# Patient Record
Sex: FEMALE | Race: WHITE | Employment: OTHER | URBAN - METROPOLITAN AREA
[De-identification: names, ages, dates, MRNs, and addresses within clinical notes are randomized per-mention and may not be internally consistent; named-entity substitution may affect disease eponyms.]

---

## 2017-02-08 PROBLEM — M79.671 PAIN IN BOTH FEET: Status: ACTIVE | Noted: 2017-02-08

## 2017-02-08 PROBLEM — Z23 ENCOUNTER FOR IMMUNIZATION: Status: ACTIVE | Noted: 2017-02-08

## 2017-02-08 PROBLEM — M79.672 PAIN IN BOTH FEET: Status: ACTIVE | Noted: 2017-02-08

## 2017-11-08 ENCOUNTER — HOSPITAL ENCOUNTER (OUTPATIENT)
Dept: MAMMOGRAPHY | Age: 77
Discharge: HOME | End: 2017-11-08
Attending: INTERNAL MEDICINE
Payer: MEDICARE

## 2017-11-08 DIAGNOSIS — Z12.31 VISIT FOR SCREENING MAMMOGRAM: ICD-10-CM

## 2017-11-08 PROCEDURE — 77067 SCR MAMMO BI INCL CAD: CPT

## 2018-01-10 PROBLEM — R73.9 HYPERGLYCEMIA: Status: ACTIVE | Noted: 2018-01-10

## 2018-07-11 PROBLEM — R73.09 ABNORMAL GLUCOSE: Status: ACTIVE | Noted: 2018-01-10

## 2018-07-11 PROBLEM — Z23 ENCOUNTER FOR IMMUNIZATION: Status: RESOLVED | Noted: 2017-02-08 | Resolved: 2018-07-11

## 2018-08-01 PROBLEM — E11.9 TYPE 2 DIABETES MELLITUS WITHOUT COMPLICATION, WITHOUT LONG-TERM CURRENT USE OF INSULIN (HCC): Status: ACTIVE | Noted: 2018-08-01

## 2018-08-01 PROBLEM — H61.21 IMPACTED CERUMEN OF RIGHT EAR: Status: ACTIVE | Noted: 2018-08-01

## 2018-09-12 ENCOUNTER — HOSPITAL ENCOUNTER (OUTPATIENT)
Dept: MRI IMAGING | Age: 78
Discharge: HOME | End: 2018-09-12
Attending: OTOLARYNGOLOGY
Payer: MEDICARE

## 2018-09-12 LAB — CREAT BLD-MCNC: 1 MG/DL (ref 0.8–1.5)

## 2018-09-12 PROCEDURE — A9575 INJ GADOTERATE MEGLUMI 0.1ML: HCPCS | Performed by: OTOLARYNGOLOGY

## 2018-09-12 PROCEDURE — 82565 ASSAY OF CREATININE: CPT

## 2018-09-12 PROCEDURE — 74011250636 HC RX REV CODE- 250/636: Performed by: OTOLARYNGOLOGY

## 2018-09-12 PROCEDURE — 70553 MRI BRAIN STEM W/O & W/DYE: CPT

## 2018-09-12 RX ORDER — SODIUM CHLORIDE 0.9 % (FLUSH) 0.9 %
10 SYRINGE (ML) INJECTION
Status: COMPLETED | OUTPATIENT
Start: 2018-09-12 | End: 2018-09-12

## 2018-09-12 RX ORDER — GADOTERATE MEGLUMINE 376.9 MG/ML
15 INJECTION INTRAVENOUS
Status: COMPLETED | OUTPATIENT
Start: 2018-09-12 | End: 2018-09-12

## 2018-09-12 RX ADMIN — GADOTERATE MEGLUMINE 15 ML: 376.9 INJECTION INTRAVENOUS at 14:30

## 2018-09-12 RX ADMIN — Medication 10 ML: at 14:31

## 2018-11-12 ENCOUNTER — HOSPITAL ENCOUNTER (OUTPATIENT)
Dept: MAMMOGRAPHY | Age: 78
Discharge: HOME | End: 2018-11-12
Payer: MEDICARE

## 2018-11-12 DIAGNOSIS — M89.9 DISORDER OF BONE AND CARTILAGE: ICD-10-CM

## 2018-11-12 DIAGNOSIS — Z12.31 ENCOUNTER FOR SCREENING MAMMOGRAM FOR MALIGNANT NEOPLASM OF BREAST: ICD-10-CM

## 2018-11-12 DIAGNOSIS — M94.9 DISORDER OF BONE AND CARTILAGE: ICD-10-CM

## 2018-11-12 PROCEDURE — 77067 SCR MAMMO BI INCL CAD: CPT

## 2018-11-12 PROCEDURE — 77080 DXA BONE DENSITY AXIAL: CPT

## 2018-11-16 NOTE — PROGRESS NOTES
I can let her know that bone density was improved slightly in the spine but not elsewhere.  Continue her current therapy and can discuss with Dr Bc Jaquez at Follow up

## 2019-01-29 PROBLEM — E11.21 TYPE 2 DIABETES WITH NEPHROPATHY (HCC): Status: ACTIVE | Noted: 2019-01-29

## 2020-08-19 PROBLEM — R73.9 HYPERGLYCEMIA: Status: ACTIVE | Noted: 2020-08-19

## 2021-02-17 PROBLEM — E11.21 TYPE 2 DIABETES WITH NEPHROPATHY (HCC): Status: RESOLVED | Noted: 2019-01-29 | Resolved: 2021-02-17

## 2021-02-17 PROBLEM — M25.552 LEFT HIP PAIN: Status: ACTIVE | Noted: 2021-02-17

## 2021-03-20 ENCOUNTER — HOSPITAL ENCOUNTER (OUTPATIENT)
Dept: MAMMOGRAPHY | Age: 81
Discharge: HOME | End: 2021-03-20
Attending: INTERNAL MEDICINE
Payer: MEDICARE

## 2021-03-20 DIAGNOSIS — M81.0 OSTEOPOROSIS, UNSPECIFIED OSTEOPOROSIS TYPE, UNSPECIFIED PATHOLOGICAL FRACTURE PRESENCE: ICD-10-CM

## 2021-03-20 PROCEDURE — 77080 DXA BONE DENSITY AXIAL: CPT

## 2021-03-21 NOTE — PROGRESS NOTES
Call patient and let her know that test is , try Prolia for persistent osteoporosis?   Works differently than drugs she took before, q 6 mon injection

## 2021-05-19 ENCOUNTER — HOSPITAL ENCOUNTER (OUTPATIENT)
Dept: INFUSION THERAPY | Age: 81
Discharge: HOME | End: 2021-05-19
Payer: MEDICARE

## 2021-05-19 ENCOUNTER — HOSPITAL ENCOUNTER (OUTPATIENT)
Dept: LAB | Age: 81
Discharge: HOME | End: 2021-05-19

## 2021-05-19 VITALS
OXYGEN SATURATION: 94 % | RESPIRATION RATE: 16 BRPM | TEMPERATURE: 97.7 F | HEART RATE: 98 BPM | DIASTOLIC BLOOD PRESSURE: 99 MMHG | SYSTOLIC BLOOD PRESSURE: 126 MMHG

## 2021-05-19 LAB — CALCIUM SERPL-MCNC: 9.9 MG/DL (ref 8.3–10.4)

## 2021-05-19 PROCEDURE — 82310 ASSAY OF CALCIUM: CPT

## 2021-05-19 PROCEDURE — 74011250636 HC RX REV CODE- 250/636: Performed by: INTERNAL MEDICINE

## 2021-05-19 PROCEDURE — 96372 THER/PROPH/DIAG INJ SC/IM: CPT

## 2021-05-19 PROCEDURE — 36415 COLL VENOUS BLD VENIPUNCTURE: CPT

## 2021-05-19 RX ADMIN — DENOSUMAB 60 MG: 60 INJECTION SUBCUTANEOUS at 10:57

## 2021-05-19 NOTE — PROGRESS NOTES
Arrived to the [facility]. Prolia completed. Provided education on side effects to report; informations sheet provided. Patient instructed to report any side affects to ordering provider. Patient tolerated well. Any issues or concerns during appointment: none. No future appt at this time. Discharged ambulatory.

## 2021-08-18 PROBLEM — E11.51 TYPE 2 DIABETES MELLITUS WITH PERIPHERAL VASCULAR DISEASE (HCC): Status: ACTIVE | Noted: 2018-08-01

## 2021-08-25 PROBLEM — F11.99 OPIOID USE, UNSPECIFIED WITH UNSPECIFIED OPIOID-INDUCED DISORDER (HCC): Status: ACTIVE | Noted: 2021-08-25

## 2021-09-04 ENCOUNTER — HOSPITAL ENCOUNTER (OUTPATIENT)
Dept: MAMMOGRAPHY | Age: 81
Discharge: HOME | End: 2021-09-04
Attending: NURSE PRACTITIONER
Payer: MEDICARE

## 2021-09-04 DIAGNOSIS — Z12.31 ENCOUNTER FOR SCREENING MAMMOGRAM FOR MALIGNANT NEOPLASM OF BREAST: ICD-10-CM

## 2021-09-04 PROCEDURE — 77067 SCR MAMMO BI INCL CAD: CPT

## 2022-03-14 PROBLEM — Z78.9 STATIN INTOLERANCE: Status: ACTIVE | Noted: 2022-03-14

## 2022-03-14 PROBLEM — R42 DIZZINESS: Status: ACTIVE | Noted: 2022-03-14

## 2022-03-18 PROBLEM — Z78.9 STATIN INTOLERANCE: Status: ACTIVE | Noted: 2022-03-14

## 2022-03-18 PROBLEM — M79.672 PAIN IN BOTH FEET: Status: ACTIVE | Noted: 2017-02-08

## 2022-03-18 PROBLEM — M79.671 PAIN IN BOTH FEET: Status: ACTIVE | Noted: 2017-02-08

## 2022-03-19 PROBLEM — R42 DIZZINESS: Status: ACTIVE | Noted: 2022-03-14

## 2022-03-19 PROBLEM — F11.99 OPIOID USE, UNSPECIFIED WITH UNSPECIFIED OPIOID-INDUCED DISORDER (HCC): Status: ACTIVE | Noted: 2021-08-25

## 2022-03-19 PROBLEM — E11.51 TYPE 2 DIABETES MELLITUS WITH PERIPHERAL VASCULAR DISEASE (HCC): Status: ACTIVE | Noted: 2018-08-01

## 2022-03-20 PROBLEM — M25.552 LEFT HIP PAIN: Status: ACTIVE | Noted: 2021-02-17

## 2022-04-22 DIAGNOSIS — E03.9 ACQUIRED HYPOTHYROIDISM: ICD-10-CM

## 2022-04-22 DIAGNOSIS — E11.51 TYPE 2 DIABETES MELLITUS WITH PERIPHERAL VASCULAR DISEASE (HCC): ICD-10-CM

## 2022-04-22 DIAGNOSIS — I10 ESSENTIAL HYPERTENSION: Primary | ICD-10-CM

## 2022-04-22 DIAGNOSIS — E78.5 HYPERLIPIDEMIA, UNSPECIFIED HYPERLIPIDEMIA TYPE: ICD-10-CM

## 2022-06-01 DIAGNOSIS — E11.51 TYPE 2 DIABETES MELLITUS WITH PERIPHERAL VASCULAR DISEASE (HCC): ICD-10-CM

## 2022-06-01 DIAGNOSIS — E03.9 ACQUIRED HYPOTHYROIDISM: ICD-10-CM

## 2022-06-01 DIAGNOSIS — I10 ESSENTIAL HYPERTENSION: ICD-10-CM

## 2022-06-01 DIAGNOSIS — E78.5 HYPERLIPIDEMIA, UNSPECIFIED HYPERLIPIDEMIA TYPE: ICD-10-CM

## 2022-06-01 LAB
ALT SERPL-CCNC: 30 U/L (ref 12–65)
ANION GAP SERPL CALC-SCNC: 6 MMOL/L (ref 7–16)
BUN SERPL-MCNC: 21 MG/DL (ref 8–23)
CALCIUM SERPL-MCNC: 9.6 MG/DL (ref 8.3–10.4)
CHLORIDE SERPL-SCNC: 104 MMOL/L (ref 98–107)
CHOLEST SERPL-MCNC: 143 MG/DL
CO2 SERPL-SCNC: 31 MMOL/L (ref 21–32)
CREAT SERPL-MCNC: 1 MG/DL (ref 0.6–1)
EST. AVERAGE GLUCOSE BLD GHB EST-MCNC: 126 MG/DL
GLUCOSE SERPL-MCNC: 96 MG/DL (ref 65–100)
HBA1C MFR BLD: 6 % (ref 4.2–6.3)
HDLC SERPL-MCNC: 50 MG/DL (ref 40–60)
HDLC SERPL: 2.9 {RATIO}
LDLC SERPL CALC-MCNC: 69.6 MG/DL
POTASSIUM SERPL-SCNC: 4.7 MMOL/L (ref 3.5–5.1)
SODIUM SERPL-SCNC: 141 MMOL/L (ref 136–145)
TRIGL SERPL-MCNC: 117 MG/DL (ref 35–150)
TSH, 3RD GENERATION: 0.55 UIU/ML (ref 0.36–3.74)
VLDLC SERPL CALC-MCNC: 23.4 MG/DL (ref 6–23)

## 2022-06-22 ENCOUNTER — OFFICE VISIT (OUTPATIENT)
Dept: VASCULAR SURGERY | Age: 82
End: 2022-06-22
Payer: MEDICARE

## 2022-06-22 VITALS
WEIGHT: 154.3 LBS | HEART RATE: 88 BPM | OXYGEN SATURATION: 92 % | BODY MASS INDEX: 29.13 KG/M2 | DIASTOLIC BLOOD PRESSURE: 82 MMHG | SYSTOLIC BLOOD PRESSURE: 152 MMHG | HEIGHT: 61 IN | TEMPERATURE: 97.2 F

## 2022-06-22 DIAGNOSIS — Z98.890 H/O CAROTID ENDARTERECTOMY: ICD-10-CM

## 2022-06-22 DIAGNOSIS — I65.22 LEFT CAROTID STENOSIS: Primary | ICD-10-CM

## 2022-06-22 PROCEDURE — G8417 CALC BMI ABV UP PARAM F/U: HCPCS | Performed by: NURSE PRACTITIONER

## 2022-06-22 PROCEDURE — 99213 OFFICE O/P EST LOW 20 MIN: CPT | Performed by: NURSE PRACTITIONER

## 2022-06-22 PROCEDURE — 1090F PRES/ABSN URINE INCON ASSESS: CPT | Performed by: NURSE PRACTITIONER

## 2022-06-22 PROCEDURE — G8399 PT W/DXA RESULTS DOCUMENT: HCPCS | Performed by: NURSE PRACTITIONER

## 2022-06-22 PROCEDURE — 1123F ACP DISCUSS/DSCN MKR DOCD: CPT | Performed by: NURSE PRACTITIONER

## 2022-06-22 PROCEDURE — 1036F TOBACCO NON-USER: CPT | Performed by: NURSE PRACTITIONER

## 2022-06-22 PROCEDURE — G8427 DOCREV CUR MEDS BY ELIG CLIN: HCPCS | Performed by: NURSE PRACTITIONER

## 2022-06-22 NOTE — PATIENT INSTRUCTIONS
Patient Education        Carotid Stenosis: Care Instructions  Overview     Carotid stenosis is narrowing of one or both of the carotid arteries. These arteries take blood from the heart to the brain. There is one on each side ofthe neck. Carotid artery stenosis is caused by a process called hardening of the arteries, or atherosclerosis. A substance called plaque builds up inside the carotid arteries. Things that can lead to plaque include diabetes, high blood pressure, high cholesterol, and smoking. This plaque may limit blood flow to your brain. If plaque breaks open, it may form a blood clot. Or pieces of the plaque may break off. A piece of plaque or a blood clot could move to thebrain, causing a stroke or transient ischemic attack (TIA). The goal of treatment is to lower your risk of having a stroke or TIA. You can lower your risk by having a heart-healthy lifestyle and taking medicine. Sometimes a surgery or procedure is also done. Follow-up care is a key part of your treatment and safety. Be sure to make and go to all appointments, and call your doctor if you are having problems. It's also a good idea to know your test results and keep alist of the medicines you take. How can you care for yourself at home?  Take your medicines exactly as prescribed. Call your doctor if you think you are having a problem with your medicine. You may take medicine to lower your blood pressure, to lower your cholesterol, or to prevent blood clots.  If you take a blood thinner, such as aspirin, be sure to get instructions about how to take your medicine safely. Blood thinners can cause serious bleeding problems.  Do not smoke. People who smoke have a higher chance of stroke than those who quit. If you need help quitting, talk to your doctor about stop-smoking programs and medicines. These can increase your chances of quitting for good.  Eat heart-healthy foods.  These foods include vegetables, fruits, nuts, beans, lean meat, fish, and whole grains. You limit things that are not so good for your heart, like sodium, alcohol, and sugar.  Stay at a healthy weight. Lose weight if you need to.  Be active. Ask your doctor what type and level of exercise is safe for you.  Limit alcohol to 2 drinks a day for men and 1 drink a day for women. Too much alcohol can cause health problems.  Manage other health problems such as diabetes, high blood pressure, and high cholesterol. If you think you may have a problem with alcohol or drug use, talk to your doctor.  Avoid colds and flu. Get the flu vaccine every year. When should you call for help? Call 911 anytime you think you may need emergency care. For example, call if:     You passed out (lost consciousness).      You have symptoms of a stroke. These may include:  ? Sudden numbness, tingling, weakness, or loss of movement in your face, arm, or leg, especially on only one side of your body. ? Sudden vision changes. ? Sudden trouble speaking. ? Sudden confusion or trouble understanding simple statements. ? Sudden problems with walking or balance. ? A sudden, severe headache that is different from past headaches. Call your doctor now or seek immediate medical care if:     You are dizzy or lightheaded, or you feel like you may faint. Watch closely for changes in your health, and be sure to contact your doctor ifyou have any problems. Where can you learn more? Go to [email or web address] and sign in to your [facility] account. Enter S546 in the [facility] box to learn more about \"Carotid Stenosis: Care Instructions. \"     If you do not have an account, please click on the \"Sign Up Now\" link. Current as of: January 10, 2022               Content Version: 13.3  © 7795-3035 [employer]wise, [employer]. Care instructions adapted under license by [facility] ([facility]).  If you have questions about a medical condition or this instruction, always ask your healthcare professional. [address] any warranty or liability for your use of this information.

## 2022-06-22 NOTE — PROGRESS NOTES
DATE OF VISIT: 6/22/2022      [facility]  Orin Nolasco is a 80 y.o. female who follows up for her 6-month carotid duplex study. She denies any new lateralizing neurological symptoms. She remains on aspirin and atorvastatin. MEDICAL HISTORY:   Past Medical History:   Diagnosis Date    Carotid artery stenosis 10/1/2014    Depression 1/4/2016    Hyperlipidemia     Insomnia 1/4/2016    Irritable bowel syndrome 1/4/2016    Osteoarthritis 1/4/2016    Osteoporosis 1/4/2016    Peripheral artery insufficiency ([address] 1/4/2016    Sciatica 1/4/2016    TIA (transient ischemic attack) 9/11/2016         SURGICAL HISTORY:   Past Surgical History:   Procedure Laterality Date    APPENDECTOMY      BREAST SURGERY Left     cyst removed    CHOLECYSTECTOMY      COLONOSCOPY      CYST INCISION AND DRAINAGE Left 1980's    HIP FRACTURE SURGERY Right 3/2014    HYSTERECTOMY (CERVIX STATUS UNKNOWN)      ORTHOPEDIC SURGERY Right 2014    partial hip replacement    TONSILLECTOMY         Review of Systems:  Constitutional:   Negative for fevers and unexplained weight loss. Respiratory:   Negative for hemoptysis. Cardiovascular:   Negative except as noted in HPI. Musculoskeletal:  Negative for active, unexplained/severe joint pain.       ALLERGIES:   Allergies   Allergen Reactions    Lactose Other (See Comments)    Statins Myalgia     Only allergic to pravastatin       CURRENT MEDICATIONS:  Current Outpatient Medications   Medication Sig Dispense Refill    Multiple Vitamin (MULTI-VITAMINS PO) Take 1 tablet by mouth      aspirin 81 MG EC tablet Take 81 mg by mouth 2 times daily      atorvastatin (LIPITOR) 10 MG tablet Take 10 mg by mouth daily      Bempedoic Acid (NEXLETOL) 180 MG TABS Take 1 tablet by mouth daily      Calcium Carbonate-Vitamin D (CALCIUM-VITAMIN D) 600-125 MG-UNIT TABS Take 1 tablet by mouth Daily with lunch      coenzyme Q10 100 MG CAPS capsule Take 100 mg by mouth Daily with lunch      cyclobenzaprine (FLEXERIL) 10 MG tablet TK 1 T PO QHS FOR MUSCLE SPASM      denosumab (PROLIA) 60 MG/ML SOSY SC injection Inject 60 mg into the skin      levothyroxine (SYNTHROID) 88 MCG tablet Take 1/2 tab on Sunday and one all other days Indications: a condition with low thyroid hormone levels      lisinopril (PRINIVIL;ZESTRIL) 20 MG tablet Take 20 mg by mouth 2 times daily      meclizine (ANTIVERT) 25 MG tablet Take 25 mg by mouth 3 times daily as needed      polyethylene glycol (GLYCOLAX) 17 GM/SCOOP powder Take 17 g by mouth      sertraline (ZOLOFT) 50 MG tablet Take 50 mg by mouth daily       No current facility-administered medications for this visit. IMAGING: Interpretation Summary         Right Carotid: Peak systolic velocities and atherosclerotic plaque indicate a <50% stenosis in the ICA.   Left Carotid: Peak systolic velocities and atherosclerotic plaque indicate a 70-99% stenosis in the ICA.   The vertebral arteries are patent with antegrade flow bilaterally.   The proximal right CCA was used for the ICA/CCA ratio due to the mid CCA stenosis.       Procedure Details    A gray scale, color, Doppler analysis and B-mode ultrasound was performed. During the study longitudinal and transverse views were obtained. Pulsed wave doppler was performed. Overall the study quality was good. Cerebrovascular Findings      Right Carotid    There is moderate stenosis in the right CCA. The right CCA has moderate and calcific plaque. The right distal CCA has turbulent flow. There is mild stenosis in the right ICA (<50%). The right vertebral is antegrade. Left Carotid    The left CCA has mild and calcific plaque. There is severe stenosis in the left ICA (>70%) and at the proximal segment . The left ICA has heterogeneous plaque. The left vertebral is antegrade.        Carotid Measurements     Right PSV Right EDV Left PSV Left EDV   CCA Prox 96.5 cm/s       15.6 cm/s       102 cm/s       30 cm/s CCA Mid 153 cm/s       36 cm/s                 CCA Dist 162 cm/s       37.7 cm/s       96.9 cm/s       22.3 cm/s         ICA Prox 126 cm/s       23.2 cm/s       438 cm/s       152 cm/s         ICA Mid 132 cm/s       43.7 cm/s       153 cm/s       30.7 cm/s         ICA Dist 140 cm/s       41.2 cm/s       141 cm/s       41.3 cm/s         ICA/CCA Ratio 1.45         4.29            cm/s       11.6 cm/s       127 cm/s       11.1 cm/s         Vertebral 122 cm/s       30.9 cm/s       105 cm/s       29.9 cm/s                                     Procedure Staff    Technologist/Clinician: Alexey Nj  Supporting Staff: None  Performing Physician/Midlevel: None     Exam Completion Date/Time: 6/22/22  8:56 AM            PHYSICAL EXAM  VITALS: BP (!) 152/82 (Site: Right Upper Arm, Position: Sitting, Cuff Size: Small Adult)   Pulse 88   Temp 97.2 °F (36.2 °C) (Temporal)   Ht 5' 1\" (1.549 m)   Wt 154 lb 4.8 oz (70 kg)   SpO2 92%   BMI 29.15 kg/m²       GENERAL: Well developed, well nourished, in NAD  HEAD/NECK: normocephalic, atraumatic, neck supple  MUSCULOSKELETAL: normal gait  NEURO: sensation and strength grossly intact and symmetrical  PSYCH: alert and oriented to person, place and time      Assessment/Plan: Patient is an 79-year-old female who follows up for 6-month carotid duplex study.  No new lateralizing neurological symptoms.  She is to continue her aspirin and cholesterol-lowering medication. Stan Morning is to present to the emergency  department with any S/S of stroke i.e. slurred speech, facial drooping, amaurosis fugax or unilateral weakness.  Her left carotid lesion is high at C1.  Should she become symptomatic she would potentially require a carotid stent.  Follow-up in 4 months  sooner should any issues arise. Ultrasound duplex reviewed with Dr. Kate Frank who is in agreement with the treatment plan.      Alivia Thomas, APRN - CNP    20 minutes of time was spent on this encounter including chart review, assessment and evaluation

## 2022-07-05 ENCOUNTER — PROCEDURE VISIT (OUTPATIENT)
Dept: ENT CLINIC | Age: 82
End: 2022-07-05

## 2022-07-05 DIAGNOSIS — Z97.4 USES HEARING AID: Primary | ICD-10-CM

## 2022-07-05 PROCEDURE — V5266 BATTERY FOR HEARING DEVICE: HCPCS | Performed by: AUDIOLOGIST

## 2022-07-11 ENCOUNTER — OFFICE VISIT (OUTPATIENT)
Dept: INTERNAL MEDICINE CLINIC | Facility: CLINIC | Age: 82
End: 2022-07-11
Payer: MEDICARE

## 2022-07-11 VITALS
HEIGHT: 61 IN | SYSTOLIC BLOOD PRESSURE: 136 MMHG | WEIGHT: 154 LBS | BODY MASS INDEX: 29.07 KG/M2 | DIASTOLIC BLOOD PRESSURE: 84 MMHG

## 2022-07-11 DIAGNOSIS — I65.22 LEFT CAROTID STENOSIS: ICD-10-CM

## 2022-07-11 DIAGNOSIS — I10 ESSENTIAL HYPERTENSION: ICD-10-CM

## 2022-07-11 DIAGNOSIS — E78.5 HYPERLIPIDEMIA, UNSPECIFIED HYPERLIPIDEMIA TYPE: ICD-10-CM

## 2022-07-11 DIAGNOSIS — E11.51 TYPE 2 DIABETES MELLITUS WITH PERIPHERAL VASCULAR DISEASE (HCC): ICD-10-CM

## 2022-07-11 DIAGNOSIS — F11.99 OPIOID USE, UNSPECIFIED WITH UNSPECIFIED OPIOID-INDUCED DISORDER (HCC): ICD-10-CM

## 2022-07-11 DIAGNOSIS — F32.A DEPRESSION, UNSPECIFIED DEPRESSION TYPE: ICD-10-CM

## 2022-07-11 DIAGNOSIS — E03.9 ACQUIRED HYPOTHYROIDISM: Primary | ICD-10-CM

## 2022-07-11 DIAGNOSIS — M19.91 PRIMARY OSTEOARTHRITIS, UNSPECIFIED SITE: ICD-10-CM

## 2022-07-11 PROCEDURE — 1036F TOBACCO NON-USER: CPT | Performed by: INTERNAL MEDICINE

## 2022-07-11 PROCEDURE — G8427 DOCREV CUR MEDS BY ELIG CLIN: HCPCS | Performed by: INTERNAL MEDICINE

## 2022-07-11 PROCEDURE — G8417 CALC BMI ABV UP PARAM F/U: HCPCS | Performed by: INTERNAL MEDICINE

## 2022-07-11 PROCEDURE — G8399 PT W/DXA RESULTS DOCUMENT: HCPCS | Performed by: INTERNAL MEDICINE

## 2022-07-11 PROCEDURE — 1090F PRES/ABSN URINE INCON ASSESS: CPT | Performed by: INTERNAL MEDICINE

## 2022-07-11 PROCEDURE — 99214 OFFICE O/P EST MOD 30 MIN: CPT | Performed by: INTERNAL MEDICINE

## 2022-07-11 PROCEDURE — 1123F ACP DISCUSS/DSCN MKR DOCD: CPT | Performed by: INTERNAL MEDICINE

## 2022-07-11 PROCEDURE — 3044F HG A1C LEVEL LT 7.0%: CPT | Performed by: INTERNAL MEDICINE

## 2022-07-11 RX ORDER — LEVOTHYROXINE SODIUM 88 UG/1
TABLET ORAL
Qty: 90 TABLET | Refills: 3 | Status: SHIPPED | OUTPATIENT
Start: 2022-07-11

## 2022-07-11 RX ORDER — TRAMADOL HYDROCHLORIDE 50 MG/1
50 TABLET ORAL 2 TIMES DAILY PRN
Qty: 60 TABLET | Refills: 5 | Status: SHIPPED | OUTPATIENT
Start: 2022-07-11 | End: 2022-08-10

## 2022-07-11 RX ORDER — TRAMADOL HYDROCHLORIDE 50 MG/1
50 TABLET ORAL 2 TIMES DAILY PRN
COMMUNITY
End: 2022-07-11 | Stop reason: SDUPTHER

## 2022-07-11 RX ORDER — ATORVASTATIN CALCIUM 10 MG/1
10 TABLET, FILM COATED ORAL DAILY
Qty: 90 TABLET | Refills: 3 | Status: SHIPPED | OUTPATIENT
Start: 2022-07-11

## 2022-07-11 RX ORDER — LISINOPRIL 20 MG/1
20 TABLET ORAL 2 TIMES DAILY
Qty: 180 TABLET | Refills: 3 | Status: SHIPPED | OUTPATIENT
Start: 2022-07-11

## 2022-07-11 ASSESSMENT — PATIENT HEALTH QUESTIONNAIRE - PHQ9
8. MOVING OR SPEAKING SO SLOWLY THAT OTHER PEOPLE COULD HAVE NOTICED. OR THE OPPOSITE, BEING SO FIGETY OR RESTLESS THAT YOU HAVE BEEN MOVING AROUND A LOT MORE THAN USUAL: 0
2. FEELING DOWN, DEPRESSED OR HOPELESS: 0
1. LITTLE INTEREST OR PLEASURE IN DOING THINGS: 0
3. TROUBLE FALLING OR STAYING ASLEEP: 0
SUM OF ALL RESPONSES TO PHQ QUESTIONS 1-9: 0
10. IF YOU CHECKED OFF ANY PROBLEMS, HOW DIFFICULT HAVE THESE PROBLEMS MADE IT FOR YOU TO DO YOUR WORK, TAKE CARE OF THINGS AT HOME, OR GET ALONG WITH OTHER PEOPLE: 0
7. TROUBLE CONCENTRATING ON THINGS, SUCH AS READING THE NEWSPAPER OR WATCHING TELEVISION: 0
9. THOUGHTS THAT YOU WOULD BE BETTER OFF DEAD, OR OF HURTING YOURSELF: 0
SUM OF ALL RESPONSES TO PHQ9 QUESTIONS 1 & 2: 0
SUM OF ALL RESPONSES TO PHQ QUESTIONS 1-9: 0
4. FEELING TIRED OR HAVING LITTLE ENERGY: 0
6. FEELING BAD ABOUT YOURSELF - OR THAT YOU ARE A FAILURE OR HAVE LET YOURSELF OR YOUR FAMILY DOWN: 0
5. POOR APPETITE OR OVEREATING: 0
SUM OF ALL RESPONSES TO PHQ QUESTIONS 1-9: 0
SUM OF ALL RESPONSES TO PHQ QUESTIONS 1-9: 0

## 2022-07-11 ASSESSMENT — ENCOUNTER SYMPTOMS: SHORTNESS OF BREATH: 0

## 2022-07-11 NOTE — PROGRESS NOTES
She is seeing vascular surgery every 4 to 6 months, Dr Sudha Crowe. Doing OK on the atorvastatin. Carotid blockage is fairly high in the neck,  She has vertigo every few months and just cannot walk. She lays down and waits for it to resolve. Past Medical History, Past Surgical History, Family history, Social History, and Medications were all reviewed with the patient today and updated as necessary. Current Outpatient Medications   Medication Sig Dispense Refill    traMADol (ULTRAM) 50 MG tablet Take 50 mg by mouth 2 times daily as needed for Pain.  Multiple Vitamin (MULTI-VITAMINS PO) Take 1 tablet by mouth      aspirin 81 MG EC tablet Take 81 mg by mouth 2 times daily      atorvastatin (LIPITOR) 10 MG tablet Take 10 mg by mouth daily      Calcium Carbonate-Vitamin D (CALCIUM-VITAMIN D) 600-125 MG-UNIT TABS Take 1 tablet by mouth Daily with lunch      coenzyme Q10 100 MG CAPS capsule Take 100 mg by mouth Daily with lunch      denosumab (PROLIA) 60 MG/ML SOSY SC injection Inject 60 mg into the skin      levothyroxine (SYNTHROID) 88 MCG tablet Take 1/2 tab on Sunday and one all other days Indications: a condition with low thyroid hormone levels      lisinopril (PRINIVIL;ZESTRIL) 20 MG tablet Take 20 mg by mouth 2 times daily      meclizine (ANTIVERT) 25 MG tablet Take 25 mg by mouth 3 times daily as needed      polyethylene glycol (GLYCOLAX) 17 GM/SCOOP powder Take 17 g by mouth      sertraline (ZOLOFT) 50 MG tablet Take 50 mg by mouth daily      cyclobenzaprine (FLEXERIL) 10 MG tablet TK 1 T PO QHS FOR MUSCLE SPASM (Patient not taking: Reported on 7/11/2022)       No current facility-administered medications for this visit.      Allergies   Allergen Reactions    Lactose Other (See Comments)     Abdominal pain, irritable bowel    Nexletol [Bempedoic Acid] Diarrhea    Statins Myalgia     Only allergic to pravastatin     Patient Active Problem List   Diagnosis    Osteoarthritis    Irritable bowel syndrome    Statin intolerance    Pain in both feet    Opioid use, unspecified with unspecified opioid-induced disorder (HCC)    Dizziness    TIA (transient ischemic attack)    Essential hypertension    Depression    Type 2 diabetes mellitus with peripheral vascular disease ([address]    Left carotid stenosis    Osteoporosis    Insomnia    Hypothyroidism    Sciatica    Left hip pain    Hyperlipidemia    H/O carotid endarterectomy         Review of Systems   Respiratory: Negative for shortness of breath. Musculoskeletal: Negative for myalgias. Neurological: Positive for dizziness. OBJECTIVE:  /84   Ht 5' 1\" (1.549 m)   Wt 154 lb (69.9 kg)   BMI 29.10 kg/m²      Physical Exam  Constitutional:       Appearance: Normal appearance. HENT:      Head: Normocephalic. Neurological:      Mental Status: She is alert. Psychiatric:         Mood and Affect: Mood normal.         Behavior: Behavior normal.          Medical problems and test results were reviewed with the patient today.      Recent Results (from the past 672 hour(s))   Vascular duplex carotid bilateral    Collection Time: 06/22/22  8:50 AM   Result Value Ref Range    Left CCA dist PSV 96.9 cm/s    Left CCA dist EDV 22.3 cm/s    Left CCA prox .0 cm/s    Left CCA prox EDV 30.0 cm/s    Left ICA dist .0 cm/s    Left ICA dist EDV 41.3 cm/s    Left ICA mid .0 cm/s    Left ICA mid EDV 30.7 cm/s    Left ICA prox .0 cm/s    Left ICA prox .0 cm/s    Left ECA .0 cm/s    Left ECA EDV 11.10 cm/s    Left vertebral .0 cm/s    Left vertebral EDV 29.90 cm/s    Right cca dist .0 cm/s    Right CCA dist EDV 37.7 cm/s    Right CCA mid .00 cm/s    Right CCA mid EDV 36.00 cm/s    Right CCA prox PSV 96.5 cm/s    Right CCA prox EDV 15.6 cm/s    Right ICA dist .0 cm/s    Right ICA dist EDV 41.2 cm/s    Right ICA mid .0 cm/s    Right ICA mid EDV 43.7 cm/s    Right ICA prox .0 cm/s    Right ICA prox EDV 23.2 cm/s    Right ECA .0 cm/s    Right ECA EDV 11.60 cm/s    Right vertebral .0 cm/s    Right vertebral EDV 30.90 cm/s    Right ICA/CCA PSV 1.45     Left ICA/CCA PSV 4.29          ASSESSMENT and PLAN    1. Acquired hypothyroidism  The following orders have not been finalized:  -     levothyroxine (SYNTHROID) 88 MCG tablet  2. Opioid use, unspecified with unspecified opioid-induced disorder ([facility]  3. Type 2 diabetes mellitus with peripheral vascular disease ([facility]  4. Hyperlipidemia, unspecified hyperlipidemia type  The following orders have not been finalized:  -     atorvastatin (LIPITOR) 10 MG tablet  5. Essential hypertension  The following orders have not been finalized:  -     lisinopril (PRINIVIL;ZESTRIL) 20 MG tablet  6. Left carotid stenosis  7. Primary osteoarthritis, unspecified site  The following orders have not been finalized:  -     traMADol (ULTRAM) 50 MG tablet  8.  Depression, unspecified depression type  The following orders have not been finalized:  -     sertraline (ZOLOFT) 50 MG tablet       doing well on statin Rx   Vascular following high carotid lesion, asymptomatic  Takes tramodol prn for severe hip pain after partial hip replacement

## 2022-10-25 ENCOUNTER — OFFICE VISIT (OUTPATIENT)
Dept: VASCULAR SURGERY | Age: 82
End: 2022-10-25
Payer: MEDICARE

## 2022-10-25 VITALS
SYSTOLIC BLOOD PRESSURE: 153 MMHG | HEIGHT: 61 IN | HEART RATE: 101 BPM | DIASTOLIC BLOOD PRESSURE: 83 MMHG | BODY MASS INDEX: 28.7 KG/M2 | WEIGHT: 152 LBS | OXYGEN SATURATION: 93 % | TEMPERATURE: 97 F

## 2022-10-25 DIAGNOSIS — I65.23 BILATERAL CAROTID ARTERY STENOSIS: Primary | ICD-10-CM

## 2022-10-25 PROCEDURE — G8417 CALC BMI ABV UP PARAM F/U: HCPCS | Performed by: SURGERY

## 2022-10-25 PROCEDURE — 1090F PRES/ABSN URINE INCON ASSESS: CPT | Performed by: SURGERY

## 2022-10-25 PROCEDURE — 99213 OFFICE O/P EST LOW 20 MIN: CPT | Performed by: SURGERY

## 2022-10-25 PROCEDURE — 1036F TOBACCO NON-USER: CPT | Performed by: SURGERY

## 2022-10-25 PROCEDURE — G8427 DOCREV CUR MEDS BY ELIG CLIN: HCPCS | Performed by: SURGERY

## 2022-10-25 PROCEDURE — G8484 FLU IMMUNIZE NO ADMIN: HCPCS | Performed by: SURGERY

## 2022-10-25 PROCEDURE — G8399 PT W/DXA RESULTS DOCUMENT: HCPCS | Performed by: SURGERY

## 2022-10-25 PROCEDURE — 1123F ACP DISCUSS/DSCN MKR DOCD: CPT | Performed by: SURGERY

## 2022-10-25 NOTE — PROGRESS NOTES
[address]   [address] [facility] FAX: [phone]iqui  : 1940    Chief Complaint:     History of Present Illness:   Patient follows up today for follows up for carotid surveillance. Patient has known left carotid stenosis greater than 70% however the lesion is very high at the base of C1. Patient denies any symptoms    CURRENT MEDICATIONS:  Current Outpatient Medications   Medication Sig Dispense Refill    sertraline (ZOLOFT) 50 MG tablet Take 1 tablet by mouth daily 90 tablet 3    lisinopril (PRINIVIL;ZESTRIL) 20 MG tablet Take 1 tablet by mouth 2 times daily 180 tablet 3    levothyroxine (SYNTHROID) 88 MCG tablet Take 1/2 tab on  and one all other days Indications: a condition with low thyroid hormone levels 90 tablet 3    atorvastatin (LIPITOR) 10 MG tablet Take 1 tablet by mouth daily 90 tablet 3    Multiple Vitamin (MULTI-VITAMINS PO) Take 1 tablet by mouth      aspirin 81 MG EC tablet Take 81 mg by mouth 2 times daily      Calcium Carbonate-Vitamin D (CALCIUM-VITAMIN D) 600-125 MG-UNIT TABS Take 1 tablet by mouth Daily with lunch      coenzyme Q10 100 MG CAPS capsule Take 100 mg by mouth Daily with lunch      denosumab (PROLIA) 60 MG/ML SOSY SC injection Inject 60 mg into the skin      meclizine (ANTIVERT) 25 MG tablet Take 25 mg by mouth 3 times daily as needed      polyethylene glycol (GLYCOLAX) 17 GM/SCOOP powder Take 17 g by mouth       No current facility-administered medications for this visit.        Past Medical History:   Diagnosis Date    Carotid artery stenosis 10/1/2014    Depression 2016    Hyperlipidemia     Insomnia 2016    Irritable bowel syndrome 2016    Osteoarthritis 2016    Osteoporosis 2016    Peripheral artery insufficiency ([address] 2016    Sciatica 2016    TIA (transient ischemic attack) 2016       Physical Examination:   Height: 5' 1\" (1.549 m), Weight: 152 lb (68.9 kg), BP: (!) 153/83    Constitutional: she appears well-developed. No distress. HENT:   Head: Atraumatic. Eyes: Pupils are equal, round, and reactive to light. Neck: Normal range of motion. Cardiovascular: Regular rhythm. Pulmonary/Chest: Effort normal and breath sounds normal. No respiratory distress. Abdominal: Soft. Bowel sounds are normal. she exhibits no distension. There is no tenderness. There is no guarding. No hernia. Musculoskeletal: Normal range of motion. Neurological: She is alert. CN II- XII grossly intact   Vascular: No carotid bruits    Imaging:  Right carotid velocities less than 50%  Left carotid velocities 342 cm/s end-diastolic 6/56/2806 indicative greater than 70%        Recommendations/Plans:   Ms. Berna Darling is a 80y.o. year old female with asymptomatic left carotid stenosis. Long discussion with patient in detail. Her lesion from last CT scan with high she continues to be asymptomatic and does not want any surgery. Discussed with patient if she does develop symptoms she may be of benefit for a transfer carotid stent. She will continue her dual antiplatelets and follow-up in 6 months. Theresa Alcazar MD    Elements of this note have been dictated using speech recognition software. As a result, errors of speech recognition may have occurred.     30 minutes of time was spent on this encounter including chart review, assessment, evaluation and coordination of patient care

## 2023-02-12 SDOH — ECONOMIC STABILITY: FOOD INSECURITY: WITHIN THE PAST 12 MONTHS, YOU WORRIED THAT YOUR FOOD WOULD RUN OUT BEFORE YOU GOT MONEY TO BUY MORE.: NEVER TRUE

## 2023-02-12 SDOH — ECONOMIC STABILITY: INCOME INSECURITY: HOW HARD IS IT FOR YOU TO PAY FOR THE VERY BASICS LIKE FOOD, HOUSING, MEDICAL CARE, AND HEATING?: NOT HARD AT ALL

## 2023-02-12 SDOH — ECONOMIC STABILITY: HOUSING INSECURITY
IN THE LAST 12 MONTHS, WAS THERE A TIME WHEN YOU DID NOT HAVE A STEADY PLACE TO SLEEP OR SLEPT IN A SHELTER (INCLUDING NOW)?: NO

## 2023-02-12 SDOH — ECONOMIC STABILITY: TRANSPORTATION INSECURITY
IN THE PAST 12 MONTHS, HAS LACK OF TRANSPORTATION KEPT YOU FROM MEETINGS, WORK, OR FROM GETTING THINGS NEEDED FOR DAILY LIVING?: NO

## 2023-02-12 SDOH — ECONOMIC STABILITY: FOOD INSECURITY: WITHIN THE PAST 12 MONTHS, THE FOOD YOU BOUGHT JUST DIDN'T LAST AND YOU DIDN'T HAVE MONEY TO GET MORE.: NEVER TRUE

## 2023-02-12 SDOH — HEALTH STABILITY: PHYSICAL HEALTH: ON AVERAGE, HOW MANY DAYS PER WEEK DO YOU ENGAGE IN MODERATE TO STRENUOUS EXERCISE (LIKE A BRISK WALK)?: 0 DAYS

## 2023-02-12 ASSESSMENT — PATIENT HEALTH QUESTIONNAIRE - PHQ9
2. FEELING DOWN, DEPRESSED OR HOPELESS: 0
SUM OF ALL RESPONSES TO PHQ9 QUESTIONS 1 & 2: 0
SUM OF ALL RESPONSES TO PHQ QUESTIONS 1-9: 0
1. LITTLE INTEREST OR PLEASURE IN DOING THINGS: 0

## 2023-02-12 ASSESSMENT — LIFESTYLE VARIABLES
HOW OFTEN DO YOU HAVE A DRINK CONTAINING ALCOHOL: MONTHLY OR LESS
HOW MANY STANDARD DRINKS CONTAINING ALCOHOL DO YOU HAVE ON A TYPICAL DAY: 1 OR 2
HOW OFTEN DO YOU HAVE SIX OR MORE DRINKS ON ONE OCCASION: 1
HOW OFTEN DO YOU HAVE A DRINK CONTAINING ALCOHOL: 2
HOW MANY STANDARD DRINKS CONTAINING ALCOHOL DO YOU HAVE ON A TYPICAL DAY: 1

## 2023-02-15 ENCOUNTER — OFFICE VISIT (OUTPATIENT)
Dept: INTERNAL MEDICINE CLINIC | Facility: CLINIC | Age: 83
End: 2023-02-15
Payer: MEDICARE

## 2023-02-15 VITALS
HEIGHT: 61 IN | WEIGHT: 152 LBS | SYSTOLIC BLOOD PRESSURE: 136 MMHG | BODY MASS INDEX: 28.7 KG/M2 | DIASTOLIC BLOOD PRESSURE: 84 MMHG

## 2023-02-15 DIAGNOSIS — I10 ESSENTIAL HYPERTENSION: ICD-10-CM

## 2023-02-15 DIAGNOSIS — M25.551 RIGHT HIP PAIN: ICD-10-CM

## 2023-02-15 DIAGNOSIS — Z00.00 MEDICARE ANNUAL WELLNESS VISIT, SUBSEQUENT: Primary | ICD-10-CM

## 2023-02-15 DIAGNOSIS — E11.51 TYPE 2 DIABETES MELLITUS WITH PERIPHERAL VASCULAR DISEASE (HCC): ICD-10-CM

## 2023-02-15 DIAGNOSIS — M81.0 AGE-RELATED OSTEOPOROSIS WITHOUT CURRENT PATHOLOGICAL FRACTURE: ICD-10-CM

## 2023-02-15 DIAGNOSIS — E03.9 HYPOTHYROIDISM, UNSPECIFIED TYPE: ICD-10-CM

## 2023-02-15 DIAGNOSIS — E78.5 HYPERLIPIDEMIA, UNSPECIFIED HYPERLIPIDEMIA TYPE: ICD-10-CM

## 2023-02-15 DIAGNOSIS — F11.99 OPIOID USE, UNSPECIFIED WITH UNSPECIFIED OPIOID-INDUCED DISORDER (HCC): ICD-10-CM

## 2023-02-15 LAB
ALBUMIN SERPL-MCNC: 3.9 G/DL (ref 3.2–4.6)
ALBUMIN/GLOB SERPL: 1 (ref 0.4–1.6)
ALP SERPL-CCNC: 102 U/L (ref 50–136)
ALT SERPL-CCNC: 23 U/L (ref 12–65)
ANION GAP SERPL CALC-SCNC: 10 MMOL/L (ref 2–11)
AST SERPL-CCNC: 23 U/L (ref 15–37)
BACTERIA URNS QL MICRO: NEGATIVE /HPF
BASOPHILS # BLD: 0.1 K/UL (ref 0–0.2)
BASOPHILS NFR BLD: 1 % (ref 0–2)
BILIRUB SERPL-MCNC: 0.6 MG/DL (ref 0.2–1.1)
BUN SERPL-MCNC: 18 MG/DL (ref 8–23)
CALCIUM SERPL-MCNC: 9.7 MG/DL (ref 8.3–10.4)
CASTS URNS QL MICRO: NORMAL /LPF (ref 0–2)
CHLORIDE SERPL-SCNC: 104 MMOL/L (ref 101–110)
CHOLEST SERPL-MCNC: 184 MG/DL
CO2 SERPL-SCNC: 26 MMOL/L (ref 21–32)
CREAT SERPL-MCNC: 1 MG/DL (ref 0.6–1)
CREAT UR-MCNC: 221 MG/DL
DIFFERENTIAL METHOD BLD: ABNORMAL
EOSINOPHIL # BLD: 0.3 K/UL (ref 0–0.8)
EOSINOPHIL NFR BLD: 3 % (ref 0.5–7.8)
EPI CELLS #/AREA URNS HPF: NORMAL /HPF (ref 0–5)
ERYTHROCYTE [DISTWIDTH] IN BLOOD BY AUTOMATED COUNT: 13.8 % (ref 11.9–14.6)
EST. AVERAGE GLUCOSE BLD GHB EST-MCNC: 126 MG/DL
GLOBULIN SER CALC-MCNC: 3.8 G/DL (ref 2.8–4.5)
GLUCOSE SERPL-MCNC: 127 MG/DL (ref 65–100)
HBA1C MFR BLD: 6 % (ref 4.8–5.6)
HCT VFR BLD AUTO: 47 % (ref 35.8–46.3)
HDLC SERPL-MCNC: 68 MG/DL (ref 40–60)
HDLC SERPL: 2.7
HGB BLD-MCNC: 15.4 G/DL (ref 11.7–15.4)
IMM GRANULOCYTES # BLD AUTO: 0 K/UL (ref 0–0.5)
IMM GRANULOCYTES NFR BLD AUTO: 0 % (ref 0–5)
LDLC SERPL CALC-MCNC: 97.8 MG/DL
LYMPHOCYTES # BLD: 2.3 K/UL (ref 0.5–4.6)
LYMPHOCYTES NFR BLD: 24 % (ref 13–44)
MCH RBC QN AUTO: 30 PG (ref 26.1–32.9)
MCHC RBC AUTO-ENTMCNC: 32.8 G/DL (ref 31.4–35)
MCV RBC AUTO: 91.4 FL (ref 82–102)
MICROALBUMIN UR-MCNC: 1.65 MG/DL (ref 0–3)
MICROALBUMIN/CREAT UR-RTO: 7 MG/G (ref 0–30)
MONOCYTES # BLD: 0.9 K/UL (ref 0.1–1.3)
MONOCYTES NFR BLD: 10 % (ref 4–12)
NEUTS SEG # BLD: 6.1 K/UL (ref 1.7–8.2)
NEUTS SEG NFR BLD: 62 % (ref 43–78)
NRBC # BLD: 0 K/UL (ref 0–0.2)
PLATELET # BLD AUTO: 314 K/UL (ref 150–450)
PMV BLD AUTO: 10.4 FL (ref 9.4–12.3)
POTASSIUM SERPL-SCNC: 4.1 MMOL/L (ref 3.5–5.1)
PROT SERPL-MCNC: 7.7 G/DL (ref 6.3–8.2)
RBC # BLD AUTO: 5.14 M/UL (ref 4.05–5.2)
RBC #/AREA URNS HPF: NORMAL /HPF (ref 0–5)
SODIUM SERPL-SCNC: 140 MMOL/L (ref 133–143)
TRIGL SERPL-MCNC: 91 MG/DL (ref 35–150)
TSH W FREE THYROID IF ABNORMAL: 0.8 UIU/ML (ref 0.36–3.74)
VLDLC SERPL CALC-MCNC: 18.2 MG/DL (ref 6–23)
WBC # BLD AUTO: 9.7 K/UL (ref 4.3–11.1)
WBC URNS QL MICRO: NORMAL /HPF (ref 0–4)

## 2023-02-15 PROCEDURE — G0439 PPPS, SUBSEQ VISIT: HCPCS | Performed by: NURSE PRACTITIONER

## 2023-02-15 PROCEDURE — 1123F ACP DISCUSS/DSCN MKR DOCD: CPT | Performed by: NURSE PRACTITIONER

## 2023-02-15 PROCEDURE — 3079F DIAST BP 80-89 MM HG: CPT | Performed by: NURSE PRACTITIONER

## 2023-02-15 PROCEDURE — G8484 FLU IMMUNIZE NO ADMIN: HCPCS | Performed by: NURSE PRACTITIONER

## 2023-02-15 PROCEDURE — 3075F SYST BP GE 130 - 139MM HG: CPT | Performed by: NURSE PRACTITIONER

## 2023-02-15 RX ORDER — TRAMADOL HYDROCHLORIDE 50 MG/1
50 TABLET ORAL 2 TIMES DAILY PRN
COMMUNITY
Start: 2022-12-29

## 2023-02-15 NOTE — PATIENT INSTRUCTIONS
Preventing Falls: Care Instructions  Overview     Getting around your home safely can be a challenge if you have injuries or health problems that make it easy for you to fall. Loose rugs and furniture in walkways are among the dangers for many older people who have problems walking or who have poor eyesight. People who have conditions such as arthritis, osteoporosis, or dementia also have to be careful not to fall. You can make your home safer with a few simple measures. Follow-up care is a key part of your treatment and safety. Be sure to make and go to all appointments, and call your doctor if you are having problems. It's also a good idea to know your test results and keep a list of the medicines you take. How can you care for yourself at home? Taking care of yourself  Exercise regularly to improve your strength, muscle tone, and balance. Walk if you can. Swimming may be a good choice if you cannot walk easily. Have your vision and hearing checked each year or any time you notice a change. If you have trouble seeing and hearing, you might not be able to avoid objects and could lose your balance. Know the side effects of the medicines you take. Ask your doctor or pharmacist whether the medicines you take can affect your balance. Sleeping pills or sedatives can affect your balance. Limit the amount of alcohol you drink. Alcohol can impair your balance and other senses. Ask your doctor whether calluses or corns on your feet need to be removed. If you wear loose-fitting shoes because of calluses or corns, you can lose your balance and fall. Talk to your doctor if you have numbness in your feet. You may get dizzy if you do not drink enough water. To prevent dehydration, drink plenty of fluids. Choose water and other clear liquids. If you have kidney, heart, or liver disease and have to limit fluids, talk with your doctor before you increase the amount of fluids you drink.   Preventing falls at home  Remove raised doorway thresholds, throw rugs, and clutter. Repair loose carpet or raised areas in the floor. Move furniture and electrical cords to keep them out of walking paths. Use nonskid floor wax, and wipe up spills right away, especially on ceramic tile floors. If you use a walker or cane, put rubber tips on it. If you use crutches, clean the bottoms of them regularly with an abrasive pad, such as steel wool. Keep your house well lit, especially stairways, porches, and outside walkways. Use night-lights in areas such as hallways and bathrooms. Add extra light switches or use remote switches (such as switches that go on or off when you clap your hands) to make it easier to turn lights on if you have to get up during the night. Install sturdy handrails on stairways. Move items in your cabinets so that the things you use a lot are on the lower shelves (about waist level). Keep a cordless phone and a flashlight with new batteries by your bed. If possible, put a phone in each of the main rooms of your house, or carry a cell phone in case you fall and cannot reach a phone. Or, you can wear a device around your neck or wrist. You push a button that sends a signal for help. Wear low-heeled shoes that fit well and give your feet good support. Use footwear with nonskid soles. Check the heels and soles of your shoes for wear. Repair or replace worn heels or soles. Do not wear socks without shoes on smooth floors, such as wood. Walk on the grass when the sidewalks are slippery. If you live in an area that gets snow and ice in the winter, sprinkle salt on slippery steps and sidewalks. Or ask a family member or friend to do this for you. Preventing falls in the bath  Install grab bars and nonskid mats inside and outside your shower or tub and near the toilet and sinks. Use shower chairs and bath benches. Use a hand-held shower head that will allow you to sit while showering.   Get into a tub or shower by putting the weaker leg in first. Get out of a tub or shower with your strong side first.  Repair loose toilet seats and consider installing a raised toilet seat to make getting on and off the toilet easier. Keep your bathroom door unlocked while you are in the shower. Where can you learn more? Go to http://www.hopson.com/ and enter G117 to learn more about \"Preventing Falls: Care Instructions. \"  Current as of: May 4, 2022               Content Version: 13.5  © 1623-6861 Healthwise, Incorporated. Care instructions adapted under license by [facility] ([facility]). If you have questions about a medical condition or this instruction, always ask your healthcare professional. [address] any warranty or liability for your use of this information. Hearing Loss: Care Instructions  Overview     Hearing loss is a sudden or slow decrease in how well you hear. It can range from mild to severe. Permanent hearing loss can occur with aging. It also can happen when you are exposed long-term to loud noise. Examples include listening to loud music, riding motorcycles, or being around other loud machines. Hearing loss can affect your work and home life. It can make you feel lonely or depressed. You may feel that you have lost your independence. But hearing aids and other devices can help you hear better and feel connected to others. Follow-up care is a key part of your treatment and safety. Be sure to make and go to all appointments, and call your doctor if you are having problems. It's also a good idea to know your test results and keep a list of the medicines you take. How can you care for yourself at home? Avoid loud noises whenever possible. This helps keep your hearing from getting worse. Always wear hearing protection around loud noises. Wear a hearing aid as directed. See a professional who can help you pick a hearing aid that fits you. Have hearing tests as your doctor suggests. They can show whether your hearing has changed. Your hearing aid may need to be adjusted. Use other devices as needed. These may include:  Telephone amplifiers and hearing aids that can connect to a television, stereo, radio, or microphone. Devices that use lights or vibrations. These alert you to the doorbell, a ringing telephone, or a baby monitor. Television closed-captioning. This shows the words at the bottom of the screen. Most new TVs can do this. TTY (text telephone). This lets you type messages back and forth on the telephone instead of talking or listening. These devices are also called TDD. When messages are typed on the keyboard, they are sent over the phone line to a receiving TTY. The message is shown on a monitor. Use text messaging, social media, and email if it is hard for you to communicate by telephone. Try to learn a listening technique called speechreading. It is not lipreading. You pay attention to people's gestures, expressions, posture, and tone of voice. These clues can help you understand what a person is saying. Face the person you are talking to, and have them face you. Make sure the lighting is good. You need to see the other person's face clearly. Think about counseling if you need help to adjust to your hearing loss. When should you call for help? Watch closely for changes in your health, and be sure to contact your doctor if:    You think your hearing is getting worse.     You have new symptoms, such as dizziness or nausea. Where can you learn more? Go to http://www.YouScan.com/ and enter R798 to learn more about \"Hearing Loss: Care Instructions. \"  Current as of: May 4, 2022               Content Version: 13.5  © 9261-9854 Healthwise, Incorporated. Care instructions adapted under license by [facility] [place] ([facility]).  If you have questions about a medical condition or this instruction, always ask your healthcare professional. Vicky Jaramillo any warranty or liability for your use of this information. Advance Directives: Care Instructions  Overview  An advance directive is a legal way to state your wishes at the end of your life. It tells your family and your doctor what to do if you can't say what you want. There are two main types of advance directives. You can change them any time your wishes change. Living will. This form tells your family and your doctor your wishes about life support and other treatment. The form is also called a declaration. Medical power of . This form lets you name a person to make treatment decisions for you when you can't speak for yourself. This person is called a health care agent (health care proxy, health care surrogate). The form is also called a durable power of  for health care. If you do not have an advance directive, decisions about your medical care may be made by a family member, or by a doctor or a  who doesn't know you. It may help to think of an advance directive as a gift to the people who care for you. If you have one, they won't have to make tough decisions by themselves. For more information, including forms for your state, see the [address] website (www.caringinfo.org/planning/advance-directives/). Follow-up care is a key part of your treatment and safety. Be sure to make and go to all appointments, and call your doctor if you are having problems. It's also a good idea to know your test results and keep a list of the medicines you take. What should you include in an advance directive? Many states have a unique advance directive form. (It may ask you to address specific issues.) Or you might use a universal form that's approved by many states. If your form doesn't tell you what to address, it may be hard to know what to include in your advance directive. Use the questions below to help you get started.   Who do you want to make decisions about your medical care if you are not able to? What life-support measures do you want if you have a serious illness that gets worse over time or can't be cured? What are you most afraid of that might happen? (Maybe you're afraid of having pain, losing your independence, or being kept alive by machines.)  Where would you prefer to die? (Your home? A hospital? A nursing home?)  Do you want to donate your organs when you die? Do you want certain [employer] practices performed before you die? When should you call for help? Be sure to contact your doctor if you have any questions. Where can you learn more? Go to http://www.hopson.com/ and enter R264 to learn more about \"Advance Directives: Care Instructions. \"  Current as of: June 16, 2022               Content Version: 13.5  © 8761-8522 Healthwise, [employer]. Care instructions adapted under license by [facility] ([facility]). If you have questions about a medical condition or this instruction, always ask your healthcare professional. [place] [address] any warranty or liability for your use of this information. A Healthy Heart: Care Instructions  Your Care Instructions     Coronary artery disease, also called heart disease, occurs when a substance called plaque builds up in the vessels that supply oxygen-rich blood to your heart muscle. This can narrow the blood vessels and reduce blood flow. A heart attack happens when blood flow is completely blocked. A high-fat diet, smoking, and other factors increase the risk of heart disease. Your doctor has found that you have a chance of having heart disease. You can do lots of things to keep your heart healthy. It may not be easy, but you can change your diet, exercise more, and quit smoking. These steps really work to lower your chance of heart disease. Follow-up care is a key part of your treatment and safety. Be sure to make and go to all appointments, and call your doctor if you are having problems.  It's also a good idea to know your test results and keep a list of the medicines you take. How can you care for yourself at home? Diet    Use less salt when you cook and eat. This helps lower your blood pressure. Taste food before salting. Add only a little salt when you think you need it. With time, your taste buds will adjust to less salt.     Eat fewer snack items, fast foods, canned soups, and other high-salt, high-fat, processed foods.     Read food labels and try to avoid saturated and trans fats. They increase your risk of heart disease by raising cholesterol levels.     Limit the amount of solid fat-butter, margarine, and shortening-you eat. Use olive, peanut, or canola oil when you cook. Bake, broil, and steam foods instead of frying them.     Eat a variety of fruit and vegetables every day. Dark green, deep orange, red, or yellow fruits and vegetables are especially good for you. Examples include spinach, carrots, peaches, and berries.     Foods high in fiber can reduce your cholesterol and provide important vitamins and minerals. High-fiber foods include whole-grain cereals and breads, oatmeal, beans, brown rice, citrus fruits, and apples.     Eat lean proteins. Heart-healthy proteins include seafood, lean meats and poultry, eggs, beans, peas, nuts, seeds, and soy products.     Limit drinks and foods with added sugar. These include candy, desserts, and soda pop. Lifestyle changes    If your doctor recommends it, get more exercise. Walking is a good choice. Bit by bit, increase the amount you walk every day. Try for at least 30 minutes on most days of the week. You also may want to swim, bike, or do other activities.     Do not smoke. If you need help quitting, talk to your doctor about stop-smoking programs and medicines. These can increase your chances of quitting for good. Quitting smoking may be the most important step you can take to protect your heart.  It is never too late to quit.     Limit alcohol to 2 drinks a day for men and 1 drink a day for women. Too much alcohol can cause health problems.     Manage other health problems such as diabetes, high blood pressure, and high cholesterol. If you think you may have a problem with alcohol or drug use, talk to your doctor. Medicines    Take your medicines exactly as prescribed. Call your doctor if you think you are having a problem with your medicine.     If your doctor recommends aspirin, take the amount directed each day. Make sure you take aspirin and not another kind of pain reliever, such as acetaminophen (Tylenol). When should you call for help? Call 911 if you have symptoms of a heart attack. These may include:    Chest pain or pressure, or a strange feeling in the chest.     Sweating.     Shortness of breath.     Pain, pressure, or a strange feeling in the back, neck, jaw, or upper belly or in one or both shoulders or arms.     Lightheadedness or sudden weakness.     A fast or irregular heartbeat. After you call 911, the  may tell you to chew 1 adult-strength or 2 to 4 low-dose aspirin. Wait for an ambulance. Do not try to drive yourself. Watch closely for changes in your health, and be sure to contact your doctor if you have any problems. Where can you learn more? Go to http://www.hopson.com/ and enter F075 to learn more about \"A Healthy Heart: Care Instructions. \"  Current as of: September 7, 2022               Content Version: 13.5  © 5542-8180 Healthwise, Incorporated. Care instructions adapted under license by [facility] ([facility]). If you have questions about a medical condition or this instruction, always ask your healthcare professional. [place] [address] any warranty or liability for your use of this information. Personalized Preventive Plan for Will Villalobos - 2/15/2023  Medicare offers a range of preventive health benefits.  Some of the tests and screenings are paid in full while other may be subject to a deductible, co-insurance, and/or copay. Some of these benefits include a comprehensive review of your medical history including lifestyle, illnesses that may run in your family, and various assessments and screenings as appropriate. After reviewing your medical record and screening and assessments performed today your provider may have ordered immunizations, labs, imaging, and/or referrals for you. A list of these orders (if applicable) as well as your Preventive Care list are included within your After Visit Summary for your review. Other Preventive Recommendations:    A preventive eye exam performed by an eye specialist is recommended every 1-2 years to screen for glaucoma; cataracts, macular degeneration, and other eye disorders. A preventive dental visit is recommended every 6 months. Try to get at least 150 minutes of exercise per week or 10,000 steps per day on a pedometer . Order or download the FREE \"Exercise & Physical Activity: Your Everyday Guide\" from [employer]. Call 4-693.328.8351 or search [employer] online. You need 8706-1089 mg of calcium and 5364-5620 IU of vitamin D per day. It is possible to meet your calcium requirement with diet alone, but a vitamin D supplement is usually necessary to meet this goal.  When exposed to the sun, use a sunscreen that protects against both UVA and UVB radiation with an SPF of 30 or greater. Reapply every 2 to 3 hours or after sweating, drying off with a towel, or swimming. Always wear a seat belt when traveling in a car. Always wear a helmet when riding a bicycle or motorcycle.

## 2023-02-15 NOTE — PROGRESS NOTES
Medicare Annual Wellness Visit    [address] is here for Medicare AWV (Pt here for AWE part one and she is fasting today )    Assessment & Plan   Medicare annual wellness visit, subsequent  Discussed BMI and healthy weight and diet, weight bearing exercise, smoking avoidance, sun protection and medication compliance. Reviewed appropriate health maintenance screening. The patient was counseled regarding regular monthly SBE, screening procedures and recommended schedules for immunizations, mammography, Pap smears, GI hemoccult testing, colonoscopy and BMD.  Order for DEXA. Searching for influenza vaccine date. ACP on file and current. .     Essential hypertension  -     Urinalysis, Micro; Future  Hyperlipidemia, unspecified hyperlipidemia type  -     CBC with Auto Differential; Future  -     Comprehensive Metabolic Panel; Future  -     Lipid Panel; Future  Type 2 diabetes mellitus with peripheral vascular disease (HCC)  -     Hemoglobin A1C; Future  -     Microalbumin / Creatinine Urine Ratio; Future  Opioid use, unspecified with unspecified opioid-induced disorder ([facility] [address]  Hypothyroidism, unspecified type  -     TSH with Reflex; Future  Age-related osteoporosis without current pathological fracture  -     denosumab (PROLIA) 60 MG/ML SOSY SC injection; Inject 1 mL into the skin once for 1 dose, Disp-180 mL, R-1NO PRINT  -     DEXA BONE DENSITY AXIAL SKELETON; Future  Right hip pain      Recommendations for Preventive Services Due: see orders and patient instructions/AVS.  Recommended screening schedule for the next 5-10 years is provided to the patient in written form: see Patient Instructions/AVS.     Return for Medicare Annual Wellness Visit in 1 year. Subjective   The following acute and/or chronic problems were also addressed today:      Patient's complete Health Risk Assessment and screening values have been reviewed and are found in Flowsheets.  The following problems were reviewed today and where indicated follow up appointments were made and/or referrals ordered. Positive Risk Factor Screenings with Interventions:    Fall Risk:  Do you feel unsteady or are you worried about falling? : no  2 or more falls in past year?: (!) yes  Fall with injury in past year?: no     Interventions:    Patient declines any further evaluation or treatment  Ongoing instability. Not interested in PT. Agrees to use a cane for stability. Opioid Risk: (Low risk score <55) Opioid risk score: 15    Patient is low risk for opioid use disorder or overdose. Last PDMP Toni as Reviewed:  Review User Review Instant Review Result                    Weight and Activity:  Physical Activity: Inactive    Days of Exercise per Week: 0 days    Minutes of Exercise per Session: 0 min     On average, how many days per week do you engage in moderate to strenuous exercise (like a brisk walk)?: 0 days  Have you lost any weight without trying in the past 3 months?: No  Body mass index: (!) 28.72      Inactivity Interventions:  Unable to exercise due to hip pain. Objective   Vitals:    02/15/23 0817   BP: 136/84   Weight: 152 lb (68.9 kg)   Height: 5' 1\" (1.549 m)      Body mass index is 28.72 kg/m². Allergies   Allergen Reactions    Lactose Other (See Comments)     Abdominal pain, irritable bowel    Nexletol [Bempedoic Acid] Diarrhea    Statins Myalgia     Only allergic to pravastatin     Prior to Visit Medications    Medication Sig Taking? Authorizing Provider   traMADol (ULTRAM) 50 MG tablet Take 50 mg by mouth 2 times daily as needed.  Yes Historical Provider, MD   denosumab (PROLIA) 60 MG/ML SOSY SC injection Inject 1 mL into the skin once for 1 dose Yes Kym Brantley APRN - CNP   sertraline (ZOLOFT) 50 MG tablet Take 1 tablet by mouth daily Yes Chasity Armendariz MD   lisinopril (PRINIVIL;ZESTRIL) 20 MG tablet Take 1 tablet by mouth 2 times daily Yes Chasity Armendariz MD levothyroxine (SYNTHROID) 88 MCG tablet Take 1/2 tab on Sunday and one all other days Indications: a condition with low thyroid hormone levels  Patient taking differently: Take 88 mcg by mouth Daily Take 1/2 tab on Sunday and one all other days Indications: a condition with low thyroid hormone levels Yes Gilma Noyola MD   atorvastatin (LIPITOR) 10 MG tablet Take 1 tablet by mouth daily  Patient taking differently: Take 10 mg by mouth every evening Yes Gilma Noyola MD   Multiple Vitamin (MULTI-VITAMINS PO) Take 1 tablet by mouth daily Yes Historical Provider, MD   aspirin 81 MG EC tablet Take 81 mg by mouth 2 times daily Yes Ar Automatic Reconciliation   Calcium Carbonate-Vitamin D (CALCIUM-VITAMIN D) 600-125 MG-UNIT TABS Take 1 tablet by mouth Daily with lunch Yes Ar Automatic Reconciliation   coenzyme Q10 100 MG CAPS capsule Take 100 mg by mouth Daily with lunch Yes Ar Automatic Reconciliation   denosumab (PROLIA) 60 MG/ML SOSY SC injection Inject 60 mg into the skin every 6 months Yes Ar Automatic Reconciliation   meclizine (ANTIVERT) 25 MG tablet Take 25 mg by mouth 3 times daily as needed Yes Ar Automatic Reconciliation   polyethylene glycol (GLYCOLAX) 17 GM/SCOOP powder Take 17 g by mouth daily as needed Yes Ar Automatic Reconciliation       CareTeam (Including outside providers/suppliers regularly involved in providing care):   Patient Care Team:  Gilma Noyola MD as PCP - Fritz Cushing, MD as PCP - Empaneled Provider  Rachelle Tamayo (Audiology)  OTTO Holman CNP as Nurse Practitioner (Vascular Surgery)     Reviewed and updated this visit:  Tobacco  Allergies  Meds  Problems  Med Hx  Surg Hx  Soc Hx  Fam Hx               OTTO Lewis - CRISTY

## 2023-02-20 PROBLEM — I73.9 PERIPHERAL VASCULAR DISEASE, UNSPECIFIED (HCC): Status: ACTIVE | Noted: 2023-02-20

## 2023-02-21 ENCOUNTER — OFFICE VISIT (OUTPATIENT)
Dept: INTERNAL MEDICINE CLINIC | Facility: CLINIC | Age: 83
End: 2023-02-21
Payer: MEDICARE

## 2023-02-21 VITALS
SYSTOLIC BLOOD PRESSURE: 132 MMHG | WEIGHT: 156 LBS | HEIGHT: 61 IN | BODY MASS INDEX: 29.45 KG/M2 | DIASTOLIC BLOOD PRESSURE: 86 MMHG

## 2023-02-21 DIAGNOSIS — E03.9 ACQUIRED HYPOTHYROIDISM: ICD-10-CM

## 2023-02-21 DIAGNOSIS — M81.0 AGE-RELATED OSTEOPOROSIS WITHOUT CURRENT PATHOLOGICAL FRACTURE: Primary | ICD-10-CM

## 2023-02-21 DIAGNOSIS — I10 ESSENTIAL HYPERTENSION: ICD-10-CM

## 2023-02-21 DIAGNOSIS — I73.9 PERIPHERAL VASCULAR DISEASE, UNSPECIFIED (HCC): ICD-10-CM

## 2023-02-21 DIAGNOSIS — R42 DIZZINESS AND GIDDINESS: ICD-10-CM

## 2023-02-21 DIAGNOSIS — F11.99 OPIOID USE, UNSPECIFIED WITH UNSPECIFIED OPIOID-INDUCED DISORDER (HCC): ICD-10-CM

## 2023-02-21 DIAGNOSIS — I65.22 LEFT CAROTID STENOSIS: ICD-10-CM

## 2023-02-21 DIAGNOSIS — E11.51 TYPE 2 DIABETES MELLITUS WITH PERIPHERAL VASCULAR DISEASE (HCC): ICD-10-CM

## 2023-02-21 DIAGNOSIS — M19.91 PRIMARY OSTEOARTHRITIS, UNSPECIFIED SITE: ICD-10-CM

## 2023-02-21 DIAGNOSIS — E78.5 HYPERLIPIDEMIA, UNSPECIFIED HYPERLIPIDEMIA TYPE: ICD-10-CM

## 2023-02-21 DIAGNOSIS — Z78.9 STATIN INTOLERANCE: ICD-10-CM

## 2023-02-21 PROCEDURE — 3044F HG A1C LEVEL LT 7.0%: CPT | Performed by: INTERNAL MEDICINE

## 2023-02-21 PROCEDURE — 1123F ACP DISCUSS/DSCN MKR DOCD: CPT | Performed by: INTERNAL MEDICINE

## 2023-02-21 PROCEDURE — 1036F TOBACCO NON-USER: CPT | Performed by: INTERNAL MEDICINE

## 2023-02-21 PROCEDURE — G8484 FLU IMMUNIZE NO ADMIN: HCPCS | Performed by: INTERNAL MEDICINE

## 2023-02-21 PROCEDURE — G8427 DOCREV CUR MEDS BY ELIG CLIN: HCPCS | Performed by: INTERNAL MEDICINE

## 2023-02-21 PROCEDURE — G8399 PT W/DXA RESULTS DOCUMENT: HCPCS | Performed by: INTERNAL MEDICINE

## 2023-02-21 PROCEDURE — 99214 OFFICE O/P EST MOD 30 MIN: CPT | Performed by: INTERNAL MEDICINE

## 2023-02-21 PROCEDURE — 3075F SYST BP GE 130 - 139MM HG: CPT | Performed by: INTERNAL MEDICINE

## 2023-02-21 PROCEDURE — G8417 CALC BMI ABV UP PARAM F/U: HCPCS | Performed by: INTERNAL MEDICINE

## 2023-02-21 PROCEDURE — 3079F DIAST BP 80-89 MM HG: CPT | Performed by: INTERNAL MEDICINE

## 2023-02-21 PROCEDURE — 1090F PRES/ABSN URINE INCON ASSESS: CPT | Performed by: INTERNAL MEDICINE

## 2023-02-21 RX ORDER — TRAMADOL HYDROCHLORIDE 50 MG/1
50 TABLET ORAL 2 TIMES DAILY PRN
Qty: 60 TABLET | Refills: 5 | Status: SHIPPED | OUTPATIENT
Start: 2023-02-21 | End: 2023-03-23

## 2023-02-21 SDOH — HEALTH STABILITY: PHYSICAL HEALTH: ON AVERAGE, HOW MANY MINUTES DO YOU ENGAGE IN EXERCISE AT THIS LEVEL?: 30 MIN

## 2023-02-21 SDOH — HEALTH STABILITY: PHYSICAL HEALTH: ON AVERAGE, HOW MANY MINUTES DO YOU ENGAGE IN EXERCISE AT THIS LEVEL?: 20 MIN

## 2023-02-21 SDOH — HEALTH STABILITY: PHYSICAL HEALTH: ON AVERAGE, HOW MANY DAYS PER WEEK DO YOU ENGAGE IN MODERATE TO STRENUOUS EXERCISE (LIKE A BRISK WALK)?: 7 DAYS

## 2023-02-21 ASSESSMENT — ENCOUNTER SYMPTOMS
ABDOMINAL PAIN: 0
SHORTNESS OF BREATH: 0

## 2023-02-21 NOTE — PROGRESS NOTES
She had Covid in Dec with a headache and fatigue  Energy better, trying to be more active  No symptoms due to carotid disease      Past Medical History, Past Surgical History, Family history, Social History, and Medications were all reviewed with the patient today and updated as necessary. Current Outpatient Medications   Medication Sig Dispense Refill    traMADol (ULTRAM) 50 MG tablet Take 50 mg by mouth 2 times daily as needed. sertraline (ZOLOFT) 50 MG tablet Take 1 tablet by mouth daily 90 tablet 3    lisinopril (PRINIVIL;ZESTRIL) 20 MG tablet Take 1 tablet by mouth 2 times daily 180 tablet 3    levothyroxine (SYNTHROID) 88 MCG tablet Take 1/2 tab on Sunday and one all other days Indications: a condition with low thyroid hormone levels 90 tablet 3    atorvastatin (LIPITOR) 10 MG tablet Take 1 tablet by mouth daily (Patient taking differently: Take 10 mg by mouth every evening) 90 tablet 3    Multiple Vitamin (MULTI-VITAMINS PO) Take 1 tablet by mouth daily      aspirin 81 MG EC tablet Take 81 mg by mouth 2 times daily      Calcium Carbonate-Vitamin D (CALCIUM-VITAMIN D) 600-125 MG-UNIT TABS Take 1 tablet by mouth Daily with lunch      coenzyme Q10 100 MG CAPS capsule Take 100 mg by mouth Daily with lunch      denosumab (PROLIA) 60 MG/ML SOSY SC injection Inject 60 mg into the skin every 6 months      meclizine (ANTIVERT) 25 MG tablet Take 25 mg by mouth 3 times daily as needed      polyethylene glycol (GLYCOLAX) 17 GM/SCOOP powder Take 17 g by mouth daily as needed      denosumab (PROLIA) 60 MG/ML SOSY SC injection Inject 1 mL into the skin once for 1 dose 180 mL 1     No current facility-administered medications for this visit.      Allergies   Allergen Reactions    Lactose Other (See Comments)     Abdominal pain, irritable bowel    Nexletol [Bempedoic Acid] Diarrhea    Statins Myalgia     Only allergic to pravastatin     Patient Active Problem List   Diagnosis    Primary osteoarthritis    Irritable bowel syndrome    Statin intolerance    Pain in both feet    Opioid use, unspecified with unspecified opioid-induced disorder (HCC)    Dizziness    TIA (transient ischemic attack)    Essential hypertension    Type 2 diabetes mellitus with peripheral vascular disease (HCC)    Left carotid stenosis    Osteoporosis    Insomnia    Hypothyroidism    Sciatica    Left hip pain    Hyperlipidemia    H/O carotid endarterectomy    Peripheral vascular disease, unspecified ([facility] [address]         Review of Systems   Eyes:  Negative for visual disturbance. Respiratory:  Negative for shortness of breath. Gastrointestinal:  Negative for abdominal pain. Musculoskeletal:  Positive for arthralgias. Joints hurt a lot, raphael R hip and back and neck   Neurological:  Negative for speech difficulty and weakness. Psychiatric/Behavioral:  Negative for sleep disturbance. OBJECTIVE:  /86   Ht 5' 1\" (1.549 m)   Wt 156 lb (70.8 kg)   BMI 29.48 kg/m²      Physical Exam  Constitutional:       Appearance: Normal appearance. HENT:      Head: Normocephalic. Right Ear: Tympanic membrane normal.      Left Ear: Tympanic membrane normal.      Mouth/Throat:      Mouth: Mucous membranes are moist.      Pharynx: Oropharynx is clear. Eyes:      Extraocular Movements: Extraocular movements intact. Pupils: Pupils are equal, round, and reactive to light. Neck:      Thyroid: No thyromegaly. Cardiovascular:      Rate and Rhythm: Normal rate and regular rhythm. Pulses: Normal pulses. Heart sounds: No murmur heard. Pulmonary:      Effort: Pulmonary effort is normal.      Breath sounds: Normal breath sounds. Abdominal:      General: Abdomen is flat. Bowel sounds are normal.      Palpations: Abdomen is soft. There is no hepatomegaly or splenomegaly. Musculoskeletal:         General: No swelling. Cervical back: Normal range of motion. Right lower leg: No edema. Left lower leg: No edema.    Skin: General: Skin is warm and dry.   Neurological:      General: No focal deficit present.      Mental Status: She is alert and oriented to person, place, and time.      Gait: Gait normal.      Deep Tendon Reflexes: Reflexes normal.      Reflex Scores:       Patellar reflexes are 2+ on the right side and 2+ on the left side.  Psychiatric:         Mood and Affect: Mood normal.         Behavior: Behavior normal.        Medical problems and test results were reviewed with the patient today.     Recent Results (from the past 672 hour(s))   Urinalysis, Micro    Collection Time: 02/15/23  9:05 AM   Result Value Ref Range    WBC, UA 0-4 0 - 4 /hpf    RBC, UA 0-5 0 - 5 /hpf    Epithelial Cells UA 0-5 0 - 5 /hpf    BACTERIA, URINE Negative Negative /hpf    Casts 0-2 0 - 2 /lpf   TSH with Reflex    Collection Time: 02/15/23  9:05 AM   Result Value Ref Range    TSH w Free Thyroid if Abnormal 0.80 0.358 - 3.740 UIU/ML   Microalbumin / Creatinine Urine Ratio    Collection Time: 02/15/23  9:05 AM   Result Value Ref Range    Microalbumin, Random Urine 1.65 0.0 - 3.0 MG/DL    Creatinine, Ur 221.00 mg/dL    Microalbumin Creatinine Ratio 7 0 - 30 mg/g   Lipid Panel    Collection Time: 02/15/23  9:05 AM   Result Value Ref Range    Cholesterol, Total 184 <200 MG/DL    Triglycerides 91 35 - 150 MG/DL    HDL 68 (H) 40 - 60 MG/DL    LDL Calculated 97.8 <100 MG/DL    VLDL Cholesterol Calculated 18.2 6.0 - 23.0 MG/DL    Chol/HDL Ratio 2.7     Hemoglobin A1C    Collection Time: 02/15/23  9:05 AM   Result Value Ref Range    Hemoglobin A1C 6.0 (H) 4.8 - 5.6 %    eAG 126 mg/dL   Comprehensive Metabolic Panel    Collection Time: 02/15/23  9:05 AM   Result Value Ref Range    Sodium 140 133 - 143 mmol/L    Potassium 4.1 3.5 - 5.1 mmol/L    Chloride 104 101 - 110 mmol/L    CO2 26 21 - 32 mmol/L    Anion Gap 10 2 - 11 mmol/L    Glucose 127 (H) 65 - 100 mg/dL    BUN 18 8 - 23 MG/DL    Creatinine 1.00 0.6 - 1.0 MG/DL    Est, Glom Filt Rate 56 (L) >60  ml/min/1.73m2    Calcium 9.7 8.3 - 10.4 MG/DL    Total Bilirubin 0.6 0.2 - 1.1 MG/DL    ALT 23 12 - 65 U/L    AST 23 15 - 37 U/L    Alk Phosphatase 102 50 - 136 U/L    Total Protein 7.7 6.3 - 8.2 g/dL    Albumin 3.9 3.2 - 4.6 g/dL    Globulin 3.8 2.8 - 4.5 g/dL    Albumin/Globulin Ratio 1.0 0.4 - 1.6     CBC with Auto Differential    Collection Time: 02/15/23  9:05 AM   Result Value Ref Range    WBC 9.7 4.3 - 11.1 K/uL    RBC 5.14 4.05 - 5.2 M/uL    Hemoglobin 15.4 11.7 - 15.4 g/dL    Hematocrit 47.0 (H) 35.8 - 46.3 %    MCV 91.4 82 - 102 FL    MCH 30.0 26.1 - 32.9 PG    MCHC 32.8 31.4 - 35.0 g/dL    RDW 13.8 11.9 - 14.6 %    Platelets 180 844 - 706 K/uL    MPV 10.4 9.4 - 12.3 FL    nRBC 0.00 0.0 - 0.2 K/uL    Differential Type AUTOMATED      Seg Neutrophils 62 43 - 78 %    Lymphocytes 24 13 - 44 %    Monocytes 10 4.0 - 12.0 %    Eosinophils % 3 0.5 - 7.8 %    Basophils 1 0.0 - 2.0 %    Immature Granulocytes 0 0.0 - 5.0 %    Segs Absolute 6.1 1.7 - 8.2 K/UL    Absolute Lymph # 2.3 0.5 - 4.6 K/UL    Absolute Mono # 0.9 0.1 - 1.3 K/UL    Absolute Eos # 0.3 0.0 - 0.8 K/UL    Basophils Absolute 0.1 0.0 - 0.2 K/UL    Absolute Immature Granulocyte 0.0 0.0 - 0.5 K/UL         ASSESSMENT and PLAN    1. Age-related osteoporosis without current pathological fracture  2. Dizziness and giddiness  3. Essential hypertension  4. Hyperlipidemia, unspecified hyperlipidemia type  5. Acquired hypothyroidism  6. Peripheral vascular disease, unspecified ([facility] [address]  7. Type 2 diabetes mellitus with peripheral vascular disease ([address]  8. Left carotid stenosis  Comments:  last US 10/22 followed by vascular Dr Mag Rush  9. Primary osteoarthritis, unspecified site  The following orders have not been finalized:  -     traMADol (ULTRAM) 50 MG tablet    Remotely she took osteoporosis pills, does not remember problems with it.  Will renew Prolia and due for BMD soon    Using pain med every night for sleep and only infrequently otherwise    No recent dizziness    Tolerating statin and LDL 97, unable to go up on dose     A1C stable despite not eating well     She will work on weight as it is creeping up

## 2023-03-27 ENCOUNTER — HOSPITAL ENCOUNTER (OUTPATIENT)
Dept: MAMMOGRAPHY | Age: 83
Discharge: HOME | End: 2023-03-30
Payer: MEDICARE

## 2023-03-27 DIAGNOSIS — M81.0 AGE-RELATED OSTEOPOROSIS WITHOUT CURRENT PATHOLOGICAL FRACTURE: ICD-10-CM

## 2023-03-27 PROCEDURE — 77080 DXA BONE DENSITY AXIAL: CPT

## 2023-04-03 PROBLEM — E11.21 TYPE 2 DIABETES WITH NEPHROPATHY (HCC): Status: RESOLVED | Noted: 2019-01-29 | Resolved: 2021-02-17

## 2023-05-02 ENCOUNTER — OFFICE VISIT (OUTPATIENT)
Dept: VASCULAR SURGERY | Age: 83
End: 2023-05-02
Payer: MEDICARE

## 2023-05-02 VITALS
DIASTOLIC BLOOD PRESSURE: 94 MMHG | SYSTOLIC BLOOD PRESSURE: 145 MMHG | OXYGEN SATURATION: 93 % | HEART RATE: 97 BPM | BODY MASS INDEX: 29.27 KG/M2 | WEIGHT: 155 LBS | HEIGHT: 61 IN

## 2023-05-02 DIAGNOSIS — I65.23 BILATERAL CAROTID ARTERY STENOSIS: Primary | ICD-10-CM

## 2023-05-02 PROCEDURE — 1036F TOBACCO NON-USER: CPT | Performed by: SURGERY

## 2023-05-02 PROCEDURE — 99213 OFFICE O/P EST LOW 20 MIN: CPT | Performed by: SURGERY

## 2023-05-02 PROCEDURE — G8399 PT W/DXA RESULTS DOCUMENT: HCPCS | Performed by: SURGERY

## 2023-05-02 PROCEDURE — G8417 CALC BMI ABV UP PARAM F/U: HCPCS | Performed by: SURGERY

## 2023-05-02 PROCEDURE — 3080F DIAST BP >= 90 MM HG: CPT | Performed by: SURGERY

## 2023-05-02 PROCEDURE — G8427 DOCREV CUR MEDS BY ELIG CLIN: HCPCS | Performed by: SURGERY

## 2023-05-02 PROCEDURE — 1123F ACP DISCUSS/DSCN MKR DOCD: CPT | Performed by: SURGERY

## 2023-05-02 PROCEDURE — 3077F SYST BP >= 140 MM HG: CPT | Performed by: SURGERY

## 2023-05-02 PROCEDURE — 1090F PRES/ABSN URINE INCON ASSESS: CPT | Performed by: SURGERY

## 2023-05-02 NOTE — PROGRESS NOTES
[address]   [address] [facility] FAX: [phone] Sandra Siddiqui  : 1940    Chief Complaint:     History of Present Illness:   Patient follows up today for follow-up carotid disease. Patient is a well-known left carotid stenosis greater than 70%. Per documentation the lesion is very high at the base of C1. I reviewed her PACS images unable to see any CT scan of the last several years, unsure why that is. CURRENT MEDICATIONS:  Current Outpatient Medications   Medication Sig Dispense Refill    sertraline (ZOLOFT) 50 MG tablet Take 1 tablet by mouth daily 90 tablet 3    lisinopril (PRINIVIL;ZESTRIL) 20 MG tablet Take 1 tablet by mouth 2 times daily 180 tablet 3    levothyroxine (SYNTHROID) 88 MCG tablet Take 1/2 tab on  and one all other days Indications: a condition with low thyroid hormone levels 90 tablet 3    atorvastatin (LIPITOR) 10 MG tablet Take 1 tablet by mouth daily (Patient taking differently: Take 1 tablet by mouth every evening) 90 tablet 3    Multiple Vitamin (MULTI-VITAMINS PO) Take 1 tablet by mouth daily      aspirin 81 MG EC tablet Take 1 tablet by mouth 2 times daily      Calcium Carbonate-Vitamin D (CALCIUM-VITAMIN D) 600-125 MG-UNIT TABS Take 1 tablet by mouth Daily with lunch      coenzyme Q10 100 MG CAPS capsule Take 1 capsule by mouth Daily with lunch      meclizine (ANTIVERT) 25 MG tablet Take 1 tablet by mouth 3 times daily as needed      polyethylene glycol (GLYCOLAX) 17 GM/SCOOP powder Take 17 g by mouth daily as needed      denosumab (PROLIA) 60 MG/ML SOSY SC injection Inject 1 mL into the skin every 6 months (Patient not taking: Reported on 2023) 180 mL 3     No current facility-administered medications for this visit.        Past Medical History:   Diagnosis Date    Carotid artery stenosis 10/1/2014    Depression 2016    Hyperlipidemia     Insomnia 2016    Irritable bowel syndrome 2016

## 2023-05-30 DIAGNOSIS — E03.9 ACQUIRED HYPOTHYROIDISM: ICD-10-CM

## 2023-05-30 RX ORDER — LEVOTHYROXINE SODIUM 88 UG/1
TABLET ORAL
Qty: 90 TABLET | Refills: 3 | Status: SHIPPED | OUTPATIENT
Start: 2023-05-30

## 2023-05-30 NOTE — TELEPHONE ENCOUNTER
NEEDING:  - Levothyroxine--88 mg/three pills in bottle/1 mth supply/[facility] [address]/[phone]    Her prescription refill was denied at the pharmacy. Her  stated that she has to have it. She can't stop taking it. Please call Mr. Chaudhariflory Enoch at [phone].

## 2023-08-14 DIAGNOSIS — E03.9 ACQUIRED HYPOTHYROIDISM: ICD-10-CM

## 2023-08-14 DIAGNOSIS — I10 ESSENTIAL HYPERTENSION: ICD-10-CM

## 2023-08-14 DIAGNOSIS — F32.A DEPRESSION, UNSPECIFIED DEPRESSION TYPE: ICD-10-CM

## 2023-08-14 RX ORDER — LEVOTHYROXINE SODIUM 88 UG/1
TABLET ORAL
Qty: 90 TABLET | Refills: 3 | Status: SHIPPED | OUTPATIENT
Start: 2023-08-14

## 2023-08-14 RX ORDER — LISINOPRIL 20 MG/1
20 TABLET ORAL 2 TIMES DAILY
Qty: 180 TABLET | Refills: 3 | Status: SHIPPED | OUTPATIENT
Start: 2023-08-14

## 2023-08-14 NOTE — TELEPHONE ENCOUNTER
Refill:    Sertraline 50 mg  Lisinopril 20 mg  Levothyroxine 88 mcg      Send:  [facility] at [address] [facility]

## 2023-09-07 DIAGNOSIS — E11.51 TYPE 2 DIABETES MELLITUS WITH PERIPHERAL VASCULAR DISEASE (HCC): ICD-10-CM

## 2023-09-07 DIAGNOSIS — I10 ESSENTIAL HYPERTENSION: ICD-10-CM

## 2023-09-07 DIAGNOSIS — E78.5 HYPERLIPIDEMIA, UNSPECIFIED HYPERLIPIDEMIA TYPE: ICD-10-CM

## 2023-09-07 DIAGNOSIS — E03.9 ACQUIRED HYPOTHYROIDISM: ICD-10-CM

## 2023-09-07 LAB
ANION GAP SERPL CALC-SCNC: 6 MMOL/L (ref 2–11)
BUN SERPL-MCNC: 19 MG/DL (ref 8–23)
CALCIUM SERPL-MCNC: 9.1 MG/DL (ref 8.3–10.4)
CHLORIDE SERPL-SCNC: 106 MMOL/L (ref 101–110)
CHOLEST SERPL-MCNC: 164 MG/DL
CO2 SERPL-SCNC: 28 MMOL/L (ref 21–32)
CREAT SERPL-MCNC: 1 MG/DL (ref 0.6–1)
EST. AVERAGE GLUCOSE BLD GHB EST-MCNC: 128 MG/DL
GLUCOSE SERPL-MCNC: 104 MG/DL (ref 65–100)
HBA1C MFR BLD: 6.1 % (ref 4.8–5.6)
HDLC SERPL-MCNC: 63 MG/DL (ref 40–60)
HDLC SERPL: 2.6
LDLC SERPL CALC-MCNC: 85.6 MG/DL
POTASSIUM SERPL-SCNC: 4.7 MMOL/L (ref 3.5–5.1)
SODIUM SERPL-SCNC: 140 MMOL/L (ref 133–143)
TRIGL SERPL-MCNC: 77 MG/DL (ref 35–150)
TSH, 3RD GENERATION: 1.24 UIU/ML (ref 0.36–3.74)
VLDLC SERPL CALC-MCNC: 15.4 MG/DL (ref 6–23)

## 2023-10-11 ASSESSMENT — PATIENT HEALTH QUESTIONNAIRE - PHQ9
3. TROUBLE FALLING OR STAYING ASLEEP: SEVERAL DAYS
5. POOR APPETITE OR OVEREATING: SEVERAL DAYS
SUM OF ALL RESPONSES TO PHQ QUESTIONS 1-9: 3
SUM OF ALL RESPONSES TO PHQ QUESTIONS 1-9: 3
6. FEELING BAD ABOUT YOURSELF - OR THAT YOU ARE A FAILURE OR HAVE LET YOURSELF OR YOUR FAMILY DOWN: NOT AT ALL
9. THOUGHTS THAT YOU WOULD BE BETTER OFF DEAD, OR OF HURTING YOURSELF: 0
SUM OF ALL RESPONSES TO PHQ QUESTIONS 1-9: 3
7. TROUBLE CONCENTRATING ON THINGS, SUCH AS READING THE NEWSPAPER OR WATCHING TELEVISION: 0
4. FEELING TIRED OR HAVING LITTLE ENERGY: SEVERAL DAYS
8. MOVING OR SPEAKING SO SLOWLY THAT OTHER PEOPLE COULD HAVE NOTICED. OR THE OPPOSITE - BEING SO FIDGETY OR RESTLESS THAT YOU HAVE BEEN MOVING AROUND A LOT MORE THAN USUAL: NOT AT ALL
7. TROUBLE CONCENTRATING ON THINGS, SUCH AS READING THE NEWSPAPER OR WATCHING TELEVISION: NOT AT ALL
1. LITTLE INTEREST OR PLEASURE IN DOING THINGS: NOT AT ALL
10. IF YOU CHECKED OFF ANY PROBLEMS, HOW DIFFICULT HAVE THESE PROBLEMS MADE IT FOR YOU TO DO YOUR WORK, TAKE CARE OF THINGS AT HOME, OR GET ALONG WITH OTHER PEOPLE: 0
8. MOVING OR SPEAKING SO SLOWLY THAT OTHER PEOPLE COULD HAVE NOTICED. OR THE OPPOSITE, BEING SO FIGETY OR RESTLESS THAT YOU HAVE BEEN MOVING AROUND A LOT MORE THAN USUAL: 0
9. THOUGHTS THAT YOU WOULD BE BETTER OFF DEAD, OR OF HURTING YOURSELF: NOT AT ALL
SUM OF ALL RESPONSES TO PHQ QUESTIONS 1-9: 3
2. FEELING DOWN, DEPRESSED OR HOPELESS: NOT AT ALL
1. LITTLE INTEREST OR PLEASURE IN DOING THINGS: 0
5. POOR APPETITE OR OVEREATING: 1
SUM OF ALL RESPONSES TO PHQ9 QUESTIONS 1 & 2: 0
10. IF YOU CHECKED OFF ANY PROBLEMS, HOW DIFFICULT HAVE THESE PROBLEMS MADE IT FOR YOU TO DO YOUR WORK, TAKE CARE OF THINGS AT HOME, OR GET ALONG WITH OTHER PEOPLE: NOT DIFFICULT AT ALL
2. FEELING DOWN, DEPRESSED OR HOPELESS: 0
3. TROUBLE FALLING OR STAYING ASLEEP: 1
SUM OF ALL RESPONSES TO PHQ QUESTIONS 1-9: 3
4. FEELING TIRED OR HAVING LITTLE ENERGY: 1
6. FEELING BAD ABOUT YOURSELF - OR THAT YOU ARE A FAILURE OR HAVE LET YOURSELF OR YOUR FAMILY DOWN: 0

## 2023-10-16 ENCOUNTER — OFFICE VISIT (OUTPATIENT)
Dept: INTERNAL MEDICINE CLINIC | Facility: CLINIC | Age: 83
End: 2023-10-16
Payer: MEDICARE

## 2023-10-16 VITALS
SYSTOLIC BLOOD PRESSURE: 138 MMHG | DIASTOLIC BLOOD PRESSURE: 78 MMHG | BODY MASS INDEX: 29.83 KG/M2 | HEIGHT: 61 IN | WEIGHT: 158 LBS

## 2023-10-16 DIAGNOSIS — N18.30 STAGE 3 CHRONIC KIDNEY DISEASE, UNSPECIFIED WHETHER STAGE 3A OR 3B CKD (HCC): ICD-10-CM

## 2023-10-16 DIAGNOSIS — Z23 NEED FOR VACCINATION: ICD-10-CM

## 2023-10-16 DIAGNOSIS — E11.51 TYPE 2 DIABETES MELLITUS WITH PERIPHERAL VASCULAR DISEASE (HCC): Primary | ICD-10-CM

## 2023-10-16 DIAGNOSIS — M81.0 AGE-RELATED OSTEOPOROSIS WITHOUT CURRENT PATHOLOGICAL FRACTURE: ICD-10-CM

## 2023-10-16 DIAGNOSIS — I73.9 PERIPHERAL VASCULAR DISEASE, UNSPECIFIED (HCC): ICD-10-CM

## 2023-10-16 DIAGNOSIS — E78.5 HYPERLIPIDEMIA, UNSPECIFIED HYPERLIPIDEMIA TYPE: ICD-10-CM

## 2023-10-16 DIAGNOSIS — Z98.890 H/O CAROTID ENDARTERECTOMY: ICD-10-CM

## 2023-10-16 DIAGNOSIS — F11.99 OPIOID USE, UNSPECIFIED WITH UNSPECIFIED OPIOID-INDUCED DISORDER (HCC): ICD-10-CM

## 2023-10-16 PROCEDURE — G8417 CALC BMI ABV UP PARAM F/U: HCPCS | Performed by: INTERNAL MEDICINE

## 2023-10-16 PROCEDURE — G8484 FLU IMMUNIZE NO ADMIN: HCPCS | Performed by: INTERNAL MEDICINE

## 2023-10-16 PROCEDURE — 1036F TOBACCO NON-USER: CPT | Performed by: INTERNAL MEDICINE

## 2023-10-16 PROCEDURE — 3044F HG A1C LEVEL LT 7.0%: CPT | Performed by: INTERNAL MEDICINE

## 2023-10-16 PROCEDURE — G8399 PT W/DXA RESULTS DOCUMENT: HCPCS | Performed by: INTERNAL MEDICINE

## 2023-10-16 PROCEDURE — 99214 OFFICE O/P EST MOD 30 MIN: CPT | Performed by: INTERNAL MEDICINE

## 2023-10-16 PROCEDURE — G8427 DOCREV CUR MEDS BY ELIG CLIN: HCPCS | Performed by: INTERNAL MEDICINE

## 2023-10-16 PROCEDURE — 1090F PRES/ABSN URINE INCON ASSESS: CPT | Performed by: INTERNAL MEDICINE

## 2023-10-16 PROCEDURE — 1123F ACP DISCUSS/DSCN MKR DOCD: CPT | Performed by: INTERNAL MEDICINE

## 2023-10-16 PROCEDURE — 3078F DIAST BP <80 MM HG: CPT | Performed by: INTERNAL MEDICINE

## 2023-10-16 PROCEDURE — 3075F SYST BP GE 130 - 139MM HG: CPT | Performed by: INTERNAL MEDICINE

## 2023-10-16 RX ORDER — ATORVASTATIN CALCIUM 10 MG/1
10 TABLET, FILM COATED ORAL EVERY EVENING
Qty: 90 TABLET | Refills: 3 | Status: SHIPPED | OUTPATIENT
Start: 2023-10-16

## 2023-10-16 NOTE — PROGRESS NOTES
without change  Lipids ok  Resume Prolia for 2 years. Posoris in last BMD  Creatinine stable      Return in about 4 months (around 2/16/2024) for [facility] and then Ext lab visit 1 week later.

## 2023-10-20 DIAGNOSIS — M81.0 AGE-RELATED OSTEOPOROSIS WITHOUT CURRENT PATHOLOGICAL FRACTURE: Primary | ICD-10-CM

## 2023-10-20 RX ORDER — ACETAMINOPHEN 325 MG/1
650 TABLET ORAL
OUTPATIENT
Start: 2023-10-20

## 2023-10-20 RX ORDER — ALBUTEROL SULFATE 90 UG/1
4 AEROSOL, METERED RESPIRATORY (INHALATION) PRN
OUTPATIENT
Start: 2023-10-20

## 2023-10-20 RX ORDER — EPINEPHRINE 1 MG/ML
0.3 INJECTION, SOLUTION, CONCENTRATE INTRAVENOUS PRN
OUTPATIENT
Start: 2023-10-20

## 2023-10-20 RX ORDER — DIPHENHYDRAMINE HYDROCHLORIDE 50 MG/ML
50 INJECTION INTRAMUSCULAR; INTRAVENOUS
OUTPATIENT
Start: 2023-10-20

## 2023-10-20 RX ORDER — ONDANSETRON 2 MG/ML
8 INJECTION INTRAMUSCULAR; INTRAVENOUS
OUTPATIENT
Start: 2023-10-20

## 2023-10-20 RX ORDER — SODIUM CHLORIDE 9 MG/ML
INJECTION, SOLUTION INTRAVENOUS CONTINUOUS
OUTPATIENT
Start: 2023-10-20

## 2023-10-20 RX ORDER — FAMOTIDINE 10 MG/ML
20 INJECTION, SOLUTION INTRAVENOUS
OUTPATIENT
Start: 2023-10-20

## 2023-11-07 ENCOUNTER — OFFICE VISIT (OUTPATIENT)
Dept: VASCULAR SURGERY | Age: 83
End: 2023-11-07
Payer: MEDICARE

## 2023-11-07 VITALS
OXYGEN SATURATION: 91 % | HEART RATE: 106 BPM | DIASTOLIC BLOOD PRESSURE: 81 MMHG | WEIGHT: 159.7 LBS | BODY MASS INDEX: 30.18 KG/M2 | SYSTOLIC BLOOD PRESSURE: 164 MMHG

## 2023-11-07 DIAGNOSIS — I73.9 PVD (PERIPHERAL VASCULAR DISEASE) WITH CLAUDICATION (HCC): Primary | ICD-10-CM

## 2023-11-07 PROCEDURE — 1090F PRES/ABSN URINE INCON ASSESS: CPT | Performed by: SURGERY

## 2023-11-07 PROCEDURE — G8484 FLU IMMUNIZE NO ADMIN: HCPCS | Performed by: SURGERY

## 2023-11-07 PROCEDURE — 1036F TOBACCO NON-USER: CPT | Performed by: SURGERY

## 2023-11-07 PROCEDURE — G8399 PT W/DXA RESULTS DOCUMENT: HCPCS | Performed by: SURGERY

## 2023-11-07 PROCEDURE — 99213 OFFICE O/P EST LOW 20 MIN: CPT | Performed by: SURGERY

## 2023-11-07 PROCEDURE — 3077F SYST BP >= 140 MM HG: CPT | Performed by: SURGERY

## 2023-11-07 PROCEDURE — G8427 DOCREV CUR MEDS BY ELIG CLIN: HCPCS | Performed by: SURGERY

## 2023-11-07 PROCEDURE — 3079F DIAST BP 80-89 MM HG: CPT | Performed by: SURGERY

## 2023-11-07 PROCEDURE — 1123F ACP DISCUSS/DSCN MKR DOCD: CPT | Performed by: SURGERY

## 2023-11-07 PROCEDURE — G8417 CALC BMI ABV UP PARAM F/U: HCPCS | Performed by: SURGERY

## 2023-11-07 NOTE — PROGRESS NOTES
[address]   [address] FAX: [phone] Siddiqui  : 1940    Chief Complaint:     History of Present Illness:   Patient follows up today for follow-up carotid duplex study. Patient has a well-known left carotid stenosis greater than 70% is asymptomatic and we will continue to watch. She is aware that the lesion is high at the base of C1 and potentially would be a carotid stent candidate. However patient is asymptomatic and does not want any surgery at this time. CURRENT MEDICATIONS:  Current Outpatient Medications   Medication Sig Dispense Refill    atorvastatin (LIPITOR) 10 MG tablet Take 1 tablet by mouth every evening 90 tablet 3    denosumab (PROLIA) 60 MG/ML SOSY SC injection Inject 1 mL into the skin every 6 months 1 mL 1    lisinopril (PRINIVIL;ZESTRIL) 20 MG tablet Take 1 tablet by mouth 2 times daily 180 tablet 3    sertraline (ZOLOFT) 50 MG tablet Take 1 tablet by mouth daily 90 tablet 3    levothyroxine (SYNTHROID) 88 MCG tablet Take 1/2 tab on  and one all other days Indications: a condition with low thyroid hormone levels 90 tablet 3    Multiple Vitamin (MULTI-VITAMINS PO) Take 1 tablet by mouth daily      aspirin 81 MG EC tablet Take 1 tablet by mouth 2 times daily      Calcium Carbonate-Vitamin D (CALCIUM-VITAMIN D) 600-125 MG-UNIT TABS Take 1 tablet by mouth Daily with lunch      coenzyme Q10 100 MG CAPS capsule Take 1 capsule by mouth Daily with lunch      meclizine (ANTIVERT) 25 MG tablet Take 1 tablet by mouth 3 times daily as needed      polyethylene glycol (GLYCOLAX) 17 GM/SCOOP powder Take 17 g by mouth daily as needed       No current facility-administered medications for this visit.        Past Medical History:   Diagnosis Date    Carotid artery stenosis 10/1/2014    Depression 2016    Hyperlipidemia     Insomnia 2016    Irritable bowel syndrome 2016    Osteoarthritis 2016    Osteoporosis

## 2024-02-14 SDOH — HEALTH STABILITY: PHYSICAL HEALTH: ON AVERAGE, HOW MANY DAYS PER WEEK DO YOU ENGAGE IN MODERATE TO STRENUOUS EXERCISE (LIKE A BRISK WALK)?: 2 DAYS

## 2024-02-14 SDOH — HEALTH STABILITY: PHYSICAL HEALTH: ON AVERAGE, HOW MANY MINUTES DO YOU ENGAGE IN EXERCISE AT THIS LEVEL?: 10 MIN

## 2024-02-14 ASSESSMENT — LIFESTYLE VARIABLES
HOW MANY STANDARD DRINKS CONTAINING ALCOHOL DO YOU HAVE ON A TYPICAL DAY: 0
HOW OFTEN DO YOU HAVE A DRINK CONTAINING ALCOHOL: NEVER
HOW MANY STANDARD DRINKS CONTAINING ALCOHOL DO YOU HAVE ON A TYPICAL DAY: PATIENT DOES NOT DRINK
HOW OFTEN DO YOU HAVE SIX OR MORE DRINKS ON ONE OCCASION: 1
HOW OFTEN DO YOU HAVE A DRINK CONTAINING ALCOHOL: 1

## 2024-02-14 ASSESSMENT — PATIENT HEALTH QUESTIONNAIRE - PHQ9
SUM OF ALL RESPONSES TO PHQ9 QUESTIONS 1 & 2: 0
SUM OF ALL RESPONSES TO PHQ QUESTIONS 1-9: 0
1. LITTLE INTEREST OR PLEASURE IN DOING THINGS: 0
SUM OF ALL RESPONSES TO PHQ QUESTIONS 1-9: 0
SUM OF ALL RESPONSES TO PHQ QUESTIONS 1-9: 0
2. FEELING DOWN, DEPRESSED OR HOPELESS: 0
SUM OF ALL RESPONSES TO PHQ QUESTIONS 1-9: 0

## 2024-02-22 ENCOUNTER — OFFICE VISIT (OUTPATIENT)
Dept: INTERNAL MEDICINE CLINIC | Facility: CLINIC | Age: 84
End: 2024-02-22

## 2024-02-22 VITALS
HEART RATE: 99 BPM | BODY MASS INDEX: 29.83 KG/M2 | DIASTOLIC BLOOD PRESSURE: 100 MMHG | WEIGHT: 158 LBS | SYSTOLIC BLOOD PRESSURE: 150 MMHG | HEIGHT: 61 IN | OXYGEN SATURATION: 95 %

## 2024-02-22 DIAGNOSIS — E78.5 HYPERLIPIDEMIA, UNSPECIFIED HYPERLIPIDEMIA TYPE: ICD-10-CM

## 2024-02-22 DIAGNOSIS — Z78.9 STATIN INTOLERANCE: ICD-10-CM

## 2024-02-22 DIAGNOSIS — I10 PRIMARY HYPERTENSION: ICD-10-CM

## 2024-02-22 DIAGNOSIS — N18.30 STAGE 3 CHRONIC KIDNEY DISEASE, UNSPECIFIED WHETHER STAGE 3A OR 3B CKD (HCC): ICD-10-CM

## 2024-02-22 DIAGNOSIS — M81.0 AGE-RELATED OSTEOPOROSIS WITHOUT CURRENT PATHOLOGICAL FRACTURE: ICD-10-CM

## 2024-02-22 DIAGNOSIS — E11.51 TYPE 2 DIABETES MELLITUS WITH PERIPHERAL VASCULAR DISEASE (HCC): ICD-10-CM

## 2024-02-22 DIAGNOSIS — Z00.00 MEDICARE ANNUAL WELLNESS VISIT, SUBSEQUENT: Primary | ICD-10-CM

## 2024-02-22 DIAGNOSIS — Z12.31 ENCOUNTER FOR SCREENING MAMMOGRAM FOR MALIGNANT NEOPLASM OF BREAST: ICD-10-CM

## 2024-02-22 LAB
ALBUMIN SERPL-MCNC: 3.8 G/DL (ref 3.2–4.6)
ALBUMIN/GLOB SERPL: 1 (ref 0.4–1.6)
ALP SERPL-CCNC: 121 U/L (ref 50–136)
ALT SERPL-CCNC: 23 U/L (ref 12–65)
ANION GAP SERPL CALC-SCNC: 4 MMOL/L (ref 2–11)
AST SERPL-CCNC: 18 U/L (ref 15–37)
BACTERIA URNS QL MICRO: NEGATIVE /HPF
BASOPHILS # BLD: 0.1 K/UL (ref 0–0.2)
BASOPHILS NFR BLD: 1 % (ref 0–2)
BILIRUB SERPL-MCNC: 0.6 MG/DL (ref 0.2–1.1)
BUN SERPL-MCNC: 16 MG/DL (ref 8–23)
CALCIUM SERPL-MCNC: 9.7 MG/DL (ref 8.3–10.4)
CASTS URNS QL MICRO: ABNORMAL /LPF (ref 0–2)
CHLORIDE SERPL-SCNC: 104 MMOL/L (ref 103–113)
CO2 SERPL-SCNC: 32 MMOL/L (ref 21–32)
CREAT SERPL-MCNC: 0.9 MG/DL (ref 0.6–1)
CREAT UR-MCNC: 87 MG/DL
CREAT UR-MCNC: 90 MG/DL
DIFFERENTIAL METHOD BLD: ABNORMAL
EOSINOPHIL # BLD: 0.2 K/UL (ref 0–0.8)
EOSINOPHIL NFR BLD: 2 % (ref 0.5–7.8)
EPI CELLS #/AREA URNS HPF: ABNORMAL /HPF (ref 0–5)
ERYTHROCYTE [DISTWIDTH] IN BLOOD BY AUTOMATED COUNT: 12.6 % (ref 11.9–14.6)
GLOBULIN SER CALC-MCNC: 3.8 G/DL (ref 2.8–4.5)
GLUCOSE SERPL-MCNC: 113 MG/DL (ref 65–100)
HCT VFR BLD AUTO: 48.6 % (ref 35.8–46.3)
HGB BLD-MCNC: 15.5 G/DL (ref 11.7–15.4)
IMM GRANULOCYTES # BLD AUTO: 0 K/UL (ref 0–0.5)
IMM GRANULOCYTES NFR BLD AUTO: 0 % (ref 0–5)
LYMPHOCYTES # BLD: 2.8 K/UL (ref 0.5–4.6)
LYMPHOCYTES NFR BLD: 26 % (ref 13–44)
MCH RBC QN AUTO: 29.8 PG (ref 26.1–32.9)
MCHC RBC AUTO-ENTMCNC: 31.9 G/DL (ref 31.4–35)
MCV RBC AUTO: 93.3 FL (ref 82–102)
MICROALBUMIN UR-MCNC: 0.88 MG/DL
MICROALBUMIN/CREAT UR-RTO: 10 MG/G (ref 0–30)
MONOCYTES # BLD: 0.9 K/UL (ref 0.1–1.3)
MONOCYTES NFR BLD: 9 % (ref 4–12)
NEUTS SEG # BLD: 6.5 K/UL (ref 1.7–8.2)
NEUTS SEG NFR BLD: 62 % (ref 43–78)
NRBC # BLD: 0 K/UL (ref 0–0.2)
PLATELET # BLD AUTO: 307 K/UL (ref 150–450)
PMV BLD AUTO: 10.4 FL (ref 9.4–12.3)
POTASSIUM SERPL-SCNC: 4 MMOL/L (ref 3.5–5.1)
PROT SERPL-MCNC: 7.6 G/DL (ref 6.3–8.2)
PROT UR-MCNC: 11 MG/DL
PROT/CREAT UR-RTO: 0.1
RBC # BLD AUTO: 5.21 M/UL (ref 4.05–5.2)
RBC #/AREA URNS HPF: ABNORMAL /HPF (ref 0–5)
SODIUM SERPL-SCNC: 140 MMOL/L (ref 136–146)
TSH, 3RD GENERATION: 0.93 UIU/ML (ref 0.36–3.74)
WBC # BLD AUTO: 10.5 K/UL (ref 4.3–11.1)
WBC URNS QL MICRO: ABNORMAL /HPF (ref 0–4)

## 2024-02-22 SDOH — ECONOMIC STABILITY: INCOME INSECURITY: HOW HARD IS IT FOR YOU TO PAY FOR THE VERY BASICS LIKE FOOD, HOUSING, MEDICAL CARE, AND HEATING?: NOT HARD AT ALL

## 2024-02-22 SDOH — ECONOMIC STABILITY: FOOD INSECURITY: WITHIN THE PAST 12 MONTHS, YOU WORRIED THAT YOUR FOOD WOULD RUN OUT BEFORE YOU GOT MONEY TO BUY MORE.: NEVER TRUE

## 2024-02-22 SDOH — ECONOMIC STABILITY: FOOD INSECURITY: WITHIN THE PAST 12 MONTHS, THE FOOD YOU BOUGHT JUST DIDN'T LAST AND YOU DIDN'T HAVE MONEY TO GET MORE.: NEVER TRUE

## 2024-02-22 NOTE — PROGRESS NOTES
Medicare Annual Wellness Visit    Violet Siddiqui is here for Medicare AWV    Assessment & Plan   Medicare annual wellness visit, subsequent  Type 2 diabetes mellitus with peripheral vascular disease (HCC)  -     Microalbumin / Creatinine Urine Ratio; Future  -     Protein / creatinine ratio, urine; Future  -     Hemoglobin A1C; Future  Opioid use, unspecified with unspecified opioid-induced disorder (HCC)  Stage 3 chronic kidney disease, unspecified whether stage 3a or 3b CKD (HCC)  -     CBC with Auto Differential; Future  Primary hypertension  -     Urinalysis, Micro; Future  Age-related osteoporosis without current pathological fracture  Hyperlipidemia, unspecified hyperlipidemia type  -     Comprehensive Metabolic Panel; Future  -     TSH; Future  Statin intolerance  Encounter for screening mammogram for malignant neoplasm of breast  -     THU PRIYA DIGITAL SCREEN BILATERAL; Future  Recommendations for Preventive Services Due: see orders and patient instructions/AVS.  Recommended screening schedule for the next 5-10 years is provided to the patient in written form: see Patient Instructions/AVS.   Discussed BMI and healthy weight and diet, weight bearing exercise, smoking avoidance, sun protection and medication compliance. Reviewed appropriate health maintenance screening. The patient was counseled regarding regular monthly SBE, screening procedures and recommended schedules for immunizations, mammography, Pap smears, GI hemoccult testing, colonoscopy and BMD.  Order for mammogram.DEXA due 2025. Considering flu and RSV vaccines -discussed. ACP on file.   Will need new orders for Prolia at [facility] and will discuss at follow up with Dr Lopez      Return for Scheduled with Dr Lopez.     Subjective       Patient's complete Health Risk Assessment and screening values have been reviewed and are found in Flowsheets. The following problems were reviewed today and where indicated follow up appointments were

## 2024-02-22 NOTE — PATIENT INSTRUCTIONS
Wait for an ambulance. Do not try to drive yourself.  Watch closely for changes in your health, and be sure to contact your doctor if you have any problems.  Where can you learn more?  Go to https://www."[employer].net/patientEd and enter F075 to learn more about \"A Healthy Heart: Care Instructions.\"  Current as of: June 25, 2023               Content Version: 13.9  © 3881-8590 [employer].   Care instructions adapted under license by [employer]. If you have questions about a medical condition or this instruction, always ask your healthcare professional. [employer] disclaims any warranty or liability for your use of this information.      Personalized Preventive Plan for Violet Siddiqui - 2/22/2024  Medicare offers a range of preventive health benefits. Some of the tests and screenings are paid in full while other may be subject to a deductible, co-insurance, and/or copay.    Some of these benefits include a comprehensive review of your medical history including lifestyle, illnesses that may run in your family, and various assessments and screenings as appropriate.    After reviewing your medical record and screening and assessments performed today your provider may have ordered immunizations, labs, imaging, and/or referrals for you.  A list of these orders (if applicable) as well as your Preventive Care list are included within your After Visit Summary for your review.    Other Preventive Recommendations:    A preventive eye exam performed by an eye specialist is recommended every 1-2 years to screen for glaucoma; cataracts, macular degeneration, and other eye disorders.  A preventive dental visit is recommended every 6 months.  Try to get at least 150 minutes of exercise per week or 10,000 steps per day on a pedometer .  Order or download the FREE \"Exercise & Physical Activity: Your Everyday Guide\" from [employer]. Call 1-553.619.2494 or search The National

## 2024-02-23 LAB
EST. AVERAGE GLUCOSE BLD GHB EST-MCNC: 123 MG/DL
HBA1C MFR BLD: 5.9 % (ref 4.8–5.6)

## 2024-02-24 PROBLEM — Z00.00 ROUTINE PHYSICAL EXAMINATION: Status: ACTIVE | Noted: 2024-02-24

## 2024-02-29 ENCOUNTER — OFFICE VISIT (OUTPATIENT)
Dept: INTERNAL MEDICINE CLINIC | Facility: CLINIC | Age: 84
End: 2024-02-29

## 2024-02-29 VITALS
DIASTOLIC BLOOD PRESSURE: 82 MMHG | WEIGHT: 156 LBS | SYSTOLIC BLOOD PRESSURE: 126 MMHG | BODY MASS INDEX: 29.45 KG/M2 | HEIGHT: 61 IN

## 2024-02-29 DIAGNOSIS — M54.30 SCIATICA, UNSPECIFIED LATERALITY: ICD-10-CM

## 2024-02-29 DIAGNOSIS — M19.91 PRIMARY OSTEOARTHRITIS, UNSPECIFIED SITE: ICD-10-CM

## 2024-02-29 DIAGNOSIS — G45.9 TIA (TRANSIENT ISCHEMIC ATTACK): ICD-10-CM

## 2024-02-29 DIAGNOSIS — I10 PRIMARY HYPERTENSION: ICD-10-CM

## 2024-02-29 DIAGNOSIS — M81.0 AGE-RELATED OSTEOPOROSIS WITHOUT CURRENT PATHOLOGICAL FRACTURE: ICD-10-CM

## 2024-02-29 DIAGNOSIS — E03.9 HYPOTHYROIDISM, UNSPECIFIED TYPE: Primary | ICD-10-CM

## 2024-02-29 DIAGNOSIS — Z98.890 H/O CAROTID ENDARTERECTOMY: ICD-10-CM

## 2024-02-29 DIAGNOSIS — Z23 NEED FOR VACCINATION: ICD-10-CM

## 2024-02-29 DIAGNOSIS — E11.51 TYPE 2 DIABETES MELLITUS WITH PERIPHERAL VASCULAR DISEASE (HCC): ICD-10-CM

## 2024-02-29 DIAGNOSIS — I73.9 PERIPHERAL VASCULAR DISEASE, UNSPECIFIED (HCC): ICD-10-CM

## 2024-02-29 DIAGNOSIS — H61.21 IMPACTED CERUMEN OF RIGHT EAR: ICD-10-CM

## 2024-02-29 DIAGNOSIS — E78.5 HYPERLIPIDEMIA, UNSPECIFIED HYPERLIPIDEMIA TYPE: ICD-10-CM

## 2024-02-29 ASSESSMENT — ENCOUNTER SYMPTOMS: SHORTNESS OF BREATH: 0

## 2024-02-29 NOTE — PROGRESS NOTES
She checks her BP at home and usually well controlled   She sees vascular for her known high grade carotid stenosis and is asymptomatic. He felt she might be a candidate for a carotid stent due to the high location of the blockage.    Hypertension  This is a chronic problem. The problem is unchanged. The problem is controlled. Pertinent negatives include no palpitations or shortness of breath. Risk factors for coronary artery disease include dyslipidemia. Past treatments include ACE inhibitors. There are no compliance problems.  Hypertensive end-organ damage includes PVD.       Past Medical History, Past Surgical History, Family history, Social History, and Medications were all reviewed with the patient today and updated as necessary.       Current Outpatient Medications   Medication Sig Dispense Refill    atorvastatin (LIPITOR) 10 MG tablet Take 1 tablet by mouth every evening 90 tablet 3    lisinopril (PRINIVIL;ZESTRIL) 20 MG tablet Take 1 tablet by mouth 2 times daily 180 tablet 3    sertraline (ZOLOFT) 50 MG tablet Take 1 tablet by mouth daily 90 tablet 3    levothyroxine (SYNTHROID) 88 MCG tablet Take 1/2 tab on Sunday and one all other days Indications: a condition with low thyroid hormone levels 90 tablet 3    Multiple Vitamin (MULTI-VITAMINS PO) Take 1 tablet by mouth daily      aspirin 81 MG EC tablet Take 1 tablet by mouth 2 times daily      Calcium Carbonate-Vitamin D (CALCIUM-VITAMIN D) 600-125 MG-UNIT TABS Take 1 tablet by mouth Daily with lunch      coenzyme Q10 100 MG CAPS capsule Take 1 capsule by mouth Daily with lunch      meclizine (ANTIVERT) 25 MG tablet Take 1 tablet by mouth 3 times daily as needed      polyethylene glycol (GLYCOLAX) 17 GM/SCOOP powder Take 17 g by mouth daily as needed      denosumab (PROLIA) 60 MG/ML SOSY SC injection Inject 1 mL into the skin every 6 months (Patient not taking: Reported on 2/22/2024) 1 mL 1     No current facility-administered medications for this visit.

## 2024-03-05 DIAGNOSIS — M81.0 AGE-RELATED OSTEOPOROSIS WITHOUT CURRENT PATHOLOGICAL FRACTURE: Primary | ICD-10-CM

## 2024-03-07 ENCOUNTER — NURSE ONLY (OUTPATIENT)
Age: 84
End: 2024-03-07

## 2024-03-07 VITALS
SYSTOLIC BLOOD PRESSURE: 159 MMHG | OXYGEN SATURATION: 91 % | TEMPERATURE: 97.4 F | RESPIRATION RATE: 18 BRPM | HEART RATE: 99 BPM | DIASTOLIC BLOOD PRESSURE: 81 MMHG

## 2024-03-07 DIAGNOSIS — M81.0 AGE-RELATED OSTEOPOROSIS WITHOUT CURRENT PATHOLOGICAL FRACTURE: Primary | ICD-10-CM

## 2024-03-07 RX ORDER — EPINEPHRINE 1 MG/ML
0.3 INJECTION, SOLUTION, CONCENTRATE INTRAVENOUS PRN
OUTPATIENT
Start: 2024-09-01

## 2024-03-07 RX ORDER — DIPHENHYDRAMINE HYDROCHLORIDE 50 MG/ML
50 INJECTION INTRAMUSCULAR; INTRAVENOUS
Status: DISCONTINUED | OUTPATIENT
Start: 2024-03-07 | End: 2024-03-07 | Stop reason: HOSPADM

## 2024-03-07 RX ORDER — DIPHENHYDRAMINE HYDROCHLORIDE 50 MG/ML
50 INJECTION INTRAMUSCULAR; INTRAVENOUS
OUTPATIENT
Start: 2024-09-01

## 2024-03-07 RX ORDER — SODIUM CHLORIDE 9 MG/ML
INJECTION, SOLUTION INTRAVENOUS CONTINUOUS
Status: DISCONTINUED | OUTPATIENT
Start: 2024-03-07 | End: 2024-03-07 | Stop reason: HOSPADM

## 2024-03-07 RX ORDER — SODIUM CHLORIDE 9 MG/ML
INJECTION, SOLUTION INTRAVENOUS CONTINUOUS
OUTPATIENT
Start: 2024-09-01

## 2024-03-07 RX ORDER — ALBUTEROL SULFATE 90 UG/1
4 AEROSOL, METERED RESPIRATORY (INHALATION) PRN
Status: DISCONTINUED | OUTPATIENT
Start: 2024-03-07 | End: 2024-03-07 | Stop reason: HOSPADM

## 2024-03-07 RX ORDER — ONDANSETRON 2 MG/ML
8 INJECTION INTRAMUSCULAR; INTRAVENOUS
Status: DISCONTINUED | OUTPATIENT
Start: 2024-03-07 | End: 2024-03-07 | Stop reason: HOSPADM

## 2024-03-07 RX ORDER — ALBUTEROL SULFATE 90 UG/1
4 AEROSOL, METERED RESPIRATORY (INHALATION) PRN
OUTPATIENT
Start: 2024-09-01

## 2024-03-07 RX ORDER — ACETAMINOPHEN 325 MG/1
650 TABLET ORAL
Status: DISCONTINUED | OUTPATIENT
Start: 2024-03-07 | End: 2024-03-07 | Stop reason: HOSPADM

## 2024-03-07 RX ORDER — ONDANSETRON 2 MG/ML
8 INJECTION INTRAMUSCULAR; INTRAVENOUS
OUTPATIENT
Start: 2024-09-01

## 2024-03-07 RX ORDER — ACETAMINOPHEN 325 MG/1
650 TABLET ORAL
OUTPATIENT
Start: 2024-09-01

## 2024-03-07 RX ORDER — EPINEPHRINE 1 MG/ML
0.3 INJECTION, SOLUTION, CONCENTRATE INTRAVENOUS PRN
Status: DISCONTINUED | OUTPATIENT
Start: 2024-03-07 | End: 2024-03-07 | Stop reason: HOSPADM

## 2024-03-07 NOTE — PROGRESS NOTES
[facility]  [address], [address] [place] [address]  Office : [phone], Fax: [phone]         Osteoporosis pre-infusion/injection questionnaire:     1. In the past month, have you had or are you planning to have any dental surgery or major dental procedures?  No     2. Are you taking a calcium and vitamin D supplement?      3. When was your last osteoporosis treatment?  Over 1 year ago     4. In the last year, have you had any fractures? No        Patient arrived ambulatory to infusion suite today for a repeat / initial Prolia subcutaneous injection.  Pertinent labs drawn 02/22/2024 . Labs reviewed and are within medication administration parameters.  Prolia 60mg/ml injected SC into  right arm. Patient tolerated injection well.  Pt observed for 30 minutes post-injection without complications.  Advised patient to continue with calcium and vitamin D supplements post injection. Advised patient to call the ordering provider with any problems post injection.       Next Prolia appt scheduled prior to departure from infusion suite.     Pt ambulatory with steady gait out of infusion suite.

## 2024-03-25 PROBLEM — Z00.00 ROUTINE PHYSICAL EXAMINATION: Status: RESOLVED | Noted: 2024-02-24 | Resolved: 2024-03-25

## 2024-06-25 ENCOUNTER — OFFICE VISIT (OUTPATIENT)
Dept: VASCULAR SURGERY | Age: 84
End: 2024-06-25
Payer: MEDICARE

## 2024-06-25 VITALS
BODY MASS INDEX: 29.07 KG/M2 | WEIGHT: 154 LBS | HEIGHT: 61 IN | OXYGEN SATURATION: 93 % | SYSTOLIC BLOOD PRESSURE: 153 MMHG | HEART RATE: 94 BPM | DIASTOLIC BLOOD PRESSURE: 86 MMHG

## 2024-06-25 DIAGNOSIS — I73.9 PVD (PERIPHERAL VASCULAR DISEASE) WITH CLAUDICATION (HCC): Primary | ICD-10-CM

## 2024-06-25 PROCEDURE — G8427 DOCREV CUR MEDS BY ELIG CLIN: HCPCS | Performed by: SURGERY

## 2024-06-25 PROCEDURE — G8399 PT W/DXA RESULTS DOCUMENT: HCPCS | Performed by: SURGERY

## 2024-06-25 PROCEDURE — 1090F PRES/ABSN URINE INCON ASSESS: CPT | Performed by: SURGERY

## 2024-06-25 PROCEDURE — G8417 CALC BMI ABV UP PARAM F/U: HCPCS | Performed by: SURGERY

## 2024-06-25 PROCEDURE — 1123F ACP DISCUSS/DSCN MKR DOCD: CPT | Performed by: SURGERY

## 2024-06-25 PROCEDURE — 99213 OFFICE O/P EST LOW 20 MIN: CPT | Performed by: SURGERY

## 2024-06-25 PROCEDURE — 3078F DIAST BP <80 MM HG: CPT | Performed by: SURGERY

## 2024-06-25 PROCEDURE — 1036F TOBACCO NON-USER: CPT | Performed by: SURGERY

## 2024-06-25 PROCEDURE — 3077F SYST BP >= 140 MM HG: CPT | Performed by: SURGERY

## 2024-06-25 NOTE — PROGRESS NOTES
[address] [place] [id number]  [phone] FAX: [phone]    Violet Siddiqui  : 1940    Chief Complaint:     History of Present Illness:   Patient follows up today for follow-up duplex study.  Patient has a known asymptomatic left carotid stenosis greater than 70%.    CURRENT MEDICATIONS:  Current Outpatient Medications   Medication Sig Dispense Refill    atorvastatin (LIPITOR) 10 MG tablet Take 1 tablet by mouth every evening 90 tablet 3    lisinopril (PRINIVIL;ZESTRIL) 20 MG tablet Take 1 tablet by mouth 2 times daily 180 tablet 3    sertraline (ZOLOFT) 50 MG tablet Take 1 tablet by mouth daily 90 tablet 3    levothyroxine (SYNTHROID) 88 MCG tablet Take 1/2 tab on  and one all other days Indications: a condition with low thyroid hormone levels 90 tablet 3    Multiple Vitamin (MULTI-VITAMINS PO) Take 1 tablet by mouth daily      aspirin 81 MG EC tablet Take 1 tablet by mouth 2 times daily      Calcium Carbonate-Vitamin D (CALCIUM-VITAMIN D) 600-125 MG-UNIT TABS Take 1 tablet by mouth Daily with lunch      coenzyme Q10 100 MG CAPS capsule Take 1 capsule by mouth Daily with lunch      meclizine (ANTIVERT) 25 MG tablet Take 1 tablet by mouth 3 times daily as needed      polyethylene glycol (GLYCOLAX) 17 GM/SCOOP powder Take 17 g by mouth daily as needed      denosumab (PROLIA) 60 MG/ML SOSY SC injection Inject 1 mL into the skin once for 1 dose 180 mL 0     No current facility-administered medications for this visit.       Past Medical History:   Diagnosis Date    Carotid artery stenosis 10/1/2014    Depression 2016    Hyperlipidemia     Insomnia 2016    Irritable bowel syndrome 2016    Osteoarthritis 2016    Osteoporosis 2016    Peripheral artery insufficiency (HCC) 2016    Sciatica 2016    TIA (transient ischemic attack) 2016       Physical Examination:   Height: 1.549 m (5' 1\"), Weight - Scale: 69.9 kg (154 lb), BP: (!)

## 2024-08-14 DIAGNOSIS — F32.A DEPRESSION, UNSPECIFIED DEPRESSION TYPE: ICD-10-CM

## 2024-08-14 DIAGNOSIS — E03.9 ACQUIRED HYPOTHYROIDISM: ICD-10-CM

## 2024-08-14 DIAGNOSIS — I10 ESSENTIAL HYPERTENSION: ICD-10-CM

## 2024-08-14 RX ORDER — LEVOTHYROXINE SODIUM 88 UG/1
TABLET ORAL
Qty: 90 TABLET | Refills: 0 | Status: SHIPPED | OUTPATIENT
Start: 2024-08-14

## 2024-08-14 RX ORDER — LISINOPRIL 20 MG/1
20 TABLET ORAL 2 TIMES DAILY
Qty: 180 TABLET | Refills: 0 | Status: SHIPPED | OUTPATIENT
Start: 2024-08-14

## 2024-08-14 NOTE — TELEPHONE ENCOUNTER
Pt asking for refill     levothyroxine (SYNTHROID) 88 MCG tablet     lisinopril (PRINIVIL;ZESTRIL) 20 MG tablet       sertraline (ZOLOFT) 50 MG tablet       Send to     [id number] -   [place], [place] - [address] -   P [phone]     NOV 08/29/2024

## 2024-08-26 SDOH — HEALTH STABILITY: PHYSICAL HEALTH: ON AVERAGE, HOW MANY MINUTES DO YOU ENGAGE IN EXERCISE AT THIS LEVEL?: 20 MIN

## 2024-08-26 SDOH — HEALTH STABILITY: PHYSICAL HEALTH: ON AVERAGE, HOW MANY DAYS PER WEEK DO YOU ENGAGE IN MODERATE TO STRENUOUS EXERCISE (LIKE A BRISK WALK)?: 2 DAYS

## 2024-08-28 DIAGNOSIS — E11.51 TYPE 2 DIABETES MELLITUS WITH PERIPHERAL VASCULAR DISEASE (HCC): ICD-10-CM

## 2024-08-28 DIAGNOSIS — E03.9 HYPOTHYROIDISM, UNSPECIFIED TYPE: ICD-10-CM

## 2024-08-28 DIAGNOSIS — I10 PRIMARY HYPERTENSION: ICD-10-CM

## 2024-08-28 LAB
ANION GAP SERPL CALC-SCNC: 10 MMOL/L (ref 9–18)
BUN SERPL-MCNC: 18 MG/DL (ref 8–23)
CALCIUM SERPL-MCNC: 9.4 MG/DL (ref 8.8–10.2)
CHLORIDE SERPL-SCNC: 101 MMOL/L (ref 98–107)
CO2 SERPL-SCNC: 30 MMOL/L (ref 20–28)
CREAT SERPL-MCNC: 0.98 MG/DL (ref 0.6–1.1)
EST. AVERAGE GLUCOSE BLD GHB EST-MCNC: 138 MG/DL
GLUCOSE SERPL-MCNC: 133 MG/DL (ref 70–99)
HBA1C MFR BLD: 6.5 % (ref 0–5.6)
POTASSIUM SERPL-SCNC: 5.2 MMOL/L (ref 3.5–5.1)
SODIUM SERPL-SCNC: 141 MMOL/L (ref 136–145)
TSH, 3RD GENERATION: 0.53 UIU/ML (ref 0.27–4.2)

## 2024-08-29 ENCOUNTER — OFFICE VISIT (OUTPATIENT)
Dept: INTERNAL MEDICINE CLINIC | Facility: CLINIC | Age: 84
End: 2024-08-29

## 2024-08-29 VITALS
HEART RATE: 100 BPM | WEIGHT: 154 LBS | DIASTOLIC BLOOD PRESSURE: 74 MMHG | SYSTOLIC BLOOD PRESSURE: 130 MMHG | BODY MASS INDEX: 29.07 KG/M2 | OXYGEN SATURATION: 93 % | HEIGHT: 61 IN

## 2024-08-29 DIAGNOSIS — Z86.73 HISTORY OF TIA (TRANSIENT ISCHEMIC ATTACK): ICD-10-CM

## 2024-08-29 DIAGNOSIS — F32.A DEPRESSION, UNSPECIFIED DEPRESSION TYPE: ICD-10-CM

## 2024-08-29 DIAGNOSIS — E11.51 TYPE 2 DIABETES MELLITUS WITH PERIPHERAL VASCULAR DISEASE (HCC): ICD-10-CM

## 2024-08-29 DIAGNOSIS — E78.5 HYPERLIPIDEMIA, UNSPECIFIED HYPERLIPIDEMIA TYPE: ICD-10-CM

## 2024-08-29 DIAGNOSIS — M81.0 AGE-RELATED OSTEOPOROSIS WITHOUT CURRENT PATHOLOGICAL FRACTURE: ICD-10-CM

## 2024-08-29 DIAGNOSIS — I10 ESSENTIAL HYPERTENSION: Primary | ICD-10-CM

## 2024-08-29 DIAGNOSIS — F11.99 OPIOID USE, UNSPECIFIED WITH UNSPECIFIED OPIOID-INDUCED DISORDER (HCC): ICD-10-CM

## 2024-08-29 DIAGNOSIS — I73.9 PERIPHERAL VASCULAR DISEASE, UNSPECIFIED (HCC): ICD-10-CM

## 2024-08-29 DIAGNOSIS — E03.9 ACQUIRED HYPOTHYROIDISM: ICD-10-CM

## 2024-08-29 DIAGNOSIS — M19.91 PRIMARY OSTEOARTHRITIS, UNSPECIFIED SITE: ICD-10-CM

## 2024-08-29 DIAGNOSIS — E87.5 HYPERKALEMIA: ICD-10-CM

## 2024-08-29 DIAGNOSIS — I10 PRIMARY HYPERTENSION: ICD-10-CM

## 2024-08-29 DIAGNOSIS — E55.9 VITAMIN D DEFICIENCY: ICD-10-CM

## 2024-08-29 PROBLEM — Z78.9 STATIN INTOLERANCE: Status: RESOLVED | Noted: 2022-03-14 | Resolved: 2024-08-29

## 2024-08-29 RX ORDER — ATORVASTATIN CALCIUM 10 MG/1
10 TABLET, FILM COATED ORAL EVERY EVENING
Qty: 90 TABLET | Refills: 3 | Status: SHIPPED | OUTPATIENT
Start: 2024-08-29

## 2024-08-29 RX ORDER — LISINOPRIL 20 MG/1
20 TABLET ORAL 2 TIMES DAILY
Qty: 180 TABLET | Refills: 3 | Status: SHIPPED | OUTPATIENT
Start: 2024-08-29

## 2024-08-29 RX ORDER — TRAMADOL HYDROCHLORIDE 50 MG/1
50 TABLET ORAL 2 TIMES DAILY PRN
Qty: 60 TABLET | Refills: 2 | Status: SHIPPED | OUTPATIENT
Start: 2024-08-29 | End: 2024-11-27

## 2024-08-29 RX ORDER — LEVOTHYROXINE SODIUM 88 UG/1
TABLET ORAL
Qty: 90 TABLET | Refills: 3 | Status: SHIPPED | OUTPATIENT
Start: 2024-08-29

## 2024-08-29 ASSESSMENT — PATIENT HEALTH QUESTIONNAIRE - PHQ9
4. FEELING TIRED OR HAVING LITTLE ENERGY: NEARLY EVERY DAY
8. MOVING OR SPEAKING SO SLOWLY THAT OTHER PEOPLE COULD HAVE NOTICED. OR THE OPPOSITE, BEING SO FIGETY OR RESTLESS THAT YOU HAVE BEEN MOVING AROUND A LOT MORE THAN USUAL: NEARLY EVERY DAY
SUM OF ALL RESPONSES TO PHQ9 QUESTIONS 1 & 2: 0
2. FEELING DOWN, DEPRESSED OR HOPELESS: NOT AT ALL
10. IF YOU CHECKED OFF ANY PROBLEMS, HOW DIFFICULT HAVE THESE PROBLEMS MADE IT FOR YOU TO DO YOUR WORK, TAKE CARE OF THINGS AT HOME, OR GET ALONG WITH OTHER PEOPLE: NOT DIFFICULT AT ALL
SUM OF ALL RESPONSES TO PHQ QUESTIONS 1-9: 9
SUM OF ALL RESPONSES TO PHQ QUESTIONS 1-9: 9
5. POOR APPETITE OR OVEREATING: NOT AT ALL
3. TROUBLE FALLING OR STAYING ASLEEP: NEARLY EVERY DAY
9. THOUGHTS THAT YOU WOULD BE BETTER OFF DEAD, OR OF HURTING YOURSELF: NOT AT ALL
6. FEELING BAD ABOUT YOURSELF - OR THAT YOU ARE A FAILURE OR HAVE LET YOURSELF OR YOUR FAMILY DOWN: NOT AT ALL
SUM OF ALL RESPONSES TO PHQ QUESTIONS 1-9: 9
1. LITTLE INTEREST OR PLEASURE IN DOING THINGS: NOT AT ALL
SUM OF ALL RESPONSES TO PHQ QUESTIONS 1-9: 9
7. TROUBLE CONCENTRATING ON THINGS, SUCH AS READING THE NEWSPAPER OR WATCHING TELEVISION: NOT AT ALL

## 2024-08-29 NOTE — PROGRESS NOTES
SUBJECTIVE:   Violet Siddiqui is a 84 y.o. female seen for a visit regarding   Chief Complaint   Patient presents with    Establish Care     Transfer from Dr Lopez    Arthritis     Discuss the difference in types of arthritis     Cholesterol Problem    Hypertension    Thyroid Problem    Medication Refill        HPI:     Prior PCP retired this is the first time seeing her today    On labs, K was 5.2, not eating much potassium rich food    DM2  HTN: checks at home, 120/70s, on lisinopril BID  HLD  Hypothyroidism  Arthritis, tramadol use: since hip replacement 10 yrs ago. Pain in both shoulders, ongoing R hip pain, knees. Thumb pain. Takes tramadol sometimes, will use tylenol. Declines ortho referral.  Osteoporosis: takes Ca, vit D, was on prolia but stopped with COVID, getting back on this  CKD 3  Anxiety: on sertraline      Past Medical History, Past Surgical History, Family history, Social History, and Medications were all reviewed with the patient today and updated as necessary.     Current Outpatient Medications   Medication Sig Dispense Refill    atorvastatin (LIPITOR) 10 MG tablet Take 1 tablet by mouth every evening 90 tablet 3    levothyroxine (SYNTHROID) 88 MCG tablet Take 1/2 tab on Sunday and one all other days Indications: a condition with low thyroid hormone levels 90 tablet 3    sertraline (ZOLOFT) 50 MG tablet Take 1 tablet by mouth daily 90 tablet 3    lisinopril (PRINIVIL;ZESTRIL) 20 MG tablet Take 1 tablet by mouth 2 times daily 180 tablet 3    traMADol (ULTRAM) 50 MG tablet Take 1 tablet by mouth 2 times daily as needed for Pain for up to 90 days. Intended supply: 7 days. Take lowest dose possible to manage pain Max Daily Amount: 100 mg 60 tablet 2    Multiple Vitamin (MULTI-VITAMINS PO) Take 1 tablet by mouth daily      aspirin 81 MG EC tablet Take 1 tablet by mouth 2 times daily      Calcium Carbonate-Vitamin D (CALCIUM-VITAMIN D) 600-125 MG-UNIT TABS Take 1 tablet by mouth Daily with lunch

## 2024-08-29 NOTE — ASSESSMENT & PLAN NOTE
intermittently will use tramadol, we discussed potential side effects today increased risk of falls etc. and should minimize use

## 2024-08-30 NOTE — ASSESSMENT & PLAN NOTE
she does not want to decrease lisinopril use other medication, discussed this can raise potassium and I recommend she decrease potassium in her diet and can recheck in 3 weeks

## 2024-09-04 DIAGNOSIS — M81.0 AGE-RELATED OSTEOPOROSIS WITHOUT CURRENT PATHOLOGICAL FRACTURE: ICD-10-CM

## 2024-09-04 LAB
ALBUMIN SERPL-MCNC: 3.8 G/DL (ref 3.2–4.6)
ALBUMIN/GLOB SERPL: 1 (ref 1–1.9)
ALP SERPL-CCNC: 104 U/L (ref 35–104)
ALT SERPL-CCNC: 23 U/L (ref 12–65)
ANION GAP SERPL CALC-SCNC: 9 MMOL/L (ref 9–18)
AST SERPL-CCNC: 24 U/L (ref 15–37)
BILIRUB SERPL-MCNC: 0.4 MG/DL (ref 0–1.2)
BUN SERPL-MCNC: 21 MG/DL (ref 8–23)
CALCIUM SERPL-MCNC: 10.3 MG/DL (ref 8.8–10.2)
CHLORIDE SERPL-SCNC: 100 MMOL/L (ref 98–107)
CO2 SERPL-SCNC: 32 MMOL/L (ref 20–28)
CREAT SERPL-MCNC: 0.95 MG/DL (ref 0.6–1.1)
GLOBULIN SER CALC-MCNC: 3.7 G/DL (ref 2.3–3.5)
GLUCOSE SERPL-MCNC: 116 MG/DL (ref 70–99)
PHOSPHATE SERPL-MCNC: 3.7 MG/DL (ref 2.5–4.5)
POTASSIUM SERPL-SCNC: 4.6 MMOL/L (ref 3.5–5.1)
PROT SERPL-MCNC: 7.5 G/DL (ref 6.3–8.2)
SODIUM SERPL-SCNC: 140 MMOL/L (ref 136–145)

## 2024-09-05 ENCOUNTER — TELEPHONE (OUTPATIENT)
Dept: INTERNAL MEDICINE CLINIC | Facility: CLINIC | Age: 84
End: 2024-09-05

## 2024-09-05 NOTE — TELEPHONE ENCOUNTER
Patient notified Potassium is now normal range, good. Verbalized understanding  
Regular Solids with Thin Liquids per patient

## 2024-09-11 ENCOUNTER — NURSE ONLY (OUTPATIENT)
Age: 84
End: 2024-09-11

## 2024-09-11 VITALS
SYSTOLIC BLOOD PRESSURE: 139 MMHG | DIASTOLIC BLOOD PRESSURE: 91 MMHG | HEART RATE: 95 BPM | RESPIRATION RATE: 16 BRPM | OXYGEN SATURATION: 100 % | TEMPERATURE: 97.4 F

## 2024-09-11 DIAGNOSIS — M81.0 AGE-RELATED OSTEOPOROSIS WITHOUT CURRENT PATHOLOGICAL FRACTURE: Primary | ICD-10-CM

## 2024-09-11 RX ORDER — ONDANSETRON 2 MG/ML
8 INJECTION INTRAMUSCULAR; INTRAVENOUS
OUTPATIENT
Start: 2025-03-05

## 2024-09-11 RX ORDER — ACETAMINOPHEN 325 MG/1
650 TABLET ORAL
OUTPATIENT
Start: 2025-03-05

## 2024-09-11 RX ORDER — SODIUM CHLORIDE 9 MG/ML
INJECTION, SOLUTION INTRAVENOUS CONTINUOUS
OUTPATIENT
Start: 2025-03-05

## 2024-09-11 RX ORDER — DIPHENHYDRAMINE HYDROCHLORIDE 50 MG/ML
50 INJECTION INTRAMUSCULAR; INTRAVENOUS
OUTPATIENT
Start: 2025-03-05

## 2024-09-11 RX ORDER — ALBUTEROL SULFATE 90 UG/1
4 AEROSOL, METERED RESPIRATORY (INHALATION) PRN
OUTPATIENT
Start: 2025-03-05

## 2024-09-11 RX ORDER — EPINEPHRINE 1 MG/ML
0.3 INJECTION, SOLUTION, CONCENTRATE INTRAVENOUS PRN
OUTPATIENT
Start: 2025-03-05

## 2024-09-19 DIAGNOSIS — E87.5 HYPERKALEMIA: ICD-10-CM

## 2024-09-19 DIAGNOSIS — E55.9 VITAMIN D DEFICIENCY: ICD-10-CM

## 2024-09-19 LAB
25(OH)D3 SERPL-MCNC: 39.1 NG/ML (ref 30–100)
ANION GAP SERPL CALC-SCNC: 9 MMOL/L (ref 9–18)
BUN SERPL-MCNC: 20 MG/DL (ref 8–23)
CALCIUM SERPL-MCNC: 9.7 MG/DL (ref 8.8–10.2)
CHLORIDE SERPL-SCNC: 98 MMOL/L (ref 98–107)
CO2 SERPL-SCNC: 31 MMOL/L (ref 20–28)
CREAT SERPL-MCNC: 0.96 MG/DL (ref 0.6–1.1)
GLUCOSE SERPL-MCNC: 106 MG/DL (ref 70–99)
POTASSIUM SERPL-SCNC: 5.1 MMOL/L (ref 3.5–5.1)
SODIUM SERPL-SCNC: 138 MMOL/L (ref 136–145)

## 2024-11-08 DIAGNOSIS — E03.9 ACQUIRED HYPOTHYROIDISM: ICD-10-CM

## 2024-11-08 RX ORDER — LEVOTHYROXINE SODIUM 88 UG/1
TABLET ORAL
Qty: 90 TABLET | Refills: 3 | OUTPATIENT
Start: 2024-11-08

## 2025-01-01 ENCOUNTER — APPOINTMENT (OUTPATIENT)
Dept: ULTRASOUND IMAGING | Age: 85
DRG: 853 | End: 2025-01-01
Payer: MEDICARE

## 2025-01-01 ENCOUNTER — TELEPHONE (OUTPATIENT)
Dept: PULMONOLOGY | Age: 85
End: 2025-01-01

## 2025-01-01 ENCOUNTER — APPOINTMENT (OUTPATIENT)
Dept: GENERAL RADIOLOGY | Age: 85
DRG: 853 | End: 2025-01-01
Payer: MEDICARE

## 2025-01-01 ENCOUNTER — APPOINTMENT (OUTPATIENT)
Dept: NON INVASIVE DIAGNOSTICS | Age: 85
DRG: 853 | End: 2025-01-01
Payer: MEDICARE

## 2025-01-01 ENCOUNTER — RESULTS FOLLOW-UP (OUTPATIENT)
Dept: PULMONOLOGY | Age: 85
End: 2025-01-01

## 2025-01-01 ENCOUNTER — APPOINTMENT (OUTPATIENT)
Dept: CARDIAC CATH/INVASIVE PROCEDURES | Age: 85
DRG: 853 | End: 2025-01-01
Attending: INTERNAL MEDICINE
Payer: MEDICARE

## 2025-01-01 ENCOUNTER — RESULTS FOLLOW-UP (OUTPATIENT)
Dept: SLEEP MEDICINE | Age: 85
End: 2025-01-01

## 2025-01-01 ENCOUNTER — RESULTS FOLLOW-UP (OUTPATIENT)
Dept: INTERNAL MEDICINE CLINIC | Facility: CLINIC | Age: 85
End: 2025-01-01

## 2025-01-01 ENCOUNTER — HOSPITAL ENCOUNTER (INPATIENT)
Age: 85
LOS: 9 days | Discharge: HOME | DRG: 853 | End: 2025-04-17
Attending: EMERGENCY MEDICINE | Admitting: STUDENT IN AN ORGANIZED HEALTH CARE EDUCATION/TRAINING PROGRAM
Payer: MEDICARE

## 2025-01-01 ENCOUNTER — TELEPHONE (OUTPATIENT)
Dept: INTERNAL MEDICINE CLINIC | Facility: CLINIC | Age: 85
End: 2025-01-01

## 2025-01-01 ENCOUNTER — PREP FOR PROCEDURE (OUTPATIENT)
Dept: GASTROENTEROLOGY | Age: 85
End: 2025-01-01

## 2025-01-01 VITALS
TEMPERATURE: 97.7 F | WEIGHT: 141 LBS | OXYGEN SATURATION: 94 % | SYSTOLIC BLOOD PRESSURE: 108 MMHG | RESPIRATION RATE: 18 BRPM | HEIGHT: 60 IN | HEART RATE: 87 BPM | BODY MASS INDEX: 27.68 KG/M2 | DIASTOLIC BLOOD PRESSURE: 80 MMHG

## 2025-01-01 DIAGNOSIS — C85.91 LYMPHOMA OF LYMPH NODES OF NECK, UNSPECIFIED LYMPHOMA TYPE (HCC): ICD-10-CM

## 2025-01-01 DIAGNOSIS — Z09 HOSPITAL DISCHARGE FOLLOW-UP: Primary | ICD-10-CM

## 2025-01-01 DIAGNOSIS — R09.02 HYPOXIA: Primary | ICD-10-CM

## 2025-01-01 DIAGNOSIS — M79.601 PAIN OF RIGHT ARM: ICD-10-CM

## 2025-01-01 DIAGNOSIS — I10 ESSENTIAL HYPERTENSION: ICD-10-CM

## 2025-01-01 DIAGNOSIS — I48.91 ATRIAL FIBRILLATION, UNSPECIFIED TYPE (HCC): ICD-10-CM

## 2025-01-01 DIAGNOSIS — J96.01 ACUTE RESPIRATORY FAILURE WITH HYPOXIA (HCC): ICD-10-CM

## 2025-01-01 DIAGNOSIS — R91.8 LUNG MASS: ICD-10-CM

## 2025-01-01 DIAGNOSIS — E22.2 SIADH (SYNDROME OF INAPPROPRIATE ADH PRODUCTION): Primary | ICD-10-CM

## 2025-01-01 DIAGNOSIS — J90 PLEURAL EFFUSION, LEFT: ICD-10-CM

## 2025-01-01 DIAGNOSIS — J81.0 ACUTE PULMONARY EDEMA (HCC): ICD-10-CM

## 2025-01-01 DIAGNOSIS — R93.89 ABNORMAL CT SCAN: ICD-10-CM

## 2025-01-01 DIAGNOSIS — J90 PLEURAL EFFUSION: ICD-10-CM

## 2025-01-01 LAB
ALBUMIN SERPL-MCNC: 2.6 G/DL (ref 3.2–4.6)
ALBUMIN SERPL-MCNC: 2.7 G/DL (ref 3.2–4.6)
ALBUMIN SERPL-MCNC: 2.8 G/DL (ref 3.2–4.6)
ALBUMIN SERPL-MCNC: 3.1 G/DL (ref 3.2–4.6)
ALBUMIN/GLOB SERPL: 0.7 (ref 1–1.9)
ALBUMIN/GLOB SERPL: 0.8 (ref 1–1.9)
ALP SERPL-CCNC: 104 U/L (ref 35–104)
ALP SERPL-CCNC: 109 U/L (ref 35–104)
ALP SERPL-CCNC: 125 U/L (ref 35–104)
ALP SERPL-CCNC: 132 U/L (ref 35–104)
ALT SERPL-CCNC: 45 U/L (ref 8–45)
ALT SERPL-CCNC: 50 U/L (ref 8–45)
ALT SERPL-CCNC: 52 U/L (ref 8–45)
ALT SERPL-CCNC: 67 U/L (ref 8–45)
ANION GAP SERPL CALC-SCNC: 10 MMOL/L (ref 7–16)
ANION GAP SERPL CALC-SCNC: 11 MMOL/L (ref 7–16)
ANION GAP SERPL CALC-SCNC: 8 MMOL/L (ref 7–16)
ANION GAP SERPL CALC-SCNC: 9 MMOL/L (ref 7–16)
APPEARANCE FLD: NORMAL
APPEARANCE UR: CLEAR
APTT PPP: 24.2 SEC (ref 23.3–37.4)
AST SERPL-CCNC: 56 U/L (ref 15–37)
AST SERPL-CCNC: 87 U/L (ref 15–37)
AST SERPL-CCNC: 88 U/L (ref 15–37)
AST SERPL-CCNC: 91 U/L (ref 15–37)
B2 MICROGLOB SERPL-MCNC: 2.8 MG/L (ref 0.6–2.4)
BACTERIA SPEC CULT: NORMAL
BACTERIA SPEC CULT: NORMAL
BACTERIA URNS QL MICRO: 0 /HPF
BASOPHILS # BLD: 0.05 K/UL (ref 0–0.2)
BASOPHILS # BLD: 0.08 K/UL (ref 0–0.2)
BASOPHILS # BLD: 0.08 K/UL (ref 0–0.2)
BASOPHILS # BLD: 0.09 K/UL (ref 0–0.2)
BASOPHILS # BLD: 0.11 K/UL (ref 0–0.2)
BASOPHILS NFR BLD: 0.2 % (ref 0–2)
BASOPHILS NFR BLD: 0.3 % (ref 0–2)
BASOPHILS NFR BLD: 0.4 % (ref 0–2)
BASOPHILS NFR BLD: 0.4 % (ref 0–2)
BASOPHILS NFR BLD: 0.5 % (ref 0–2)
BASOPHILS NFR BLD: 0.8 % (ref 0–2)
BILIRUB SERPL-MCNC: 0.3 MG/DL (ref 0–1.2)
BILIRUB SERPL-MCNC: 0.4 MG/DL (ref 0–1.2)
BILIRUB SERPL-MCNC: 0.4 MG/DL (ref 0–1.2)
BILIRUB SERPL-MCNC: 0.6 MG/DL (ref 0–1.2)
BILIRUB UR QL: NEGATIVE
BUN SERPL-MCNC: 103 MG/DL (ref 8–23)
BUN SERPL-MCNC: 28 MG/DL (ref 8–23)
BUN SERPL-MCNC: 37 MG/DL (ref 8–23)
BUN SERPL-MCNC: 50 MG/DL (ref 8–23)
BUN SERPL-MCNC: 55 MG/DL (ref 8–23)
BUN SERPL-MCNC: 57 MG/DL (ref 8–23)
BUN SERPL-MCNC: 60 MG/DL (ref 8–23)
BUN SERPL-MCNC: 62 MG/DL (ref 8–23)
BUN SERPL-MCNC: 68 MG/DL (ref 8–23)
BUN SERPL-MCNC: 87 MG/DL (ref 8–23)
CALCIUM SERPL-MCNC: 10.2 MG/DL (ref 8.8–10.2)
CALCIUM SERPL-MCNC: 10.3 MG/DL (ref 8.8–10.2)
CALCIUM SERPL-MCNC: 8.6 MG/DL (ref 8.8–10.2)
CALCIUM SERPL-MCNC: 8.8 MG/DL (ref 8.8–10.2)
CALCIUM SERPL-MCNC: 9.3 MG/DL (ref 8.8–10.2)
CALCIUM SERPL-MCNC: 9.4 MG/DL (ref 8.8–10.2)
CALCIUM SERPL-MCNC: 9.6 MG/DL (ref 8.8–10.2)
CALCIUM SERPL-MCNC: 9.6 MG/DL (ref 8.8–10.2)
CALCIUM SERPL-MCNC: 9.7 MG/DL (ref 8.8–10.2)
CALCIUM SERPL-MCNC: 9.9 MG/DL (ref 8.8–10.2)
CASTS URNS QL MICRO: 0 /LPF
CHLORIDE SERPL-SCNC: 101 MMOL/L (ref 98–107)
CHLORIDE SERPL-SCNC: 102 MMOL/L (ref 98–107)
CHLORIDE SERPL-SCNC: 89 MMOL/L (ref 98–107)
CHLORIDE SERPL-SCNC: 90 MMOL/L (ref 98–107)
CHLORIDE SERPL-SCNC: 91 MMOL/L (ref 98–107)
CHLORIDE SERPL-SCNC: 91 MMOL/L (ref 98–107)
CHLORIDE SERPL-SCNC: 92 MMOL/L (ref 98–107)
CHLORIDE SERPL-SCNC: 93 MMOL/L (ref 98–107)
CHLORIDE SERPL-SCNC: 95 MMOL/L (ref 98–107)
CHLORIDE SERPL-SCNC: 96 MMOL/L (ref 98–107)
CHOLESTEROL, TOTAL: 23 MG/DL
CO2 SERPL-SCNC: 26 MMOL/L (ref 20–29)
CO2 SERPL-SCNC: 30 MMOL/L (ref 20–29)
CO2 SERPL-SCNC: 30 MMOL/L (ref 20–29)
CO2 SERPL-SCNC: 31 MMOL/L (ref 20–29)
CO2 SERPL-SCNC: 31 MMOL/L (ref 20–29)
CO2 SERPL-SCNC: 32 MMOL/L (ref 20–29)
CO2 SERPL-SCNC: 37 MMOL/L (ref 20–29)
COLOR FLD: YELLOW
COLOR UR: ABNORMAL
CREAT SERPL-MCNC: 0.7 MG/DL (ref 0.6–1.1)
CREAT SERPL-MCNC: 0.77 MG/DL (ref 0.6–1.1)
CREAT SERPL-MCNC: 0.81 MG/DL (ref 0.6–1.1)
CREAT SERPL-MCNC: 0.82 MG/DL (ref 0.6–1.1)
CREAT SERPL-MCNC: 0.84 MG/DL (ref 0.6–1.1)
CREAT SERPL-MCNC: 0.84 MG/DL (ref 0.6–1.1)
CREAT SERPL-MCNC: 0.87 MG/DL (ref 0.6–1.1)
CREAT SERPL-MCNC: 0.88 MG/DL (ref 0.6–1.1)
CREAT SERPL-MCNC: 0.9 MG/DL (ref 0.6–1.1)
CREAT SERPL-MCNC: 1.04 MG/DL (ref 0.6–1.1)
CRYSTALS URNS QL MICRO: 0 /LPF
DIFFERENTIAL METHOD BLD: ABNORMAL
ECHO AO ASC DIAM: 2.5 CM
ECHO AO ASCENDING AORTA INDEX: 1.52 CM/M2
ECHO AO ROOT DIAM: 2.6 CM
ECHO AO ROOT INDEX: 1.58 CM/M2
ECHO AV AREA PEAK VELOCITY: 2.2 CM2
ECHO AV AREA VTI: 2.4 CM2
ECHO AV AREA/BSA PEAK VELOCITY: 1.3 CM2/M2
ECHO AV AREA/BSA VTI: 1.5 CM2/M2
ECHO AV MEAN GRADIENT: 4 MMHG
ECHO AV MEAN GRADIENT: 4 MMHG
ECHO AV MEAN VELOCITY: 1 M/S
ECHO AV PEAK GRADIENT: 9 MMHG
ECHO AV PEAK GRADIENT: 9 MMHG
ECHO AV PEAK VELOCITY: 1.5 M/S
ECHO AV VELOCITY RATIO: 0.73
ECHO AV VTI: 29.3 CM
ECHO BSA: 1.7 M2
ECHO IVC PROX: 1.8 CM
ECHO LA AREA 2C: 12 CM2
ECHO LA AREA 4C: 11.8 CM2
ECHO LA DIAMETER INDEX: 1.52 CM/M2
ECHO LA DIAMETER: 2.5 CM
ECHO LA MAJOR AXIS: 4.7 CM
ECHO LA MINOR AXIS: 5.1 CM
ECHO LA TO AORTIC ROOT RATIO: 0.96
ECHO LA VOL BP: 24 ML (ref 22–52)
ECHO LA VOL MOD A2C: 23 ML (ref 22–52)
ECHO LA VOL MOD A4C: 24 ML (ref 22–52)
ECHO LA VOL/BSA BIPLANE: 15 ML/M2 (ref 16–34)
ECHO LA VOLUME INDEX MOD A2C: 14 ML/M2 (ref 16–34)
ECHO LA VOLUME INDEX MOD A4C: 15 ML/M2 (ref 16–34)
ECHO LV E' LATERAL VELOCITY: 6.96 CM/S
ECHO LV E' SEPTAL VELOCITY: 6.85 CM/S
ECHO LV EDV A2C: 48 ML
ECHO LV EDV A4C: 65 ML
ECHO LV EDV INDEX A4C: 39 ML/M2
ECHO LV EDV NDEX A2C: 29 ML/M2
ECHO LV EJECTION FRACTION 3D: 63 %
ECHO LV EJECTION FRACTION A2C: 57 %
ECHO LV EJECTION FRACTION A4C: 66 %
ECHO LV EJECTION FRACTION BIPLANE: 63 % (ref 55–100)
ECHO LV ESV A2C: 21 ML
ECHO LV ESV A4C: 22 ML
ECHO LV ESV INDEX A2C: 13 ML/M2
ECHO LV ESV INDEX A4C: 13 ML/M2
ECHO LV FRACTIONAL SHORTENING: 33 % (ref 28–44)
ECHO LV INTERNAL DIMENSION DIASTOLE INDEX: 2.73 CM/M2
ECHO LV INTERNAL DIMENSION DIASTOLIC: 4.5 CM (ref 3.9–5.3)
ECHO LV INTERNAL DIMENSION SYSTOLIC INDEX: 1.82 CM/M2
ECHO LV INTERNAL DIMENSION SYSTOLIC: 3 CM
ECHO LV IVSD: 0.6 CM (ref 0.6–0.9)
ECHO LV MASS 2D: 70.9 G (ref 67–162)
ECHO LV MASS INDEX 2D: 43 G/M2 (ref 43–95)
ECHO LV POSTERIOR WALL DIASTOLIC: 0.5 CM (ref 0.6–0.9)
ECHO LV RELATIVE WALL THICKNESS RATIO: 0.22
ECHO LVOT AREA: 3.1 CM2
ECHO LVOT AV VTI INDEX: 0.75
ECHO LVOT DIAM: 2 CM
ECHO LVOT MEAN GRADIENT: 2 MMHG
ECHO LVOT PEAK GRADIENT: 4 MMHG
ECHO LVOT PEAK VELOCITY: 1.1 M/S
ECHO LVOT STROKE VOLUME INDEX: 41.9 ML/M2
ECHO LVOT SV: 69.1 ML
ECHO LVOT VTI: 22 CM
ECHO MV A VELOCITY: 1.21 M/S
ECHO MV AREA VTI: 2.5 CM2
ECHO MV E DECELERATION TIME (DT): 240 MS
ECHO MV E VELOCITY: 0.99 M/S
ECHO MV E/A RATIO: 0.82
ECHO MV E/E' LATERAL: 14.22
ECHO MV E/E' RATIO (AVERAGED): 14.34
ECHO MV E/E' SEPTAL: 14.45
ECHO MV LVOT VTI INDEX: 1.24
ECHO MV MAX VELOCITY: 1.1 M/S
ECHO MV MEAN GRADIENT: 2 MMHG
ECHO MV MEAN VELOCITY: 0.7 M/S
ECHO MV PEAK GRADIENT: 5 MMHG
ECHO MV VTI: 27.3 CM
ECHO PULMONARY ARTERY END DIASTOLIC PRESSURE: 5 MMHG
ECHO PV ACCELERATION TIME (AT): 74 MS
ECHO PV MAX VELOCITY: 1.2 M/S
ECHO PV PEAK GRADIENT: 6 MMHG
ECHO PV PEAK GRADIENT: 6 MMHG
ECHO PV REGURGITANT END DIASTOLIC MAX VELOCITY: 1.1 M/S
ECHO RV BASAL DIMENSION: 2.7 CM
ECHO RV LONGITUDINAL DIMENSION: 5.5 CM
ECHO RV MID DIMENSION: 1.8 CM
ECHO RV TAPSE: 2.5 CM (ref 1.7–?)
EKG ATRIAL RATE: 103 BPM
EKG ATRIAL RATE: 156 BPM
EKG ATRIAL RATE: 85 BPM
EKG DIAGNOSIS: NORMAL
EKG P AXIS: 60 DEGREES
EKG P AXIS: 74 DEGREES
EKG P-R INTERVAL: 162 MS
EKG P-R INTERVAL: 167 MS
EKG Q-T INTERVAL: 283 MS
EKG Q-T INTERVAL: 344 MS
EKG Q-T INTERVAL: 374 MS
EKG QRS DURATION: 57 MS
EKG QRS DURATION: 66 MS
EKG QRS DURATION: 81 MS
EKG QTC CALCULATION (BAZETT): 445 MS
EKG QTC CALCULATION (BAZETT): 449 MS
EKG QTC CALCULATION (BAZETT): 458 MS
EKG R AXIS: 53 DEGREES
EKG R AXIS: 65 DEGREES
EKG R AXIS: 90 DEGREES
EKG T AXIS: 28 DEGREES
EKG T AXIS: 63 DEGREES
EKG T AXIS: 65 DEGREES
EKG VENTRICULAR RATE: 104 BPM
EKG VENTRICULAR RATE: 157 BPM
EKG VENTRICULAR RATE: 85 BPM
EOSINOPHIL # BLD: 0.06 K/UL (ref 0–0.8)
EOSINOPHIL # BLD: 0.15 K/UL (ref 0–0.8)
EOSINOPHIL # BLD: 0.19 K/UL (ref 0–0.8)
EOSINOPHIL # BLD: 0.21 K/UL (ref 0–0.8)
EOSINOPHIL # BLD: 0.27 K/UL (ref 0–0.8)
EOSINOPHIL # BLD: 0.28 K/UL (ref 0–0.8)
EOSINOPHIL # BLD: 0.29 K/UL (ref 0–0.8)
EOSINOPHIL # BLD: 0.42 K/UL (ref 0–0.8)
EOSINOPHIL NFR BLD: 0.5 % (ref 0.5–7.8)
EOSINOPHIL NFR BLD: 0.7 % (ref 0.5–7.8)
EOSINOPHIL NFR BLD: 1.2 % (ref 0.5–7.8)
EOSINOPHIL NFR BLD: 1.4 % (ref 0.5–7.8)
EOSINOPHIL NFR BLD: 1.4 % (ref 0.5–7.8)
EOSINOPHIL NFR BLD: 1.5 % (ref 0.5–7.8)
EOSINOPHIL NFR BLD: 2.2 % (ref 0.5–7.8)
EOSINOPHIL NFR BLD: 2.6 % (ref 0.5–7.8)
EPI CELLS #/AREA URNS HPF: ABNORMAL /HPF
ERYTHROCYTE [DISTWIDTH] IN BLOOD BY AUTOMATED COUNT: 13.6 % (ref 11.9–14.6)
ERYTHROCYTE [DISTWIDTH] IN BLOOD BY AUTOMATED COUNT: 13.7 % (ref 11.9–14.6)
ERYTHROCYTE [DISTWIDTH] IN BLOOD BY AUTOMATED COUNT: 13.8 % (ref 11.9–14.6)
ERYTHROCYTE [DISTWIDTH] IN BLOOD BY AUTOMATED COUNT: 14.2 % (ref 11.9–14.6)
ERYTHROCYTE [DISTWIDTH] IN BLOOD BY AUTOMATED COUNT: 14.3 % (ref 11.9–14.6)
ERYTHROCYTE [DISTWIDTH] IN BLOOD BY AUTOMATED COUNT: 14.5 % (ref 11.9–14.6)
ERYTHROCYTE [DISTWIDTH] IN BLOOD BY AUTOMATED COUNT: 14.7 % (ref 11.9–14.6)
FLOW CYTOMETRY RESULTS: NORMAL
FLOW CYTOMETRY RESULTS: NORMAL
GLOBULIN SER CALC-MCNC: 3.6 G/DL (ref 2.3–3.5)
GLOBULIN SER CALC-MCNC: 3.8 G/DL (ref 2.3–3.5)
GLOBULIN SER CALC-MCNC: 3.9 G/DL (ref 2.3–3.5)
GLOBULIN SER CALC-MCNC: 4.4 G/DL (ref 2.3–3.5)
GLUCOSE BLD STRIP.AUTO-MCNC: 152 MG/DL (ref 65–100)
GLUCOSE FLD-MCNC: 135 MG/DL
GLUCOSE SERPL-MCNC: 102 MG/DL (ref 70–99)
GLUCOSE SERPL-MCNC: 108 MG/DL (ref 70–99)
GLUCOSE SERPL-MCNC: 125 MG/DL (ref 70–99)
GLUCOSE SERPL-MCNC: 128 MG/DL (ref 70–99)
GLUCOSE SERPL-MCNC: 133 MG/DL (ref 70–99)
GLUCOSE SERPL-MCNC: 135 MG/DL (ref 70–99)
GLUCOSE SERPL-MCNC: 145 MG/DL (ref 70–99)
GLUCOSE SERPL-MCNC: 152 MG/DL (ref 70–99)
GLUCOSE SERPL-MCNC: 166 MG/DL (ref 70–99)
GLUCOSE SERPL-MCNC: 169 MG/DL (ref 70–99)
GLUCOSE UR STRIP.AUTO-MCNC: NEGATIVE MG/DL
GRAM STN SPEC: NORMAL
GRAM STN SPEC: NORMAL
HAV IGM SER QL: NONREACTIVE
HBV CORE IGM SER QL: NONREACTIVE
HBV SURFACE AG SER QL: NONREACTIVE
HCG SERPL QL: NEGATIVE
HCT VFR BLD AUTO: 35 % (ref 35.8–46.3)
HCT VFR BLD AUTO: 35.8 % (ref 35.8–46.3)
HCT VFR BLD AUTO: 38.5 % (ref 35.8–46.3)
HCT VFR BLD AUTO: 39.1 % (ref 35.8–46.3)
HCT VFR BLD AUTO: 39.3 % (ref 35.8–46.3)
HCT VFR BLD AUTO: 40 % (ref 35.8–46.3)
HCT VFR BLD AUTO: 40.2 % (ref 35.8–46.3)
HCT VFR BLD AUTO: 40.8 % (ref 35.8–46.3)
HCT VFR BLD AUTO: 47 % (ref 35.8–46.3)
HCV AB SER QL: NONREACTIVE
HGB BLD-MCNC: 11.8 G/DL (ref 11.7–15.4)
HGB BLD-MCNC: 11.8 G/DL (ref 11.7–15.4)
HGB BLD-MCNC: 12.6 G/DL (ref 11.7–15.4)
HGB BLD-MCNC: 12.6 G/DL (ref 11.7–15.4)
HGB BLD-MCNC: 12.7 G/DL (ref 11.7–15.4)
HGB BLD-MCNC: 12.7 G/DL (ref 11.7–15.4)
HGB BLD-MCNC: 12.8 G/DL (ref 11.7–15.4)
HGB BLD-MCNC: 13.4 G/DL (ref 11.7–15.4)
HGB BLD-MCNC: 14.8 G/DL (ref 11.7–15.4)
HGB UR QL STRIP: NEGATIVE
HIV 1+2 AB+HIV1 P24 AG SERPL QL IA: NONREACTIVE
HIV 1/2 RESULT COMMENT: NORMAL
IMM GRANULOCYTES # BLD AUTO: 0.14 K/UL (ref 0–0.5)
IMM GRANULOCYTES # BLD AUTO: 0.16 K/UL (ref 0–0.5)
IMM GRANULOCYTES # BLD AUTO: 0.24 K/UL (ref 0–0.5)
IMM GRANULOCYTES # BLD AUTO: 0.32 K/UL (ref 0–0.5)
IMM GRANULOCYTES # BLD AUTO: 0.56 K/UL (ref 0–0.5)
IMM GRANULOCYTES # BLD AUTO: 0.65 K/UL (ref 0–0.5)
IMM GRANULOCYTES # BLD AUTO: 0.88 K/UL (ref 0–0.5)
IMM GRANULOCYTES # BLD AUTO: 1.01 K/UL (ref 0–0.5)
IMM GRANULOCYTES NFR BLD AUTO: 1.2 % (ref 0–5)
IMM GRANULOCYTES NFR BLD AUTO: 1.3 % (ref 0–5)
IMM GRANULOCYTES NFR BLD AUTO: 1.6 % (ref 0–5)
IMM GRANULOCYTES NFR BLD AUTO: 2.4 % (ref 0–5)
IMM GRANULOCYTES NFR BLD AUTO: 3 % (ref 0–5)
IMM GRANULOCYTES NFR BLD AUTO: 3.4 % (ref 0–5)
IMM GRANULOCYTES NFR BLD AUTO: 4.4 % (ref 0–5)
IMM GRANULOCYTES NFR BLD AUTO: 4.7 % (ref 0–5)
INR PPP: 1
KETONES UR QL STRIP.AUTO: NEGATIVE MG/DL
LACTATE SERPL-SCNC: 1.5 MMOL/L (ref 0.5–2)
LACTATE SERPL-SCNC: 2.6 MMOL/L (ref 0.5–2)
LDH FLD L TO P-CCNC: 94 U/L
LDH SERPL L TO P-CCNC: 524 U/L (ref 127–281)
LEUKOCYTE ESTERASE UR QL STRIP.AUTO: ABNORMAL
LYMPHOCYTES # BLD: 0.97 K/UL (ref 0.5–4.6)
LYMPHOCYTES # BLD: 0.99 K/UL (ref 0.5–4.6)
LYMPHOCYTES # BLD: 1.57 K/UL (ref 0.5–4.6)
LYMPHOCYTES # BLD: 1.66 K/UL (ref 0.5–4.6)
LYMPHOCYTES # BLD: 1.76 K/UL (ref 0.5–4.6)
LYMPHOCYTES # BLD: 1.93 K/UL (ref 0.5–4.6)
LYMPHOCYTES # BLD: 1.96 K/UL (ref 0.5–4.6)
LYMPHOCYTES # BLD: 2.14 K/UL (ref 0.5–4.6)
LYMPHOCYTES NFR BLD: 10.8 % (ref 13–44)
LYMPHOCYTES NFR BLD: 12.6 % (ref 13–44)
LYMPHOCYTES NFR BLD: 14 % (ref 13–44)
LYMPHOCYTES NFR BLD: 14.4 % (ref 13–44)
LYMPHOCYTES NFR BLD: 4.3 % (ref 13–44)
LYMPHOCYTES NFR BLD: 8.5 % (ref 13–44)
LYMPHOCYTES NFR BLD: 8.7 % (ref 13–44)
LYMPHOCYTES NFR BLD: 9.2 % (ref 13–44)
LYMPHOCYTES NFR BRONCH MANUAL: 96 %
MACROPHAGES NFR BRONCH MANUAL: 2 %
MAGNESIUM SERPL-MCNC: 2 MG/DL (ref 1.8–2.4)
MAGNESIUM SERPL-MCNC: 2.1 MG/DL (ref 1.8–2.4)
MAGNESIUM SERPL-MCNC: 2.4 MG/DL (ref 1.8–2.4)
MCH RBC QN AUTO: 30 PG (ref 26.1–32.9)
MCH RBC QN AUTO: 30.2 PG (ref 26.1–32.9)
MCH RBC QN AUTO: 30.3 PG (ref 26.1–32.9)
MCH RBC QN AUTO: 30.3 PG (ref 26.1–32.9)
MCH RBC QN AUTO: 30.4 PG (ref 26.1–32.9)
MCH RBC QN AUTO: 30.5 PG (ref 26.1–32.9)
MCH RBC QN AUTO: 30.5 PG (ref 26.1–32.9)
MCH RBC QN AUTO: 30.6 PG (ref 26.1–32.9)
MCH RBC QN AUTO: 30.7 PG (ref 26.1–32.9)
MCHC RBC AUTO-ENTMCNC: 31.5 G/DL (ref 31.4–35)
MCHC RBC AUTO-ENTMCNC: 31.5 G/DL (ref 31.4–35)
MCHC RBC AUTO-ENTMCNC: 31.6 G/DL (ref 31.4–35)
MCHC RBC AUTO-ENTMCNC: 32.3 G/DL (ref 31.4–35)
MCHC RBC AUTO-ENTMCNC: 32.7 G/DL (ref 31.4–35)
MCHC RBC AUTO-ENTMCNC: 32.7 G/DL (ref 31.4–35)
MCHC RBC AUTO-ENTMCNC: 32.8 G/DL (ref 31.4–35)
MCHC RBC AUTO-ENTMCNC: 33 G/DL (ref 31.4–35)
MCHC RBC AUTO-ENTMCNC: 33.7 G/DL (ref 31.4–35)
MCV RBC AUTO: 90.7 FL (ref 82–102)
MCV RBC AUTO: 91.3 FL (ref 82–102)
MCV RBC AUTO: 91.8 FL (ref 82–102)
MCV RBC AUTO: 92.9 FL (ref 82–102)
MCV RBC AUTO: 93.9 FL (ref 82–102)
MCV RBC AUTO: 94.5 FL (ref 82–102)
MCV RBC AUTO: 95.5 FL (ref 82–102)
MCV RBC AUTO: 96.2 FL (ref 82–102)
MCV RBC AUTO: 96.9 FL (ref 82–102)
MONOCYTES # BLD: 0.84 K/UL (ref 0.1–1.3)
MONOCYTES # BLD: 1.39 K/UL (ref 0.1–1.3)
MONOCYTES # BLD: 1.39 K/UL (ref 0.1–1.3)
MONOCYTES # BLD: 1.63 K/UL (ref 0.1–1.3)
MONOCYTES # BLD: 1.64 K/UL (ref 0.1–1.3)
MONOCYTES # BLD: 1.69 K/UL (ref 0.1–1.3)
MONOCYTES # BLD: 1.76 K/UL (ref 0.1–1.3)
MONOCYTES # BLD: 2.13 K/UL (ref 0.1–1.3)
MONOCYTES NFR BLD: 10 % (ref 4–12)
MONOCYTES NFR BLD: 10.3 % (ref 4–12)
MONOCYTES NFR BLD: 10.4 % (ref 4–12)
MONOCYTES NFR BLD: 10.6 % (ref 4–12)
MONOCYTES NFR BLD: 10.6 % (ref 4–12)
MONOCYTES NFR BLD: 7.1 % (ref 4–12)
MONOCYTES NFR BLD: 7.6 % (ref 4–12)
MONOCYTES NFR BLD: 9.5 % (ref 4–12)
MUCOUS THREADS URNS QL MICRO: 0 /LPF
NEUTROPHILS NFR BRONCH MANUAL: 2 %
NEUTS SEG # BLD: 11.06 K/UL (ref 1.7–8.2)
NEUTS SEG # BLD: 11.83 K/UL (ref 1.7–8.2)
NEUTS SEG # BLD: 13.96 K/UL (ref 1.7–8.2)
NEUTS SEG # BLD: 16.37 K/UL (ref 1.7–8.2)
NEUTS SEG # BLD: 18.98 K/UL (ref 1.7–8.2)
NEUTS SEG # BLD: 9.03 K/UL (ref 1.7–8.2)
NEUTS SEG # BLD: 9.33 K/UL (ref 1.7–8.2)
NEUTS SEG # BLD: 9.7 K/UL (ref 1.7–8.2)
NEUTS SEG NFR BLD: 69.8 % (ref 43–78)
NEUTS SEG NFR BLD: 72.1 % (ref 43–78)
NEUTS SEG NFR BLD: 72.4 % (ref 43–78)
NEUTS SEG NFR BLD: 73.8 % (ref 43–78)
NEUTS SEG NFR BLD: 75.3 % (ref 43–78)
NEUTS SEG NFR BLD: 76.7 % (ref 43–78)
NEUTS SEG NFR BLD: 81.4 % (ref 43–78)
NEUTS SEG NFR BLD: 82.8 % (ref 43–78)
NITRITE UR QL STRIP.AUTO: NEGATIVE
NRBC # BLD: 0 K/UL (ref 0–0.2)
NUC CELL # FLD: 996 /CU MM
OTHER OBSERVATIONS: ABNORMAL
PH UR STRIP: 6 (ref 5–9)
PLATELET # BLD AUTO: 320 K/UL (ref 150–450)
PLATELET # BLD AUTO: 335 K/UL (ref 150–450)
PLATELET # BLD AUTO: 350 K/UL (ref 150–450)
PLATELET # BLD AUTO: 366 K/UL (ref 150–450)
PLATELET # BLD AUTO: 386 K/UL (ref 150–450)
PLATELET # BLD AUTO: 395 K/UL (ref 150–450)
PLATELET # BLD AUTO: 397 K/UL (ref 150–450)
PLATELET # BLD AUTO: 409 K/UL (ref 150–450)
PLATELET # BLD AUTO: 421 K/UL (ref 150–450)
PMV BLD AUTO: 10.1 FL (ref 9.4–12.3)
PMV BLD AUTO: 10.2 FL (ref 9.4–12.3)
PMV BLD AUTO: 10.3 FL (ref 9.4–12.3)
PMV BLD AUTO: 10.7 FL (ref 9.4–12.3)
PMV BLD AUTO: 9.8 FL (ref 9.4–12.3)
PMV BLD AUTO: 9.8 FL (ref 9.4–12.3)
PMV BLD AUTO: 9.9 FL (ref 9.4–12.3)
POTASSIUM SERPL-SCNC: 3.5 MMOL/L (ref 3.5–5.1)
POTASSIUM SERPL-SCNC: 3.5 MMOL/L (ref 3.5–5.1)
POTASSIUM SERPL-SCNC: 3.7 MMOL/L (ref 3.5–5.1)
POTASSIUM SERPL-SCNC: 3.9 MMOL/L (ref 3.5–5.1)
POTASSIUM SERPL-SCNC: 3.9 MMOL/L (ref 3.5–5.1)
POTASSIUM SERPL-SCNC: 4.1 MMOL/L (ref 3.5–5.1)
POTASSIUM SERPL-SCNC: 4.5 MMOL/L (ref 3.5–5.1)
POTASSIUM SERPL-SCNC: 4.7 MMOL/L (ref 3.5–5.1)
PROCALCITONIN SERPL-MCNC: 0.14 NG/ML (ref 0–0.1)
PROT FLD-MCNC: 1.9 G/DL
PROT SERPL-MCNC: 6.3 G/DL (ref 6.3–8.2)
PROT SERPL-MCNC: 6.5 G/DL (ref 6.3–8.2)
PROT SERPL-MCNC: 6.6 G/DL (ref 6.3–8.2)
PROT SERPL-MCNC: 7.5 G/DL (ref 6.3–8.2)
PROT UR STRIP-MCNC: NEGATIVE MG/DL
PROTHROMBIN TIME: 14.4 SEC (ref 11.3–14.9)
RBC # BLD AUTO: 3.86 M/UL (ref 4.05–5.2)
RBC # BLD AUTO: 3.9 M/UL (ref 4.05–5.2)
RBC # BLD AUTO: 4.1 M/UL (ref 4.05–5.2)
RBC # BLD AUTO: 4.16 M/UL (ref 4.05–5.2)
RBC # BLD AUTO: 4.16 M/UL (ref 4.05–5.2)
RBC # BLD AUTO: 4.21 M/UL (ref 4.05–5.2)
RBC # BLD AUTO: 4.21 M/UL (ref 4.05–5.2)
RBC # BLD AUTO: 4.47 M/UL (ref 4.05–5.2)
RBC # BLD AUTO: 4.85 M/UL (ref 4.05–5.2)
RBC # FLD: 1000 /CU MM
RBC #/AREA URNS HPF: ABNORMAL /HPF
SERVICE CMNT-IMP: ABNORMAL
SERVICE CMNT-IMP: NORMAL
SERVICE CMNT-IMP: NORMAL
SODIUM SERPL-SCNC: 130 MMOL/L (ref 136–145)
SODIUM SERPL-SCNC: 130 MMOL/L (ref 136–145)
SODIUM SERPL-SCNC: 132 MMOL/L (ref 136–145)
SODIUM SERPL-SCNC: 133 MMOL/L (ref 136–145)
SODIUM SERPL-SCNC: 134 MMOL/L (ref 136–145)
SODIUM SERPL-SCNC: 135 MMOL/L (ref 136–145)
SODIUM SERPL-SCNC: 136 MMOL/L (ref 136–145)
SODIUM SERPL-SCNC: 136 MMOL/L (ref 136–145)
SODIUM SERPL-SCNC: 139 MMOL/L (ref 136–145)
SODIUM SERPL-SCNC: 143 MMOL/L (ref 136–145)
SP GR UR REFRACTOMETRY: 1.02 (ref 1–1.02)
SPECIMEN SOURCE FLD: NORMAL
SPECIMEN SOURCE: NORMAL
TEST ORDERED: NORMAL
TEST ORDERED: NORMAL
UFH PPP CHRO-ACNC: 0.16 IU/ML (ref 0.3–0.7)
UFH PPP CHRO-ACNC: 0.19 IU/ML (ref 0.3–0.7)
UFH PPP CHRO-ACNC: 0.27 IU/ML (ref 0.3–0.7)
UFH PPP CHRO-ACNC: 0.46 IU/ML (ref 0.3–0.7)
UFH PPP CHRO-ACNC: 0.46 IU/ML (ref 0.3–0.7)
UFH PPP CHRO-ACNC: 0.48 IU/ML (ref 0.3–0.7)
UFH PPP CHRO-ACNC: 0.5 IU/ML (ref 0.3–0.7)
UFH PPP CHRO-ACNC: 0.5 IU/ML (ref 0.3–0.7)
UFH PPP CHRO-ACNC: 0.51 IU/ML (ref 0.3–0.7)
UFH PPP CHRO-ACNC: 0.53 IU/ML (ref 0.3–0.7)
UFH PPP CHRO-ACNC: 0.6 IU/ML (ref 0.3–0.7)
UFH PPP CHRO-ACNC: 0.62 IU/ML (ref 0.3–0.7)
UFH PPP CHRO-ACNC: 0.62 IU/ML (ref 0.3–0.7)
UFH PPP CHRO-ACNC: 0.66 IU/ML (ref 0.3–0.7)
UFH PPP CHRO-ACNC: 0.68 IU/ML (ref 0.3–0.7)
UFH PPP CHRO-ACNC: 0.86 IU/ML (ref 0.3–0.7)
UFH PPP CHRO-ACNC: 0.9 IU/ML (ref 0.3–0.7)
UFH PPP CHRO-ACNC: <0.1 IU/ML (ref 0.3–0.7)
URINE CULTURE IF INDICATED: ABNORMAL
UROBILINOGEN UR QL STRIP.AUTO: 0.2 EU/DL (ref 0.2–1)
WBC # BLD AUTO: 11.1 K/UL (ref 4.3–11.1)
WBC # BLD AUTO: 13.2 K/UL (ref 4.3–11.1)
WBC # BLD AUTO: 13.4 K/UL (ref 4.3–11.1)
WBC # BLD AUTO: 15.3 K/UL (ref 4.3–11.1)
WBC # BLD AUTO: 16.3 K/UL (ref 4.3–11.1)
WBC # BLD AUTO: 18.5 K/UL (ref 4.3–11.1)
WBC # BLD AUTO: 18.8 K/UL (ref 4.3–11.1)
WBC # BLD AUTO: 21.3 K/UL (ref 4.3–11.1)
WBC # BLD AUTO: 22.9 K/UL (ref 4.3–11.1)
WBC URNS QL MICRO: ABNORMAL /HPF

## 2025-01-01 PROCEDURE — 99233 SBSQ HOSP IP/OBS HIGH 50: CPT | Performed by: INTERNAL MEDICINE

## 2025-01-01 PROCEDURE — 6370000000 HC RX 637 (ALT 250 FOR IP): Performed by: NURSE PRACTITIONER

## 2025-01-01 PROCEDURE — 6360000002 HC RX W HCPCS: Performed by: NURSE PRACTITIONER

## 2025-01-01 PROCEDURE — 2100000000 HC CCU R&B

## 2025-01-01 PROCEDURE — 1100000000 HC RM PRIVATE

## 2025-01-01 PROCEDURE — 6370000000 HC RX 637 (ALT 250 FOR IP): Performed by: INTERNAL MEDICINE

## 2025-01-01 PROCEDURE — 2500000003 HC RX 250 WO HCPCS: Performed by: STUDENT IN AN ORGANIZED HEALTH CARE EDUCATION/TRAINING PROGRAM

## 2025-01-01 PROCEDURE — 76882 US LMTD JT/FCL EVL NVASC XTR: CPT

## 2025-01-01 PROCEDURE — 6370000000 HC RX 637 (ALT 250 FOR IP)

## 2025-01-01 PROCEDURE — 2500000003 HC RX 250 WO HCPCS: Performed by: INTERNAL MEDICINE

## 2025-01-01 PROCEDURE — 6360000002 HC RX W HCPCS: Performed by: INTERNAL MEDICINE

## 2025-01-01 PROCEDURE — 94640 AIRWAY INHALATION TREATMENT: CPT

## 2025-01-01 PROCEDURE — 6370000000 HC RX 637 (ALT 250 FOR IP): Performed by: STUDENT IN AN ORGANIZED HEALTH CARE EDUCATION/TRAINING PROGRAM

## 2025-01-01 PROCEDURE — 80048 BASIC METABOLIC PNL TOTAL CA: CPT

## 2025-01-01 PROCEDURE — 6360000002 HC RX W HCPCS: Performed by: STUDENT IN AN ORGANIZED HEALTH CARE EDUCATION/TRAINING PROGRAM

## 2025-01-01 PROCEDURE — 99232 SBSQ HOSP IP/OBS MODERATE 35: CPT | Performed by: INTERNAL MEDICINE

## 2025-01-01 PROCEDURE — 85520 HEPARIN ASSAY: CPT

## 2025-01-01 PROCEDURE — 2709999900 HC NON-CHARGEABLE SUPPLY: Performed by: INTERNAL MEDICINE

## 2025-01-01 PROCEDURE — 82945 GLUCOSE OTHER FLUID: CPT

## 2025-01-01 PROCEDURE — 83615 LACTATE (LD) (LDH) ENZYME: CPT

## 2025-01-01 PROCEDURE — 85025 COMPLETE CBC W/AUTO DIFF WBC: CPT

## 2025-01-01 PROCEDURE — 2580000003 HC RX 258: Performed by: NURSE PRACTITIONER

## 2025-01-01 PROCEDURE — 3600000002 HC SURGERY LEVEL 2 BASE: Performed by: SURGERY

## 2025-01-01 PROCEDURE — 2140000000 HC CCU INTERMEDIATE R&B

## 2025-01-01 PROCEDURE — 36415 COLL VENOUS BLD VENIPUNCTURE: CPT

## 2025-01-01 PROCEDURE — 2580000003 HC RX 258

## 2025-01-01 PROCEDURE — 93306 TTE W/DOPPLER COMPLETE: CPT

## 2025-01-01 PROCEDURE — 99223 1ST HOSP IP/OBS HIGH 75: CPT | Performed by: INTERNAL MEDICINE

## 2025-01-01 PROCEDURE — 6360000002 HC RX W HCPCS: Performed by: CASE MANAGER/CARE COORDINATOR

## 2025-01-01 PROCEDURE — 32555 ASPIRATE PLEURA W/ IMAGING: CPT | Performed by: INTERNAL MEDICINE

## 2025-01-01 PROCEDURE — 83735 ASSAY OF MAGNESIUM: CPT

## 2025-01-01 PROCEDURE — 2700000000 HC OXYGEN THERAPY PER DAY

## 2025-01-01 PROCEDURE — 94761 N-INVAS EAR/PLS OXIMETRY MLT: CPT

## 2025-01-01 PROCEDURE — 2580000003 HC RX 258: Performed by: INTERNAL MEDICINE

## 2025-01-01 PROCEDURE — 93010 ELECTROCARDIOGRAM REPORT: CPT | Performed by: INTERNAL MEDICINE

## 2025-01-01 PROCEDURE — 1100000003 HC PRIVATE W/ TELEMETRY

## 2025-01-01 PROCEDURE — 88184 FLOWCYTOMETRY/ TC 1 MARKER: CPT

## 2025-01-01 PROCEDURE — 93005 ELECTROCARDIOGRAM TRACING: CPT

## 2025-01-01 PROCEDURE — 71045 X-RAY EXAM CHEST 1 VIEW: CPT

## 2025-01-01 PROCEDURE — 07B10ZX EXCISION OF RIGHT NECK LYMPHATIC, OPEN APPROACH, DIAGNOSTIC: ICD-10-PCS | Performed by: SURGERY

## 2025-01-01 PROCEDURE — 87389 HIV-1 AG W/HIV-1&-2 AB AG IA: CPT

## 2025-01-01 PROCEDURE — 99223 1ST HOSP IP/OBS HIGH 75: CPT | Performed by: SURGERY

## 2025-01-01 PROCEDURE — 3700000000 HC ANESTHESIA ATTENDED CARE: Performed by: SURGERY

## 2025-01-01 PROCEDURE — 80053 COMPREHEN METABOLIC PANEL: CPT

## 2025-01-01 PROCEDURE — 99222 1ST HOSP IP/OBS MODERATE 55: CPT | Performed by: INTERNAL MEDICINE

## 2025-01-01 PROCEDURE — 83605 ASSAY OF LACTIC ACID: CPT

## 2025-01-01 PROCEDURE — 76937 US GUIDE VASCULAR ACCESS: CPT

## 2025-01-01 PROCEDURE — 2500000003 HC RX 250 WO HCPCS: Performed by: SURGERY

## 2025-01-01 PROCEDURE — 93005 ELECTROCARDIOGRAM TRACING: CPT | Performed by: EMERGENCY MEDICINE

## 2025-01-01 PROCEDURE — 94660 CPAP INITIATION&MGMT: CPT

## 2025-01-01 PROCEDURE — 3609027000 HC THORACENTESIS W/ULTRASOUND: Performed by: INTERNAL MEDICINE

## 2025-01-01 PROCEDURE — 84145 PROCALCITONIN (PCT): CPT

## 2025-01-01 PROCEDURE — 87205 SMEAR GRAM STAIN: CPT

## 2025-01-01 PROCEDURE — 88185 FLOWCYTOMETRY/TC ADD-ON: CPT

## 2025-01-01 PROCEDURE — 93971 EXTREMITY STUDY: CPT

## 2025-01-01 PROCEDURE — 94760 N-INVAS EAR/PLS OXIMETRY 1: CPT

## 2025-01-01 PROCEDURE — 6360000002 HC RX W HCPCS

## 2025-01-01 PROCEDURE — 2580000003 HC RX 258: Performed by: STUDENT IN AN ORGANIZED HEALTH CARE EDUCATION/TRAINING PROGRAM

## 2025-01-01 PROCEDURE — 74018 RADEX ABDOMEN 1 VIEW: CPT

## 2025-01-01 PROCEDURE — 88112 CYTOPATH CELL ENHANCE TECH: CPT

## 2025-01-01 PROCEDURE — 85610 PROTHROMBIN TIME: CPT

## 2025-01-01 PROCEDURE — 94664 DEMO&/EVAL PT USE INHALER: CPT

## 2025-01-01 PROCEDURE — 38510 BIOPSY/REMOVAL LYMPH NODES: CPT | Performed by: SURGERY

## 2025-01-01 PROCEDURE — 87086 URINE CULTURE/COLONY COUNT: CPT

## 2025-01-01 PROCEDURE — 88305 TISSUE EXAM BY PATHOLOGIST: CPT

## 2025-01-01 PROCEDURE — 80074 ACUTE HEPATITIS PANEL: CPT

## 2025-01-01 PROCEDURE — 7100000000 HC PACU RECOVERY - FIRST 15 MIN: Performed by: SURGERY

## 2025-01-01 PROCEDURE — 88342 IMHCHEM/IMCYTCHM 1ST ANTB: CPT

## 2025-01-01 PROCEDURE — 2709999900 HC NON-CHARGEABLE SUPPLY: Performed by: SURGERY

## 2025-01-01 PROCEDURE — 93306 TTE W/DOPPLER COMPLETE: CPT | Performed by: INTERNAL MEDICINE

## 2025-01-01 PROCEDURE — 3700000001 HC ADD 15 MINUTES (ANESTHESIA): Performed by: SURGERY

## 2025-01-01 PROCEDURE — 85027 COMPLETE CBC AUTOMATED: CPT

## 2025-01-01 PROCEDURE — 82232 ASSAY OF BETA-2 PROTEIN: CPT

## 2025-01-01 PROCEDURE — 84703 CHORIONIC GONADOTROPIN ASSAY: CPT

## 2025-01-01 PROCEDURE — 7100000001 HC PACU RECOVERY - ADDTL 15 MIN: Performed by: SURGERY

## 2025-01-01 PROCEDURE — 85730 THROMBOPLASTIN TIME PARTIAL: CPT

## 2025-01-01 PROCEDURE — 89050 BODY FLUID CELL COUNT: CPT

## 2025-01-01 PROCEDURE — 2580000003 HC RX 258: Performed by: ANESTHESIOLOGY

## 2025-01-01 PROCEDURE — 3600000012 HC SURGERY LEVEL 2 ADDTL 15MIN: Performed by: SURGERY

## 2025-01-01 PROCEDURE — 84157 ASSAY OF PROTEIN OTHER: CPT

## 2025-01-01 PROCEDURE — 71046 X-RAY EXAM CHEST 2 VIEWS: CPT

## 2025-01-01 PROCEDURE — C1729 CATH, DRAINAGE: HCPCS | Performed by: INTERNAL MEDICINE

## 2025-01-01 PROCEDURE — 88360 TUMOR IMMUNOHISTOCHEM/MANUAL: CPT

## 2025-01-01 PROCEDURE — 99285 EMERGENCY DEPT VISIT HI MDM: CPT

## 2025-01-01 PROCEDURE — 84311 SPECTROPHOTOMETRY: CPT

## 2025-01-01 PROCEDURE — 87070 CULTURE OTHR SPECIMN AEROBIC: CPT

## 2025-01-01 PROCEDURE — 6370000000 HC RX 637 (ALT 250 FOR IP): Performed by: CASE MANAGER/CARE COORDINATOR

## 2025-01-01 PROCEDURE — 2580000003 HC RX 258: Performed by: CASE MANAGER/CARE COORDINATOR

## 2025-01-01 PROCEDURE — 93005 ELECTROCARDIOGRAM TRACING: CPT | Performed by: CASE MANAGER/CARE COORDINATOR

## 2025-01-01 PROCEDURE — 0W9B3ZZ DRAINAGE OF LEFT PLEURAL CAVITY, PERCUTANEOUS APPROACH: ICD-10-PCS | Performed by: INTERNAL MEDICINE

## 2025-01-01 PROCEDURE — 81001 URINALYSIS AUTO W/SCOPE: CPT

## 2025-01-01 PROCEDURE — 88341 IMHCHEM/IMCYTCHM EA ADD ANTB: CPT

## 2025-01-01 PROCEDURE — 82962 GLUCOSE BLOOD TEST: CPT

## 2025-01-01 RX ORDER — OXYCODONE HYDROCHLORIDE 5 MG/1
5 TABLET ORAL PRN
Status: DISCONTINUED | OUTPATIENT
Start: 2025-01-01 | End: 2025-01-01 | Stop reason: HOSPADM

## 2025-01-01 RX ORDER — ASPIRIN 81 MG/1
81 TABLET ORAL 2 TIMES DAILY
Status: DISCONTINUED | OUTPATIENT
Start: 2025-01-01 | End: 2025-01-01 | Stop reason: HOSPADM

## 2025-01-01 RX ORDER — SODIUM CHLORIDE 0.9 % (FLUSH) 0.9 %
5-40 SYRINGE (ML) INJECTION PRN
Status: DISCONTINUED | OUTPATIENT
Start: 2025-01-01 | End: 2025-01-01 | Stop reason: HOSPADM

## 2025-01-01 RX ORDER — SODIUM CHLORIDE 0.9 % (FLUSH) 0.9 %
5-40 SYRINGE (ML) INJECTION EVERY 12 HOURS SCHEDULED
Status: DISCONTINUED | OUTPATIENT
Start: 2025-01-01 | End: 2025-01-01 | Stop reason: HOSPADM

## 2025-01-01 RX ORDER — FUROSEMIDE 10 MG/ML
20 INJECTION INTRAMUSCULAR; INTRAVENOUS 2 TIMES DAILY
Status: DISCONTINUED | OUTPATIENT
Start: 2025-01-01 | End: 2025-01-01

## 2025-01-01 RX ORDER — POLYETHYLENE GLYCOL 3350 17 G/17G
17 POWDER, FOR SOLUTION ORAL DAILY
Status: DISCONTINUED | OUTPATIENT
Start: 2025-01-01 | End: 2025-01-01 | Stop reason: HOSPADM

## 2025-01-01 RX ORDER — SODIUM CHLORIDE, SODIUM LACTATE, POTASSIUM CHLORIDE, CALCIUM CHLORIDE 600; 310; 30; 20 MG/100ML; MG/100ML; MG/100ML; MG/100ML
INJECTION, SOLUTION INTRAVENOUS CONTINUOUS
Status: DISCONTINUED | OUTPATIENT
Start: 2025-01-01 | End: 2025-01-01 | Stop reason: HOSPADM

## 2025-01-01 RX ORDER — POTASSIUM CHLORIDE 1500 MG/1
40 TABLET, EXTENDED RELEASE ORAL PRN
Status: DISCONTINUED | OUTPATIENT
Start: 2025-01-01 | End: 2025-01-01 | Stop reason: HOSPADM

## 2025-01-01 RX ORDER — LEVALBUTEROL INHALATION SOLUTION 0.63 MG/3ML
0.63 SOLUTION RESPIRATORY (INHALATION) EVERY 8 HOURS PRN
Status: DISCONTINUED | OUTPATIENT
Start: 2025-01-01 | End: 2025-01-01 | Stop reason: HOSPADM

## 2025-01-01 RX ORDER — FUROSEMIDE 10 MG/ML
20 INJECTION INTRAMUSCULAR; INTRAVENOUS DAILY
Status: DISCONTINUED | OUTPATIENT
Start: 2025-01-01 | End: 2025-01-01

## 2025-01-01 RX ORDER — HEPARIN SODIUM 1000 [USP'U]/ML
4000 INJECTION, SOLUTION INTRAVENOUS; SUBCUTANEOUS ONCE
Status: COMPLETED | OUTPATIENT
Start: 2025-01-01 | End: 2025-01-01

## 2025-01-01 RX ORDER — IPRATROPIUM BROMIDE AND ALBUTEROL SULFATE 2.5; .5 MG/3ML; MG/3ML
1 SOLUTION RESPIRATORY (INHALATION) 2 TIMES DAILY
Status: DISCONTINUED | OUTPATIENT
Start: 2025-01-01 | End: 2025-01-01 | Stop reason: HOSPADM

## 2025-01-01 RX ORDER — BUPIVACAINE HYDROCHLORIDE AND EPINEPHRINE 5; 5 MG/ML; UG/ML
INJECTION, SOLUTION EPIDURAL; INTRACAUDAL; PERINEURAL PRN
Status: DISCONTINUED | OUTPATIENT
Start: 2025-01-01 | End: 2025-01-01 | Stop reason: ALTCHOICE

## 2025-01-01 RX ORDER — HEPARIN SODIUM 1000 [USP'U]/ML
4000 INJECTION, SOLUTION INTRAVENOUS; SUBCUTANEOUS PRN
Status: DISCONTINUED | OUTPATIENT
Start: 2025-01-01 | End: 2025-01-01

## 2025-01-01 RX ORDER — FUROSEMIDE 40 MG/1
40 TABLET ORAL DAILY
Status: DISCONTINUED | OUTPATIENT
Start: 2025-01-01 | End: 2025-01-01 | Stop reason: HOSPADM

## 2025-01-01 RX ORDER — AMIODARONE HYDROCHLORIDE 200 MG/1
200 TABLET ORAL 2 TIMES DAILY
Qty: 60 TABLET | Refills: 1 | Status: SHIPPED
Start: 2025-01-01 | End: 2025-04-29

## 2025-01-01 RX ORDER — ONDANSETRON 4 MG/1
4 TABLET, ORALLY DISINTEGRATING ORAL EVERY 8 HOURS PRN
Status: DISCONTINUED | OUTPATIENT
Start: 2025-01-01 | End: 2025-01-01 | Stop reason: HOSPADM

## 2025-01-01 RX ORDER — MECLIZINE HYDROCHLORIDE 25 MG/1
25 TABLET ORAL 3 TIMES DAILY PRN
Status: DISCONTINUED | OUTPATIENT
Start: 2025-01-01 | End: 2025-01-01 | Stop reason: HOSPADM

## 2025-01-01 RX ORDER — NALOXONE HYDROCHLORIDE 0.4 MG/ML
INJECTION, SOLUTION INTRAMUSCULAR; INTRAVENOUS; SUBCUTANEOUS PRN
Status: DISCONTINUED | OUTPATIENT
Start: 2025-01-01 | End: 2025-01-01 | Stop reason: HOSPADM

## 2025-01-01 RX ORDER — CALCIUM CARBONATE 500 MG/1
500 TABLET, CHEWABLE ORAL 3 TIMES DAILY PRN
Status: DISCONTINUED | OUTPATIENT
Start: 2025-01-01 | End: 2025-01-01 | Stop reason: HOSPADM

## 2025-01-01 RX ORDER — OXYCODONE HYDROCHLORIDE 5 MG/1
5 TABLET ORAL EVERY 4 HOURS PRN
Refills: 0 | Status: DISCONTINUED | OUTPATIENT
Start: 2025-01-01 | End: 2025-01-01 | Stop reason: HOSPADM

## 2025-01-01 RX ORDER — ONDANSETRON 2 MG/ML
4 INJECTION INTRAMUSCULAR; INTRAVENOUS EVERY 6 HOURS PRN
Status: DISCONTINUED | OUTPATIENT
Start: 2025-01-01 | End: 2025-01-01 | Stop reason: HOSPADM

## 2025-01-01 RX ORDER — FUROSEMIDE 10 MG/ML
40 INJECTION INTRAMUSCULAR; INTRAVENOUS ONCE
Status: COMPLETED | OUTPATIENT
Start: 2025-01-01 | End: 2025-01-01

## 2025-01-01 RX ORDER — IPRATROPIUM BROMIDE AND ALBUTEROL SULFATE 2.5; .5 MG/3ML; MG/3ML
1 SOLUTION RESPIRATORY (INHALATION)
Status: DISCONTINUED | OUTPATIENT
Start: 2025-01-01 | End: 2025-01-01

## 2025-01-01 RX ORDER — DOCUSATE SODIUM 100 MG/1
100 CAPSULE, LIQUID FILLED ORAL 2 TIMES DAILY PRN
Status: DISCONTINUED | OUTPATIENT
Start: 2025-01-01 | End: 2025-01-01 | Stop reason: HOSPADM

## 2025-01-01 RX ORDER — SODIUM PHOSPHATE, DIBASIC AND SODIUM PHOSPHATE, MONOBASIC 7; 19 G/230ML; G/230ML
1 ENEMA RECTAL
Status: COMPLETED | OUTPATIENT
Start: 2025-01-01 | End: 2025-01-01

## 2025-01-01 RX ORDER — POTASSIUM CHLORIDE 1500 MG/1
20 TABLET, EXTENDED RELEASE ORAL ONCE
Status: COMPLETED | OUTPATIENT
Start: 2025-01-01 | End: 2025-01-01

## 2025-01-01 RX ORDER — ONDANSETRON 2 MG/ML
4 INJECTION INTRAMUSCULAR; INTRAVENOUS
Status: DISCONTINUED | OUTPATIENT
Start: 2025-01-01 | End: 2025-01-01 | Stop reason: HOSPADM

## 2025-01-01 RX ORDER — POTASSIUM CHLORIDE 7.45 MG/ML
10 INJECTION INTRAVENOUS PRN
Status: DISCONTINUED | OUTPATIENT
Start: 2025-01-01 | End: 2025-01-01 | Stop reason: HOSPADM

## 2025-01-01 RX ORDER — HEPARIN SODIUM 10000 [USP'U]/100ML
5-30 INJECTION, SOLUTION INTRAVENOUS CONTINUOUS
Status: DISPENSED | OUTPATIENT
Start: 2025-01-01 | End: 2025-01-01

## 2025-01-01 RX ORDER — HEPARIN SODIUM 10000 [USP'U]/100ML
5-30 INJECTION, SOLUTION INTRAVENOUS CONTINUOUS
Status: DISCONTINUED | OUTPATIENT
Start: 2025-01-01 | End: 2025-01-01

## 2025-01-01 RX ORDER — LIDOCAINE HYDROCHLORIDE 10 MG/ML
50 INJECTION, SOLUTION EPIDURAL; INFILTRATION; INTRACAUDAL; PERINEURAL ONCE
Status: DISCONTINUED | OUTPATIENT
Start: 2025-01-01 | End: 2025-01-01 | Stop reason: HOSPADM

## 2025-01-01 RX ORDER — FUROSEMIDE 10 MG/ML
60 INJECTION INTRAMUSCULAR; INTRAVENOUS DAILY
Status: DISCONTINUED | OUTPATIENT
Start: 2025-01-01 | End: 2025-01-01

## 2025-01-01 RX ORDER — HEPARIN SODIUM 1000 [USP'U]/ML
2000 INJECTION, SOLUTION INTRAVENOUS; SUBCUTANEOUS PRN
Status: DISCONTINUED | OUTPATIENT
Start: 2025-01-01 | End: 2025-01-01 | Stop reason: SDUPTHER

## 2025-01-01 RX ORDER — HEPARIN SODIUM 1000 [USP'U]/ML
2000 INJECTION, SOLUTION INTRAVENOUS; SUBCUTANEOUS PRN
Status: DISCONTINUED | OUTPATIENT
Start: 2025-01-01 | End: 2025-01-01

## 2025-01-01 RX ORDER — AMIODARONE HYDROCHLORIDE 200 MG/1
200 TABLET ORAL 2 TIMES DAILY
Status: DISCONTINUED | OUTPATIENT
Start: 2025-01-01 | End: 2025-01-01 | Stop reason: HOSPADM

## 2025-01-01 RX ORDER — SODIUM CHLORIDE 9 MG/ML
INJECTION, SOLUTION INTRAVENOUS PRN
Status: DISCONTINUED | OUTPATIENT
Start: 2025-01-01 | End: 2025-01-01 | Stop reason: HOSPADM

## 2025-01-01 RX ORDER — UBIDECARENONE 100 MG
100 CAPSULE ORAL
Status: DISCONTINUED | OUTPATIENT
Start: 2025-01-01 | End: 2025-01-01 | Stop reason: HOSPADM

## 2025-01-01 RX ORDER — HEPARIN SODIUM 1000 [USP'U]/ML
4000 INJECTION, SOLUTION INTRAVENOUS; SUBCUTANEOUS ONCE
Status: DISCONTINUED | OUTPATIENT
Start: 2025-01-01 | End: 2025-01-01

## 2025-01-01 RX ORDER — AMIODARONE HYDROCHLORIDE 200 MG/1
200 TABLET ORAL 2 TIMES DAILY
Status: DISCONTINUED | OUTPATIENT
Start: 2025-01-01 | End: 2025-01-01

## 2025-01-01 RX ORDER — ACETAMINOPHEN 500 MG
1000 TABLET ORAL ONCE
Status: DISCONTINUED | OUTPATIENT
Start: 2025-01-01 | End: 2025-01-01 | Stop reason: HOSPADM

## 2025-01-01 RX ORDER — CIPROFLOXACIN 500 MG/1
500 TABLET, FILM COATED ORAL EVERY 12 HOURS
Status: DISCONTINUED | OUTPATIENT
Start: 2025-01-01 | End: 2025-01-01 | Stop reason: HOSPADM

## 2025-01-01 RX ORDER — TRAZODONE HYDROCHLORIDE 50 MG/1
50 TABLET ORAL NIGHTLY PRN
Status: DISCONTINUED | OUTPATIENT
Start: 2025-01-01 | End: 2025-01-01 | Stop reason: HOSPADM

## 2025-01-01 RX ORDER — MIDAZOLAM HYDROCHLORIDE 2 MG/2ML
2 INJECTION, SOLUTION INTRAMUSCULAR; INTRAVENOUS
Status: DISCONTINUED | OUTPATIENT
Start: 2025-01-01 | End: 2025-01-01 | Stop reason: HOSPADM

## 2025-01-01 RX ORDER — ACETAMINOPHEN 325 MG/1
650 TABLET ORAL EVERY 6 HOURS PRN
Status: DISCONTINUED | OUTPATIENT
Start: 2025-01-01 | End: 2025-01-01 | Stop reason: HOSPADM

## 2025-01-01 RX ORDER — POLYETHYLENE GLYCOL 3350 17 G/17G
17 POWDER, FOR SOLUTION ORAL DAILY PRN
Status: DISCONTINUED | OUTPATIENT
Start: 2025-01-01 | End: 2025-01-01

## 2025-01-01 RX ORDER — LISINOPRIL 20 MG/1
20 TABLET ORAL DAILY
Qty: 180 TABLET | Refills: 3 | Status: SHIPPED
Start: 2025-01-01 | End: 2025-04-29

## 2025-01-01 RX ORDER — ACETAMINOPHEN 650 MG/1
650 SUPPOSITORY RECTAL EVERY 6 HOURS PRN
Status: DISCONTINUED | OUTPATIENT
Start: 2025-01-01 | End: 2025-01-01 | Stop reason: HOSPADM

## 2025-01-01 RX ORDER — SODIUM CHLORIDE 0.9 % (FLUSH) 0.9 %
5-40 SYRINGE (ML) INJECTION PRN
Status: CANCELLED | OUTPATIENT
Start: 2025-01-01

## 2025-01-01 RX ORDER — LEVOTHYROXINE SODIUM 88 UG/1
88 TABLET ORAL DAILY
Status: DISCONTINUED | OUTPATIENT
Start: 2025-01-01 | End: 2025-01-01

## 2025-01-01 RX ORDER — SODIUM CHLORIDE 9 MG/ML
25 INJECTION, SOLUTION INTRAVENOUS PRN
Status: CANCELLED | OUTPATIENT
Start: 2025-01-01

## 2025-01-01 RX ORDER — CIPROFLOXACIN 2 MG/ML
400 INJECTION, SOLUTION INTRAVENOUS EVERY 12 HOURS
Status: DISCONTINUED | OUTPATIENT
Start: 2025-01-01 | End: 2025-01-01

## 2025-01-01 RX ORDER — MAGNESIUM SULFATE IN WATER 40 MG/ML
2000 INJECTION, SOLUTION INTRAVENOUS PRN
Status: DISCONTINUED | OUTPATIENT
Start: 2025-01-01 | End: 2025-01-01 | Stop reason: HOSPADM

## 2025-01-01 RX ORDER — PANTOPRAZOLE SODIUM 40 MG/1
40 TABLET, DELAYED RELEASE ORAL
Status: DISCONTINUED | OUTPATIENT
Start: 2025-01-01 | End: 2025-01-01 | Stop reason: HOSPADM

## 2025-01-01 RX ORDER — CIPROFLOXACIN 500 MG/1
500 TABLET, FILM COATED ORAL EVERY 12 HOURS
Qty: 6 TABLET | Refills: 0 | Status: SHIPPED | OUTPATIENT
Start: 2025-01-01 | End: 2025-01-01

## 2025-01-01 RX ORDER — LIDOCAINE HYDROCHLORIDE 10 MG/ML
1 INJECTION, SOLUTION INFILTRATION; PERINEURAL
Status: DISCONTINUED | OUTPATIENT
Start: 2025-01-01 | End: 2025-01-01 | Stop reason: HOSPADM

## 2025-01-01 RX ORDER — SODIUM CHLORIDE 0.9 % (FLUSH) 0.9 %
5-40 SYRINGE (ML) INJECTION EVERY 12 HOURS SCHEDULED
Status: CANCELLED | OUTPATIENT
Start: 2025-01-01

## 2025-01-01 RX ORDER — PROCHLORPERAZINE EDISYLATE 5 MG/ML
5 INJECTION INTRAMUSCULAR; INTRAVENOUS
Status: DISCONTINUED | OUTPATIENT
Start: 2025-01-01 | End: 2025-01-01 | Stop reason: HOSPADM

## 2025-01-01 RX ORDER — LISINOPRIL 20 MG/1
20 TABLET ORAL 2 TIMES DAILY
Status: CANCELLED | OUTPATIENT
Start: 2025-01-01

## 2025-01-01 RX ORDER — HEPARIN SODIUM 1000 [USP'U]/ML
4000 INJECTION, SOLUTION INTRAVENOUS; SUBCUTANEOUS PRN
Status: DISCONTINUED | OUTPATIENT
Start: 2025-01-01 | End: 2025-01-01 | Stop reason: SDUPTHER

## 2025-01-01 RX ORDER — ALPRAZOLAM 0.5 MG
0.25 TABLET ORAL DAILY PRN
Status: DISCONTINUED | OUTPATIENT
Start: 2025-01-01 | End: 2025-01-01 | Stop reason: HOSPADM

## 2025-01-01 RX ORDER — METRONIDAZOLE 500 MG/1
500 TABLET ORAL EVERY 8 HOURS
Status: DISCONTINUED | OUTPATIENT
Start: 2025-01-01 | End: 2025-01-01 | Stop reason: HOSPADM

## 2025-01-01 RX ORDER — DIPHENHYDRAMINE HYDROCHLORIDE 50 MG/ML
12.5 INJECTION, SOLUTION INTRAMUSCULAR; INTRAVENOUS
Status: DISCONTINUED | OUTPATIENT
Start: 2025-01-01 | End: 2025-01-01 | Stop reason: HOSPADM

## 2025-01-01 RX ORDER — OXYCODONE HYDROCHLORIDE 5 MG/1
10 TABLET ORAL PRN
Status: DISCONTINUED | OUTPATIENT
Start: 2025-01-01 | End: 2025-01-01 | Stop reason: HOSPADM

## 2025-01-01 RX ORDER — LISINOPRIL 20 MG/1
20 TABLET ORAL 2 TIMES DAILY
Status: DISCONTINUED | OUTPATIENT
Start: 2025-01-01 | End: 2025-01-01 | Stop reason: HOSPADM

## 2025-01-01 RX ORDER — ATORVASTATIN CALCIUM 10 MG/1
10 TABLET, FILM COATED ORAL EVERY EVENING
Status: DISCONTINUED | OUTPATIENT
Start: 2025-01-01 | End: 2025-01-01 | Stop reason: HOSPADM

## 2025-01-01 RX ORDER — LEVOTHYROXINE SODIUM 88 UG/1
88 TABLET ORAL
Status: DISCONTINUED | OUTPATIENT
Start: 2025-01-01 | End: 2025-01-01 | Stop reason: HOSPADM

## 2025-01-01 RX ORDER — METRONIDAZOLE 500 MG/100ML
500 INJECTION, SOLUTION INTRAVENOUS EVERY 8 HOURS
Status: DISCONTINUED | OUTPATIENT
Start: 2025-01-01 | End: 2025-01-01

## 2025-01-01 RX ORDER — FUROSEMIDE 40 MG/1
40 TABLET ORAL DAILY
Qty: 60 TABLET | Refills: 1 | Status: SHIPPED
Start: 2025-01-01 | End: 2025-04-29

## 2025-01-01 RX ORDER — SODIUM CHLORIDE 1 G/1
1 TABLET ORAL
Status: DISCONTINUED | OUTPATIENT
Start: 2025-01-01 | End: 2025-01-01 | Stop reason: HOSPADM

## 2025-01-01 RX ORDER — FUROSEMIDE 10 MG/ML
40 INJECTION INTRAMUSCULAR; INTRAVENOUS 2 TIMES DAILY
Status: COMPLETED | OUTPATIENT
Start: 2025-01-01 | End: 2025-01-01

## 2025-01-01 RX ORDER — METRONIDAZOLE 500 MG/1
500 TABLET ORAL EVERY 8 HOURS
Qty: 9 TABLET | Refills: 0 | Status: SHIPPED | OUTPATIENT
Start: 2025-01-01 | End: 2025-01-01

## 2025-01-01 RX ADMIN — Medication 1 G: at 13:04

## 2025-01-01 RX ADMIN — Medication 6 MG: at 21:34

## 2025-01-01 RX ADMIN — TRAZODONE HYDROCHLORIDE 50 MG: 50 TABLET ORAL at 20:52

## 2025-01-01 RX ADMIN — ATORVASTATIN CALCIUM 10 MG: 10 TABLET, FILM COATED ORAL at 17:30

## 2025-01-01 RX ADMIN — ASPIRIN 81 MG: 81 TABLET, COATED ORAL at 20:00

## 2025-01-01 RX ADMIN — PANTOPRAZOLE SODIUM 40 MG: 40 TABLET, DELAYED RELEASE ORAL at 06:04

## 2025-01-01 RX ADMIN — SODIUM CHLORIDE, PRESERVATIVE FREE 5 ML: 5 INJECTION INTRAVENOUS at 19:55

## 2025-01-01 RX ADMIN — AMIODARONE HYDROCHLORIDE 1 MG/MIN: 50 INJECTION, SOLUTION INTRAVENOUS at 03:57

## 2025-01-01 RX ADMIN — Medication 1 G: at 08:47

## 2025-01-01 RX ADMIN — IPRATROPIUM BROMIDE AND ALBUTEROL SULFATE 1 DOSE: 2.5; .5 SOLUTION RESPIRATORY (INHALATION) at 15:33

## 2025-01-01 RX ADMIN — LEVOTHYROXINE SODIUM 88 MCG: 0.09 TABLET ORAL at 06:27

## 2025-01-01 RX ADMIN — Medication 15 G: at 09:07

## 2025-01-01 RX ADMIN — CALCIUM CARBONATE (ANTACID) CHEW TAB 500 MG 500 MG: 500 CHEW TAB at 01:57

## 2025-01-01 RX ADMIN — Medication 6 MG: at 20:13

## 2025-01-01 RX ADMIN — METRONIDAZOLE 500 MG: 500 TABLET ORAL at 08:36

## 2025-01-01 RX ADMIN — CIPROFLOXACIN 400 MG: 400 INJECTION, SOLUTION INTRAVENOUS at 01:28

## 2025-01-01 RX ADMIN — METRONIDAZOLE 500 MG: 500 INJECTION, SOLUTION INTRAVENOUS at 05:19

## 2025-01-01 RX ADMIN — SODIUM CHLORIDE, SODIUM LACTATE, POTASSIUM CHLORIDE, AND CALCIUM CHLORIDE: .6; .31; .03; .02 INJECTION, SOLUTION INTRAVENOUS at 11:51

## 2025-01-01 RX ADMIN — LEVOTHYROXINE SODIUM 88 MCG: 0.09 TABLET ORAL at 04:14

## 2025-01-01 RX ADMIN — ASPIRIN 81 MG: 81 TABLET, COATED ORAL at 19:55

## 2025-01-01 RX ADMIN — ACETAMINOPHEN 650 MG: 325 TABLET ORAL at 15:23

## 2025-01-01 RX ADMIN — Medication 15 G: at 20:01

## 2025-01-01 RX ADMIN — Medication 1 G: at 08:33

## 2025-01-01 RX ADMIN — IPRATROPIUM BROMIDE AND ALBUTEROL SULFATE 1 DOSE: 2.5; .5 SOLUTION RESPIRATORY (INHALATION) at 08:27

## 2025-01-01 RX ADMIN — ALPRAZOLAM 0.25 MG: 0.5 TABLET ORAL at 12:49

## 2025-01-01 RX ADMIN — SODIUM CHLORIDE, PRESERVATIVE FREE 10 ML: 5 INJECTION INTRAVENOUS at 20:00

## 2025-01-01 RX ADMIN — CIPROFLOXACIN 400 MG: 400 INJECTION, SOLUTION INTRAVENOUS at 13:09

## 2025-01-01 RX ADMIN — AMIODARONE HYDROCHLORIDE 1 MG/MIN: 50 INJECTION, SOLUTION INTRAVENOUS at 12:38

## 2025-01-01 RX ADMIN — Medication 30 G: at 15:15

## 2025-01-01 RX ADMIN — HEPARIN SODIUM 4000 UNITS: 1000 INJECTION INTRAVENOUS; SUBCUTANEOUS at 13:27

## 2025-01-01 RX ADMIN — IPRATROPIUM BROMIDE AND ALBUTEROL SULFATE 1 DOSE: 2.5; .5 SOLUTION RESPIRATORY (INHALATION) at 11:33

## 2025-01-01 RX ADMIN — LEVOTHYROXINE SODIUM 88 MCG: 0.09 TABLET ORAL at 05:05

## 2025-01-01 RX ADMIN — FUROSEMIDE 40 MG: 10 INJECTION, SOLUTION INTRAMUSCULAR; INTRAVENOUS at 08:33

## 2025-01-01 RX ADMIN — AMIODARONE HYDROCHLORIDE 0.5 MG/MIN: 50 INJECTION, SOLUTION INTRAVENOUS at 05:05

## 2025-01-01 RX ADMIN — IPRATROPIUM BROMIDE AND ALBUTEROL SULFATE 1 DOSE: 2.5; .5 SOLUTION RESPIRATORY (INHALATION) at 16:15

## 2025-01-01 RX ADMIN — SODIUM CHLORIDE, PRESERVATIVE FREE 10 ML: 5 INJECTION INTRAVENOUS at 08:08

## 2025-01-01 RX ADMIN — SODIUM CHLORIDE, PRESERVATIVE FREE 10 ML: 5 INJECTION INTRAVENOUS at 19:55

## 2025-01-01 RX ADMIN — Medication 1 G: at 12:30

## 2025-01-01 RX ADMIN — IPRATROPIUM BROMIDE AND ALBUTEROL SULFATE 1 DOSE: 2.5; .5 SOLUTION RESPIRATORY (INHALATION) at 07:38

## 2025-01-01 RX ADMIN — METRONIDAZOLE 500 MG: 500 INJECTION, SOLUTION INTRAVENOUS at 00:12

## 2025-01-01 RX ADMIN — HEPARIN SODIUM 14 UNITS/KG/HR: 10000 INJECTION, SOLUTION INTRAVENOUS at 17:06

## 2025-01-01 RX ADMIN — Medication 1 G: at 11:47

## 2025-01-01 RX ADMIN — IPRATROPIUM BROMIDE AND ALBUTEROL SULFATE 1 DOSE: 2.5; .5 SOLUTION RESPIRATORY (INHALATION) at 19:39

## 2025-01-01 RX ADMIN — Medication 1 G: at 08:36

## 2025-01-01 RX ADMIN — ACETAMINOPHEN 650 MG: 325 TABLET ORAL at 16:24

## 2025-01-01 RX ADMIN — ATORVASTATIN CALCIUM 10 MG: 10 TABLET, FILM COATED ORAL at 16:57

## 2025-01-01 RX ADMIN — HEPARIN SODIUM 2000 UNITS: 1000 INJECTION INTRAVENOUS; SUBCUTANEOUS at 00:31

## 2025-01-01 RX ADMIN — Medication 1 G: at 09:42

## 2025-01-01 RX ADMIN — ACETAMINOPHEN 650 MG: 325 TABLET ORAL at 14:51

## 2025-01-01 RX ADMIN — PANTOPRAZOLE SODIUM 40 MG: 40 TABLET, DELAYED RELEASE ORAL at 18:19

## 2025-01-01 RX ADMIN — SODIUM CHLORIDE, PRESERVATIVE FREE 10 ML: 5 INJECTION INTRAVENOUS at 19:50

## 2025-01-01 RX ADMIN — SODIUM CHLORIDE, PRESERVATIVE FREE 10 ML: 5 INJECTION INTRAVENOUS at 20:13

## 2025-01-01 RX ADMIN — AMIODARONE HYDROCHLORIDE 1 MG/MIN: 50 INJECTION, SOLUTION INTRAVENOUS at 05:45

## 2025-01-01 RX ADMIN — METRONIDAZOLE 500 MG: 500 INJECTION, SOLUTION INTRAVENOUS at 15:03

## 2025-01-01 RX ADMIN — ACETAMINOPHEN 650 MG: 325 TABLET ORAL at 17:28

## 2025-01-01 RX ADMIN — Medication 30 G: at 08:36

## 2025-01-01 RX ADMIN — ASPIRIN 81 MG: 81 TABLET, COATED ORAL at 09:16

## 2025-01-01 RX ADMIN — APIXABAN 5 MG: 5 TABLET, FILM COATED ORAL at 07:53

## 2025-01-01 RX ADMIN — HEPARIN SODIUM 14 UNITS/KG/HR: 10000 INJECTION, SOLUTION INTRAVENOUS at 07:11

## 2025-01-01 RX ADMIN — PANTOPRAZOLE SODIUM 40 MG: 40 INJECTION, POWDER, LYOPHILIZED, FOR SOLUTION INTRAVENOUS at 14:15

## 2025-01-01 RX ADMIN — SODIUM CHLORIDE, PRESERVATIVE FREE 5 ML: 5 INJECTION INTRAVENOUS at 15:57

## 2025-01-01 RX ADMIN — FUROSEMIDE 60 MG: 10 INJECTION, SOLUTION INTRAVENOUS at 08:36

## 2025-01-01 RX ADMIN — ASPIRIN 81 MG: 81 TABLET, COATED ORAL at 20:51

## 2025-01-01 RX ADMIN — Medication 1 G: at 17:28

## 2025-01-01 RX ADMIN — IPRATROPIUM BROMIDE AND ALBUTEROL SULFATE 1 DOSE: 2.5; .5 SOLUTION RESPIRATORY (INHALATION) at 11:20

## 2025-01-01 RX ADMIN — Medication 15 G: at 09:43

## 2025-01-01 RX ADMIN — METRONIDAZOLE 500 MG: 500 INJECTION, SOLUTION INTRAVENOUS at 21:06

## 2025-01-01 RX ADMIN — ONDANSETRON 4 MG: 2 INJECTION, SOLUTION INTRAMUSCULAR; INTRAVENOUS at 14:51

## 2025-01-01 RX ADMIN — TRAZODONE HYDROCHLORIDE 50 MG: 50 TABLET ORAL at 21:36

## 2025-01-01 RX ADMIN — ATORVASTATIN CALCIUM 10 MG: 10 TABLET, FILM COATED ORAL at 17:29

## 2025-01-01 RX ADMIN — Medication 30 G: at 19:55

## 2025-01-01 RX ADMIN — APIXABAN 5 MG: 5 TABLET, FILM COATED ORAL at 14:27

## 2025-01-01 RX ADMIN — PANTOPRAZOLE SODIUM 40 MG: 40 TABLET, DELAYED RELEASE ORAL at 16:29

## 2025-01-01 RX ADMIN — AMIODARONE HYDROCHLORIDE 1 MG/MIN: 50 INJECTION, SOLUTION INTRAVENOUS at 20:18

## 2025-01-01 RX ADMIN — AMIODARONE HYDROCHLORIDE 1 MG/MIN: 50 INJECTION, SOLUTION INTRAVENOUS at 20:23

## 2025-01-01 RX ADMIN — IPRATROPIUM BROMIDE AND ALBUTEROL SULFATE 1 DOSE: 2.5; .5 SOLUTION RESPIRATORY (INHALATION) at 15:46

## 2025-01-01 RX ADMIN — FUROSEMIDE 60 MG: 10 INJECTION, SOLUTION INTRAVENOUS at 14:53

## 2025-01-01 RX ADMIN — PHENOL 1 SPRAY: 1.5 LIQUID ORAL at 15:11

## 2025-01-01 RX ADMIN — ACETAMINOPHEN 650 MG: 325 TABLET ORAL at 20:13

## 2025-01-01 RX ADMIN — ACETAMINOPHEN 650 MG: 325 TABLET ORAL at 10:10

## 2025-01-01 RX ADMIN — FUROSEMIDE 40 MG: 10 INJECTION, SOLUTION INTRAMUSCULAR; INTRAVENOUS at 00:05

## 2025-01-01 RX ADMIN — POLYETHYLENE GLYCOL 3350 17 G: 17 POWDER, FOR SOLUTION ORAL at 13:00

## 2025-01-01 RX ADMIN — METRONIDAZOLE 500 MG: 500 TABLET ORAL at 16:10

## 2025-01-01 RX ADMIN — CIPROFLOXACIN 400 MG: 400 INJECTION, SOLUTION INTRAVENOUS at 01:00

## 2025-01-01 RX ADMIN — Medication 30 G: at 07:52

## 2025-01-01 RX ADMIN — TRAZODONE HYDROCHLORIDE 50 MG: 50 TABLET ORAL at 21:02

## 2025-01-01 RX ADMIN — TRAZODONE HYDROCHLORIDE 50 MG: 50 TABLET ORAL at 23:43

## 2025-01-01 RX ADMIN — IPRATROPIUM BROMIDE AND ALBUTEROL SULFATE 1 DOSE: 2.5; .5 SOLUTION RESPIRATORY (INHALATION) at 20:19

## 2025-01-01 RX ADMIN — Medication 1 G: at 13:00

## 2025-01-01 RX ADMIN — OXYCODONE 5 MG: 5 TABLET ORAL at 01:47

## 2025-01-01 RX ADMIN — FUROSEMIDE 40 MG: 10 INJECTION, SOLUTION INTRAMUSCULAR; INTRAVENOUS at 08:00

## 2025-01-01 RX ADMIN — OXYCODONE 5 MG: 5 TABLET ORAL at 23:16

## 2025-01-01 RX ADMIN — Medication 1 G: at 09:16

## 2025-01-01 RX ADMIN — IPRATROPIUM BROMIDE AND ALBUTEROL SULFATE 1 DOSE: 2.5; .5 SOLUTION RESPIRATORY (INHALATION) at 07:18

## 2025-01-01 RX ADMIN — FUROSEMIDE 40 MG: 10 INJECTION, SOLUTION INTRAMUSCULAR; INTRAVENOUS at 17:46

## 2025-01-01 RX ADMIN — LEVOTHYROXINE SODIUM 88 MCG: 0.09 TABLET ORAL at 05:39

## 2025-01-01 RX ADMIN — Medication 15 G: at 08:47

## 2025-01-01 RX ADMIN — CIPROFLOXACIN HYDROCHLORIDE 500 MG: 500 TABLET, FILM COATED ORAL at 18:41

## 2025-01-01 RX ADMIN — ACETAMINOPHEN 650 MG: 325 TABLET ORAL at 22:50

## 2025-01-01 RX ADMIN — AMIODARONE HYDROCHLORIDE 1 MG/MIN: 50 INJECTION, SOLUTION INTRAVENOUS at 02:54

## 2025-01-01 RX ADMIN — ATORVASTATIN CALCIUM 10 MG: 10 TABLET, FILM COATED ORAL at 18:38

## 2025-01-01 RX ADMIN — HEPARIN SODIUM 2000 UNITS: 1000 INJECTION INTRAVENOUS; SUBCUTANEOUS at 09:51

## 2025-01-01 RX ADMIN — PANTOPRAZOLE SODIUM 40 MG: 40 TABLET, DELAYED RELEASE ORAL at 04:14

## 2025-01-01 RX ADMIN — CIPROFLOXACIN 400 MG: 400 INJECTION, SOLUTION INTRAVENOUS at 15:44

## 2025-01-01 RX ADMIN — PANTOPRAZOLE SODIUM 40 MG: 40 TABLET, DELAYED RELEASE ORAL at 05:39

## 2025-01-01 RX ADMIN — Medication 1 G: at 06:15

## 2025-01-01 RX ADMIN — APIXABAN 5 MG: 5 TABLET, FILM COATED ORAL at 20:00

## 2025-01-01 RX ADMIN — METRONIDAZOLE 500 MG: 500 INJECTION, SOLUTION INTRAVENOUS at 13:06

## 2025-01-01 RX ADMIN — METRONIDAZOLE 500 MG: 500 TABLET ORAL at 07:53

## 2025-01-01 RX ADMIN — IPRATROPIUM BROMIDE AND ALBUTEROL SULFATE 1 DOSE: 2.5; .5 SOLUTION RESPIRATORY (INHALATION) at 16:12

## 2025-01-01 RX ADMIN — SODIUM CHLORIDE, PRESERVATIVE FREE 10 ML: 5 INJECTION INTRAVENOUS at 08:00

## 2025-01-01 RX ADMIN — Medication 1 G: at 18:42

## 2025-01-01 RX ADMIN — Medication 6 MG: at 20:52

## 2025-01-01 RX ADMIN — SODIUM CHLORIDE, PRESERVATIVE FREE 10 ML: 5 INJECTION INTRAVENOUS at 08:49

## 2025-01-01 RX ADMIN — ASPIRIN 81 MG: 81 TABLET, COATED ORAL at 08:00

## 2025-01-01 RX ADMIN — AMIODARONE HYDROCHLORIDE 200 MG: 200 TABLET ORAL at 08:36

## 2025-01-01 RX ADMIN — TRAZODONE HYDROCHLORIDE 50 MG: 50 TABLET ORAL at 20:13

## 2025-01-01 RX ADMIN — Medication 30 G: at 20:00

## 2025-01-01 RX ADMIN — Medication 1 G: at 17:29

## 2025-01-01 RX ADMIN — LISINOPRIL 20 MG: 20 TABLET ORAL at 21:41

## 2025-01-01 RX ADMIN — FUROSEMIDE 40 MG: 40 TABLET ORAL at 07:53

## 2025-01-01 RX ADMIN — OXYCODONE 5 MG: 5 TABLET ORAL at 05:11

## 2025-01-01 RX ADMIN — CIPROFLOXACIN HYDROCHLORIDE 500 MG: 500 TABLET, FILM COATED ORAL at 06:04

## 2025-01-01 RX ADMIN — ATORVASTATIN CALCIUM 10 MG: 10 TABLET, FILM COATED ORAL at 16:56

## 2025-01-01 RX ADMIN — SODIUM CHLORIDE, PRESERVATIVE FREE 10 ML: 5 INJECTION INTRAVENOUS at 09:17

## 2025-01-01 RX ADMIN — ALPRAZOLAM 0.25 MG: 0.5 TABLET ORAL at 16:58

## 2025-01-01 RX ADMIN — HEPARIN SODIUM 5 UNITS/KG/HR: 10000 INJECTION, SOLUTION INTRAVENOUS at 13:38

## 2025-01-01 RX ADMIN — LEVOTHYROXINE SODIUM 88 MCG: 0.09 TABLET ORAL at 06:04

## 2025-01-01 RX ADMIN — AMIODARONE HYDROCHLORIDE 200 MG: 200 TABLET ORAL at 17:00

## 2025-01-01 RX ADMIN — AMIODARONE HYDROCHLORIDE 1 MG/MIN: 50 INJECTION, SOLUTION INTRAVENOUS at 10:58

## 2025-01-01 RX ADMIN — IPRATROPIUM BROMIDE AND ALBUTEROL SULFATE 1 DOSE: 2.5; .5 SOLUTION RESPIRATORY (INHALATION) at 19:16

## 2025-01-01 RX ADMIN — PANTOPRAZOLE SODIUM 40 MG: 40 TABLET, DELAYED RELEASE ORAL at 17:00

## 2025-01-01 RX ADMIN — Medication 30 G: at 19:50

## 2025-01-01 RX ADMIN — Medication 1 G: at 07:53

## 2025-01-01 RX ADMIN — SODIUM CHLORIDE, PRESERVATIVE FREE 5 ML: 5 INJECTION INTRAVENOUS at 20:52

## 2025-01-01 RX ADMIN — ASPIRIN 81 MG: 81 TABLET, COATED ORAL at 08:33

## 2025-01-01 RX ADMIN — ASPIRIN 81 MG: 81 TABLET, COATED ORAL at 19:50

## 2025-01-01 RX ADMIN — LEVOTHYROXINE SODIUM 88 MCG: 0.09 TABLET ORAL at 06:15

## 2025-01-01 RX ADMIN — SODIUM CHLORIDE, PRESERVATIVE FREE 10 ML: 5 INJECTION INTRAVENOUS at 09:10

## 2025-01-01 RX ADMIN — IPRATROPIUM BROMIDE AND ALBUTEROL SULFATE 1 DOSE: 2.5; .5 SOLUTION RESPIRATORY (INHALATION) at 07:24

## 2025-01-01 RX ADMIN — ATORVASTATIN CALCIUM 10 MG: 10 TABLET, FILM COATED ORAL at 17:35

## 2025-01-01 RX ADMIN — AMIODARONE HYDROCHLORIDE 200 MG: 200 TABLET ORAL at 07:53

## 2025-01-01 RX ADMIN — AMIODARONE HYDROCHLORIDE 1 MG/MIN: 50 INJECTION, SOLUTION INTRAVENOUS at 11:20

## 2025-01-01 RX ADMIN — FUROSEMIDE 40 MG: 10 INJECTION, SOLUTION INTRAMUSCULAR; INTRAVENOUS at 13:02

## 2025-01-01 RX ADMIN — PANTOPRAZOLE SODIUM 40 MG: 40 TABLET, DELAYED RELEASE ORAL at 05:17

## 2025-01-01 RX ADMIN — Medication 1 G: at 12:23

## 2025-01-01 RX ADMIN — ACETAMINOPHEN 650 MG: 325 TABLET ORAL at 21:38

## 2025-01-01 RX ADMIN — AMIODARONE HYDROCHLORIDE 1 MG/MIN: 50 INJECTION, SOLUTION INTRAVENOUS at 03:15

## 2025-01-01 RX ADMIN — TRAZODONE HYDROCHLORIDE 50 MG: 50 TABLET ORAL at 21:38

## 2025-01-01 RX ADMIN — Medication 15 G: at 08:33

## 2025-01-01 RX ADMIN — ACETAMINOPHEN 650 MG: 325 TABLET ORAL at 02:49

## 2025-01-01 RX ADMIN — Medication 1 G: at 16:29

## 2025-01-01 RX ADMIN — Medication 15 G: at 20:51

## 2025-01-01 RX ADMIN — ALPRAZOLAM 0.25 MG: 0.5 TABLET ORAL at 14:15

## 2025-01-01 RX ADMIN — Medication 1 G: at 18:20

## 2025-01-01 RX ADMIN — LEVOTHYROXINE SODIUM 88 MCG: 0.09 TABLET ORAL at 05:40

## 2025-01-01 RX ADMIN — Medication 1 G: at 17:30

## 2025-01-01 RX ADMIN — SODIUM CHLORIDE, PRESERVATIVE FREE 10 ML: 5 INJECTION INTRAVENOUS at 08:01

## 2025-01-01 RX ADMIN — DOCUSATE SODIUM 100 MG: 100 CAPSULE, LIQUID FILLED ORAL at 20:13

## 2025-01-01 RX ADMIN — DEXTROSE MONOHYDRATE 150 MG: 50 INJECTION, SOLUTION INTRAVENOUS at 11:02

## 2025-01-01 RX ADMIN — FUROSEMIDE 40 MG: 10 INJECTION, SOLUTION INTRAMUSCULAR; INTRAVENOUS at 18:04

## 2025-01-01 RX ADMIN — ASPIRIN 81 MG: 81 TABLET, COATED ORAL at 20:13

## 2025-01-01 RX ADMIN — SODIUM CHLORIDE, PRESERVATIVE FREE 10 ML: 5 INJECTION INTRAVENOUS at 08:34

## 2025-01-01 RX ADMIN — Medication 6 MG: at 22:50

## 2025-01-01 RX ADMIN — Medication 15 G: at 20:13

## 2025-01-01 RX ADMIN — SODIUM CHLORIDE, PRESERVATIVE FREE 5 ML: 5 INJECTION INTRAVENOUS at 20:14

## 2025-01-01 RX ADMIN — DEXTROSE 150 MG: 50 INJECTION, SOLUTION INTRAVENOUS at 11:12

## 2025-01-01 RX ADMIN — PHENOL 1 SPRAY: 1.5 LIQUID ORAL at 21:05

## 2025-01-01 RX ADMIN — ASPIRIN 81 MG: 81 TABLET, COATED ORAL at 09:41

## 2025-01-01 RX ADMIN — CIPROFLOXACIN 400 MG: 400 INJECTION, SOLUTION INTRAVENOUS at 15:01

## 2025-01-01 RX ADMIN — Medication 6 MG: at 20:53

## 2025-01-01 RX ADMIN — Medication 1 G: at 16:57

## 2025-01-01 RX ADMIN — Medication 15 G: at 19:55

## 2025-01-01 RX ADMIN — SODIUM CHLORIDE, PRESERVATIVE FREE 10 ML: 5 INJECTION INTRAVENOUS at 14:42

## 2025-01-01 RX ADMIN — PANTOPRAZOLE SODIUM 40 MG: 40 TABLET, DELAYED RELEASE ORAL at 05:05

## 2025-01-01 RX ADMIN — LISINOPRIL 20 MG: 20 TABLET ORAL at 09:16

## 2025-01-01 RX ADMIN — POLYETHYLENE GLYCOL 3350 17 G: 17 POWDER, FOR SOLUTION ORAL at 07:53

## 2025-01-01 RX ADMIN — Medication 1 G: at 18:05

## 2025-01-01 RX ADMIN — ATORVASTATIN CALCIUM 10 MG: 10 TABLET, FILM COATED ORAL at 18:42

## 2025-01-01 RX ADMIN — Medication 15 G: at 08:01

## 2025-01-01 RX ADMIN — AMIODARONE HYDROCHLORIDE 200 MG: 200 TABLET ORAL at 20:00

## 2025-01-01 RX ADMIN — SODIUM PHOSPHATE, DIBASIC AND SODIUM PHOSPHATE, MONOBASIC 1 ENEMA: 7; 19 ENEMA RECTAL at 18:20

## 2025-01-01 RX ADMIN — METRONIDAZOLE 500 MG: 500 TABLET ORAL at 00:15

## 2025-01-01 RX ADMIN — HEPARIN SODIUM 12 UNITS/KG/HR: 10000 INJECTION, SOLUTION INTRAVENOUS at 18:06

## 2025-01-01 RX ADMIN — AMIODARONE HYDROCHLORIDE 1 MG/MIN: 50 INJECTION, SOLUTION INTRAVENOUS at 11:00

## 2025-01-01 RX ADMIN — ASPIRIN 81 MG: 81 TABLET, COATED ORAL at 09:06

## 2025-01-01 RX ADMIN — ASPIRIN 81 MG: 81 TABLET, COATED ORAL at 21:38

## 2025-01-01 RX ADMIN — LEVALBUTEROL HYDROCHLORIDE 0.63 MG: 0.63 SOLUTION RESPIRATORY (INHALATION) at 03:18

## 2025-01-01 RX ADMIN — SODIUM CHLORIDE, PRESERVATIVE FREE 10 ML: 5 INJECTION INTRAVENOUS at 21:38

## 2025-01-01 RX ADMIN — Medication 6 MG: at 21:36

## 2025-01-01 RX ADMIN — LEVOTHYROXINE SODIUM 88 MCG: 0.09 TABLET ORAL at 05:17

## 2025-01-01 RX ADMIN — ACETAMINOPHEN 650 MG: 325 TABLET ORAL at 10:35

## 2025-01-01 RX ADMIN — METRONIDAZOLE 500 MG: 500 INJECTION, SOLUTION INTRAVENOUS at 15:42

## 2025-01-01 RX ADMIN — AMIODARONE HYDROCHLORIDE 1 MG/MIN: 50 INJECTION, SOLUTION INTRAVENOUS at 18:41

## 2025-01-01 RX ADMIN — HEPARIN SODIUM 12 UNITS/KG/HR: 10000 INJECTION, SOLUTION INTRAVENOUS at 19:09

## 2025-01-01 RX ADMIN — ATORVASTATIN CALCIUM 10 MG: 10 TABLET, FILM COATED ORAL at 18:05

## 2025-01-01 RX ADMIN — ASPIRIN 81 MG: 81 TABLET, COATED ORAL at 08:47

## 2025-01-01 RX ADMIN — IPRATROPIUM BROMIDE AND ALBUTEROL SULFATE 1 DOSE: 2.5; .5 SOLUTION RESPIRATORY (INHALATION) at 12:42

## 2025-01-01 RX ADMIN — Medication 15 G: at 20:50

## 2025-01-01 RX ADMIN — POLYETHYLENE GLYCOL 3350 17 G: 17 POWDER, FOR SOLUTION ORAL at 08:49

## 2025-01-01 RX ADMIN — METRONIDAZOLE 500 MG: 500 INJECTION, SOLUTION INTRAVENOUS at 21:05

## 2025-01-01 RX ADMIN — PANTOPRAZOLE SODIUM 40 MG: 40 TABLET, DELAYED RELEASE ORAL at 16:10

## 2025-01-01 RX ADMIN — AMIODARONE HYDROCHLORIDE 1 MG/MIN: 50 INJECTION, SOLUTION INTRAVENOUS at 05:19

## 2025-01-01 RX ADMIN — Medication 6 MG: at 21:02

## 2025-01-01 RX ADMIN — AMIODARONE HYDROCHLORIDE 1 MG/MIN: 50 INJECTION, SOLUTION INTRAVENOUS at 19:53

## 2025-01-01 RX ADMIN — HEPARIN SODIUM 2000 UNITS: 1000 INJECTION INTRAVENOUS; SUBCUTANEOUS at 23:57

## 2025-01-01 RX ADMIN — IPRATROPIUM BROMIDE AND ALBUTEROL SULFATE 1 DOSE: 2.5; .5 SOLUTION RESPIRATORY (INHALATION) at 12:10

## 2025-01-01 RX ADMIN — LEVOTHYROXINE SODIUM 88 MCG: 0.09 TABLET ORAL at 06:19

## 2025-01-01 RX ADMIN — SODIUM CHLORIDE, PRESERVATIVE FREE 10 ML: 5 INJECTION INTRAVENOUS at 09:16

## 2025-01-01 RX ADMIN — IPRATROPIUM BROMIDE AND ALBUTEROL SULFATE 1 DOSE: 2.5; .5 SOLUTION RESPIRATORY (INHALATION) at 19:25

## 2025-01-01 RX ADMIN — TRAZODONE HYDROCHLORIDE 50 MG: 50 TABLET ORAL at 20:53

## 2025-01-01 RX ADMIN — ASPIRIN 81 MG: 81 TABLET, COATED ORAL at 20:27

## 2025-01-01 RX ADMIN — OXYCODONE 5 MG: 5 TABLET ORAL at 20:52

## 2025-01-01 RX ADMIN — HEPARIN SODIUM 14 UNITS/KG/HR: 10000 INJECTION, SOLUTION INTRAVENOUS at 06:21

## 2025-01-01 RX ADMIN — AMIODARONE HYDROCHLORIDE 0.5 MG/MIN: 50 INJECTION, SOLUTION INTRAVENOUS at 15:43

## 2025-01-01 RX ADMIN — ACETAMINOPHEN 650 MG: 325 TABLET ORAL at 20:53

## 2025-01-01 RX ADMIN — POLYETHYLENE GLYCOL 3350 17 G: 17 POWDER, FOR SOLUTION ORAL at 14:48

## 2025-01-01 RX ADMIN — CIPROFLOXACIN 400 MG: 400 INJECTION, SOLUTION INTRAVENOUS at 04:15

## 2025-01-01 RX ADMIN — IPRATROPIUM BROMIDE AND ALBUTEROL SULFATE 1 DOSE: 2.5; .5 SOLUTION RESPIRATORY (INHALATION) at 20:07

## 2025-01-01 RX ADMIN — ASPIRIN 81 MG: 81 TABLET, COATED ORAL at 08:36

## 2025-01-01 RX ADMIN — POTASSIUM CHLORIDE 20 MEQ: 1500 TABLET, EXTENDED RELEASE ORAL at 21:44

## 2025-01-01 RX ADMIN — Medication 1 G: at 09:06

## 2025-01-01 RX ADMIN — SODIUM CHLORIDE, PRESERVATIVE FREE 10 ML: 5 INJECTION INTRAVENOUS at 08:35

## 2025-01-01 RX ADMIN — ASPIRIN 81 MG: 81 TABLET, COATED ORAL at 07:53

## 2025-01-01 RX ADMIN — AMIODARONE HYDROCHLORIDE 0.5 MG/MIN: 50 INJECTION, SOLUTION INTRAVENOUS at 17:00

## 2025-01-01 RX ADMIN — FUROSEMIDE 20 MG: 10 INJECTION, SOLUTION INTRAMUSCULAR; INTRAVENOUS at 09:42

## 2025-01-01 RX ADMIN — Medication 1 G: at 12:22

## 2025-01-01 RX ADMIN — ATORVASTATIN CALCIUM 10 MG: 10 TABLET, FILM COATED ORAL at 18:20

## 2025-01-01 RX ADMIN — Medication 1 G: at 17:13

## 2025-01-01 RX ADMIN — TRAZODONE HYDROCHLORIDE 50 MG: 50 TABLET ORAL at 21:19

## 2025-01-01 ASSESSMENT — PAIN DESCRIPTION - ORIENTATION
ORIENTATION: LEFT;RIGHT;LOWER;POSTERIOR
ORIENTATION: RIGHT;LEFT

## 2025-01-01 ASSESSMENT — PAIN SCALES - GENERAL
PAINLEVEL_OUTOF10: 0
PAINLEVEL_OUTOF10: 2
PAINLEVEL_OUTOF10: 0
PAINLEVEL_OUTOF10: 6
PAINLEVEL_OUTOF10: 0
PAINLEVEL_OUTOF10: 0
PAINLEVEL_OUTOF10: 6
PAINLEVEL_OUTOF10: 0
PAINLEVEL_OUTOF10: 3
PAINLEVEL_OUTOF10: 0
PAINLEVEL_OUTOF10: 6
PAINLEVEL_OUTOF10: 0
PAINLEVEL_OUTOF10: 3
PAINLEVEL_OUTOF10: 0
PAINLEVEL_OUTOF10: 3
PAINLEVEL_OUTOF10: 0
PAINLEVEL_OUTOF10: 6
PAINLEVEL_OUTOF10: 0
PAINLEVEL_OUTOF10: 3
PAINLEVEL_OUTOF10: 5
PAINLEVEL_OUTOF10: 0
PAINLEVEL_OUTOF10: 6
PAINLEVEL_OUTOF10: 0
PAINLEVEL_OUTOF10: 3
PAINLEVEL_OUTOF10: 0
PAINLEVEL_OUTOF10: 3
PAINLEVEL_OUTOF10: 0

## 2025-01-01 ASSESSMENT — PAIN DESCRIPTION - ONSET
ONSET: GRADUAL
ONSET: GRADUAL

## 2025-01-01 ASSESSMENT — PAIN DESCRIPTION - PAIN TYPE
TYPE: CHRONIC PAIN
TYPE: CHRONIC PAIN

## 2025-01-01 ASSESSMENT — LIFESTYLE VARIABLES
HOW MANY STANDARD DRINKS CONTAINING ALCOHOL DO YOU HAVE ON A TYPICAL DAY: PATIENT DOES NOT DRINK
HOW OFTEN DO YOU HAVE A DRINK CONTAINING ALCOHOL: NEVER

## 2025-01-01 ASSESSMENT — PAIN DESCRIPTION - FREQUENCY
FREQUENCY: INTERMITTENT
FREQUENCY: INTERMITTENT

## 2025-01-01 ASSESSMENT — PAIN DESCRIPTION - DESCRIPTORS
DESCRIPTORS: ACHING;DISCOMFORT
DESCRIPTORS: ACHING
DESCRIPTORS: ACHING;DISCOMFORT;CRAMPING
DESCRIPTORS: ACHING;DISCOMFORT
DESCRIPTORS: ACHING;THROBBING
DESCRIPTORS: ACHING;CRAMPING
DESCRIPTORS: ACHING;DISCOMFORT;TINGLING
DESCRIPTORS: ACHING

## 2025-01-01 ASSESSMENT — PAIN DESCRIPTION - LOCATION
LOCATION: HEAD
LOCATION: LEG
LOCATION: HEAD
LOCATION: LEG
LOCATION: LEG
LOCATION: LEG;BACK
LOCATION: LEG
LOCATION: LEG

## 2025-01-01 ASSESSMENT — PAIN - FUNCTIONAL ASSESSMENT
PAIN_FUNCTIONAL_ASSESSMENT: NONE - DENIES PAIN
PAIN_FUNCTIONAL_ASSESSMENT: ACTIVITIES ARE NOT PREVENTED
PAIN_FUNCTIONAL_ASSESSMENT: ACTIVITIES ARE NOT PREVENTED
PAIN_FUNCTIONAL_ASSESSMENT: PREVENTS OR INTERFERES SOME ACTIVE ACTIVITIES AND ADLS
PAIN_FUNCTIONAL_ASSESSMENT: 0-10

## 2025-01-01 ASSESSMENT — PAIN SCALES - WONG BAKER: WONGBAKER_NUMERICALRESPONSE: NO HURT

## 2025-01-21 ENCOUNTER — APPOINTMENT (OUTPATIENT)
Dept: GENERAL RADIOLOGY | Age: 85
End: 2025-01-21
Payer: MEDICARE

## 2025-01-21 ENCOUNTER — APPOINTMENT (OUTPATIENT)
Dept: CT IMAGING | Age: 85
End: 2025-01-21
Payer: MEDICARE

## 2025-01-21 ENCOUNTER — HOSPITAL ENCOUNTER (INPATIENT)
Age: 85
LOS: 4 days | Discharge: HOME | End: 2025-01-25
Attending: EMERGENCY MEDICINE | Admitting: INTERNAL MEDICINE
Payer: MEDICARE

## 2025-01-21 ENCOUNTER — APPOINTMENT (OUTPATIENT)
Dept: ULTRASOUND IMAGING | Age: 85
End: 2025-01-21
Payer: MEDICARE

## 2025-01-21 DIAGNOSIS — R55 SYNCOPE AND COLLAPSE: Primary | ICD-10-CM

## 2025-01-21 DIAGNOSIS — E87.1 HYPONATREMIA: ICD-10-CM

## 2025-01-21 LAB
ALBUMIN SERPL-MCNC: 3.6 G/DL (ref 3.2–4.6)
ALBUMIN/GLOB SERPL: 0.9 (ref 1–1.9)
ALP SERPL-CCNC: 125 U/L (ref 35–104)
ALT SERPL-CCNC: 25 U/L (ref 8–45)
ANION GAP SERPL CALC-SCNC: 11 MMOL/L (ref 7–16)
ANION GAP SERPL CALC-SCNC: 9 MMOL/L (ref 7–16)
APPEARANCE UR: CLEAR
AST SERPL-CCNC: 31 U/L (ref 15–37)
BACTERIA URNS QL MICRO: 0 /HPF
BASOPHILS # BLD: 0.03 K/UL (ref 0–0.2)
BASOPHILS NFR BLD: 0.3 % (ref 0–2)
BILIRUB SERPL-MCNC: 0.5 MG/DL (ref 0–1.2)
BILIRUB UR QL: NEGATIVE
BUN SERPL-MCNC: 10 MG/DL (ref 8–23)
BUN SERPL-MCNC: 10 MG/DL (ref 8–23)
CALCIUM SERPL-MCNC: 7.9 MG/DL (ref 8.8–10.2)
CALCIUM SERPL-MCNC: 8.6 MG/DL (ref 8.8–10.2)
CASTS URNS QL MICRO: 0 /LPF
CHLORIDE SERPL-SCNC: 87 MMOL/L (ref 98–107)
CHLORIDE SERPL-SCNC: 88 MMOL/L (ref 98–107)
CO2 SERPL-SCNC: 23 MMOL/L (ref 20–29)
CO2 SERPL-SCNC: 23 MMOL/L (ref 20–29)
COLOR UR: ABNORMAL
CREAT SERPL-MCNC: 0.64 MG/DL (ref 0.6–1.1)
CREAT SERPL-MCNC: 0.65 MG/DL (ref 0.6–1.1)
CRYSTALS URNS QL MICRO: 0 /LPF
DIFFERENTIAL METHOD BLD: ABNORMAL
EKG ATRIAL RATE: 82 BPM
EKG DIAGNOSIS: NORMAL
EKG P AXIS: 77 DEGREES
EKG P-R INTERVAL: 170 MS
EKG Q-T INTERVAL: 385 MS
EKG QRS DURATION: 85 MS
EKG QTC CALCULATION (BAZETT): 447 MS
EKG R AXIS: 70 DEGREES
EKG T AXIS: 73 DEGREES
EKG VENTRICULAR RATE: 81 BPM
EOSINOPHIL # BLD: 0.08 K/UL (ref 0–0.8)
EOSINOPHIL NFR BLD: 0.7 % (ref 0.5–7.8)
EPI CELLS #/AREA URNS HPF: ABNORMAL /HPF
ERYTHROCYTE [DISTWIDTH] IN BLOOD BY AUTOMATED COUNT: 12.2 % (ref 11.9–14.6)
GLOBULIN SER CALC-MCNC: 3.8 G/DL (ref 2.3–3.5)
GLUCOSE BLD STRIP.AUTO-MCNC: 118 MG/DL (ref 65–100)
GLUCOSE BLD STRIP.AUTO-MCNC: 153 MG/DL (ref 65–100)
GLUCOSE SERPL-MCNC: 121 MG/DL (ref 70–99)
GLUCOSE SERPL-MCNC: 142 MG/DL (ref 70–99)
GLUCOSE UR STRIP.AUTO-MCNC: NEGATIVE MG/DL
HCT VFR BLD AUTO: 40 % (ref 35.8–46.3)
HGB BLD-MCNC: 14.1 G/DL (ref 11.7–15.4)
HGB UR QL STRIP: NEGATIVE
IMM GRANULOCYTES # BLD AUTO: 0.04 K/UL (ref 0–0.5)
IMM GRANULOCYTES NFR BLD AUTO: 0.4 % (ref 0–5)
KETONES UR QL STRIP.AUTO: 15 MG/DL
LEUKOCYTE ESTERASE UR QL STRIP.AUTO: ABNORMAL
LYMPHOCYTES # BLD: 2.63 K/UL (ref 0.5–4.6)
LYMPHOCYTES NFR BLD: 24.4 % (ref 13–44)
MCH RBC QN AUTO: 30.5 PG (ref 26.1–32.9)
MCHC RBC AUTO-ENTMCNC: 35.3 G/DL (ref 31.4–35)
MCV RBC AUTO: 86.6 FL (ref 82–102)
MONOCYTES # BLD: 1.38 K/UL (ref 0.1–1.3)
MONOCYTES NFR BLD: 12.8 % (ref 4–12)
MUCOUS THREADS URNS QL MICRO: 0 /LPF
NEUTS SEG # BLD: 6.6 K/UL (ref 1.7–8.2)
NEUTS SEG NFR BLD: 61.4 % (ref 43–78)
NITRITE UR QL STRIP.AUTO: NEGATIVE
NRBC # BLD: 0 K/UL (ref 0–0.2)
OSMOLALITY SERPL: 257 MOSM/KG H2O (ref 280–301)
OSMOLALITY UR: 448 MOSM/KG H2O (ref 50–1400)
PH UR STRIP: 7 (ref 5–9)
PLATELET # BLD AUTO: 372 K/UL (ref 150–450)
PMV BLD AUTO: 9.4 FL (ref 9.4–12.3)
POTASSIUM SERPL-SCNC: 4.7 MMOL/L (ref 3.5–5.1)
POTASSIUM SERPL-SCNC: 4.8 MMOL/L (ref 3.5–5.1)
PROT SERPL-MCNC: 7.5 G/DL (ref 6.3–8.2)
PROT UR STRIP-MCNC: NEGATIVE MG/DL
RBC # BLD AUTO: 4.62 M/UL (ref 4.05–5.2)
RBC #/AREA URNS HPF: ABNORMAL /HPF
SERVICE CMNT-IMP: ABNORMAL
SERVICE CMNT-IMP: ABNORMAL
SODIUM SERPL-SCNC: 120 MMOL/L (ref 136–145)
SODIUM SERPL-SCNC: 121 MMOL/L (ref 136–145)
SODIUM UR-SCNC: 111 MMOL/L
SP GR UR REFRACTOMETRY: 1.01 (ref 1–1.02)
TROPONIN T SERPL HS-MCNC: 8 NG/L (ref 0–14)
TROPONIN T SERPL HS-MCNC: <6 NG/L (ref 0–14)
TSH W FREE THYROID IF ABNORMAL: 1.36 UIU/ML (ref 0.27–4.2)
URINE CULTURE IF INDICATED: ABNORMAL
UROBILINOGEN UR QL STRIP.AUTO: 1 EU/DL (ref 0.2–1)
WBC # BLD AUTO: 10.8 K/UL (ref 4.3–11.1)
WBC URNS QL MICRO: ABNORMAL /HPF

## 2025-01-21 PROCEDURE — 81001 URINALYSIS AUTO W/SCOPE: CPT

## 2025-01-21 PROCEDURE — 83930 ASSAY OF BLOOD OSMOLALITY: CPT

## 2025-01-21 PROCEDURE — 84484 ASSAY OF TROPONIN QUANT: CPT

## 2025-01-21 PROCEDURE — 2580000003 HC RX 258: Performed by: EMERGENCY MEDICINE

## 2025-01-21 PROCEDURE — 2500000003 HC RX 250 WO HCPCS: Performed by: INTERNAL MEDICINE

## 2025-01-21 PROCEDURE — 85025 COMPLETE CBC W/AUTO DIFF WBC: CPT

## 2025-01-21 PROCEDURE — 71045 X-RAY EXAM CHEST 1 VIEW: CPT

## 2025-01-21 PROCEDURE — 84443 ASSAY THYROID STIM HORMONE: CPT

## 2025-01-21 PROCEDURE — 6370000000 HC RX 637 (ALT 250 FOR IP): Performed by: INTERNAL MEDICINE

## 2025-01-21 PROCEDURE — 93880 EXTRACRANIAL BILAT STUDY: CPT

## 2025-01-21 PROCEDURE — 84300 ASSAY OF URINE SODIUM: CPT

## 2025-01-21 PROCEDURE — 36415 COLL VENOUS BLD VENIPUNCTURE: CPT

## 2025-01-21 PROCEDURE — 2000000000 HC ICU R&B

## 2025-01-21 PROCEDURE — 96361 HYDRATE IV INFUSION ADD-ON: CPT

## 2025-01-21 PROCEDURE — 82962 GLUCOSE BLOOD TEST: CPT

## 2025-01-21 PROCEDURE — 93010 ELECTROCARDIOGRAM REPORT: CPT | Performed by: INTERNAL MEDICINE

## 2025-01-21 PROCEDURE — 80053 COMPREHEN METABOLIC PANEL: CPT

## 2025-01-21 PROCEDURE — 96360 HYDRATION IV INFUSION INIT: CPT

## 2025-01-21 PROCEDURE — 2580000003 HC RX 258: Performed by: INTERNAL MEDICINE

## 2025-01-21 PROCEDURE — 93005 ELECTROCARDIOGRAM TRACING: CPT | Performed by: EMERGENCY MEDICINE

## 2025-01-21 PROCEDURE — 99285 EMERGENCY DEPT VISIT HI MDM: CPT

## 2025-01-21 PROCEDURE — 83935 ASSAY OF URINE OSMOLALITY: CPT

## 2025-01-21 PROCEDURE — 70450 CT HEAD/BRAIN W/O DYE: CPT

## 2025-01-21 RX ORDER — POLYETHYLENE GLYCOL 3350 17 G/17G
17 POWDER, FOR SOLUTION ORAL DAILY PRN
Status: DISCONTINUED | OUTPATIENT
Start: 2025-01-21 | End: 2025-01-25 | Stop reason: HOSPADM

## 2025-01-21 RX ORDER — ONDANSETRON 2 MG/ML
4 INJECTION INTRAMUSCULAR; INTRAVENOUS EVERY 6 HOURS PRN
Status: DISCONTINUED | OUTPATIENT
Start: 2025-01-21 | End: 2025-01-25 | Stop reason: HOSPADM

## 2025-01-21 RX ORDER — 0.9 % SODIUM CHLORIDE 0.9 %
1000 INTRAVENOUS SOLUTION INTRAVENOUS
Status: COMPLETED | OUTPATIENT
Start: 2025-01-21 | End: 2025-01-21

## 2025-01-21 RX ORDER — MAGNESIUM SULFATE IN WATER 40 MG/ML
2000 INJECTION, SOLUTION INTRAVENOUS PRN
Status: DISCONTINUED | OUTPATIENT
Start: 2025-01-21 | End: 2025-01-25 | Stop reason: HOSPADM

## 2025-01-21 RX ORDER — IBUPROFEN 600 MG/1
1 TABLET ORAL PRN
Status: DISCONTINUED | OUTPATIENT
Start: 2025-01-21 | End: 2025-01-25 | Stop reason: HOSPADM

## 2025-01-21 RX ORDER — DEXTROSE MONOHYDRATE 100 MG/ML
INJECTION, SOLUTION INTRAVENOUS CONTINUOUS PRN
Status: DISCONTINUED | OUTPATIENT
Start: 2025-01-21 | End: 2025-01-25 | Stop reason: HOSPADM

## 2025-01-21 RX ORDER — SODIUM CHLORIDE 9 MG/ML
INJECTION, SOLUTION INTRAVENOUS PRN
Status: DISCONTINUED | OUTPATIENT
Start: 2025-01-21 | End: 2025-01-25 | Stop reason: HOSPADM

## 2025-01-21 RX ORDER — SODIUM CHLORIDE, SODIUM LACTATE, POTASSIUM CHLORIDE, CALCIUM CHLORIDE 600; 310; 30; 20 MG/100ML; MG/100ML; MG/100ML; MG/100ML
INJECTION, SOLUTION INTRAVENOUS CONTINUOUS
Status: DISCONTINUED | OUTPATIENT
Start: 2025-01-21 | End: 2025-01-22

## 2025-01-21 RX ORDER — ENOXAPARIN SODIUM 100 MG/ML
40 INJECTION SUBCUTANEOUS DAILY
Status: DISCONTINUED | OUTPATIENT
Start: 2025-01-22 | End: 2025-01-25 | Stop reason: HOSPADM

## 2025-01-21 RX ORDER — INSULIN LISPRO 100 [IU]/ML
0-4 INJECTION, SOLUTION INTRAVENOUS; SUBCUTANEOUS
Status: DISCONTINUED | OUTPATIENT
Start: 2025-01-21 | End: 2025-01-25 | Stop reason: HOSPADM

## 2025-01-21 RX ORDER — SODIUM CHLORIDE 0.9 % (FLUSH) 0.9 %
5-40 SYRINGE (ML) INJECTION EVERY 12 HOURS SCHEDULED
Status: DISCONTINUED | OUTPATIENT
Start: 2025-01-21 | End: 2025-01-25 | Stop reason: HOSPADM

## 2025-01-21 RX ORDER — POTASSIUM CHLORIDE 7.45 MG/ML
10 INJECTION INTRAVENOUS PRN
Status: DISCONTINUED | OUTPATIENT
Start: 2025-01-21 | End: 2025-01-25 | Stop reason: HOSPADM

## 2025-01-21 RX ORDER — ATORVASTATIN CALCIUM 10 MG/1
10 TABLET, FILM COATED ORAL EVERY EVENING
Status: DISCONTINUED | OUTPATIENT
Start: 2025-01-21 | End: 2025-01-22

## 2025-01-21 RX ORDER — POTASSIUM CHLORIDE 1500 MG/1
40 TABLET, EXTENDED RELEASE ORAL PRN
Status: DISCONTINUED | OUTPATIENT
Start: 2025-01-21 | End: 2025-01-25 | Stop reason: HOSPADM

## 2025-01-21 RX ORDER — LABETALOL HYDROCHLORIDE 5 MG/ML
5 INJECTION, SOLUTION INTRAVENOUS EVERY 6 HOURS PRN
Status: DISCONTINUED | OUTPATIENT
Start: 2025-01-21 | End: 2025-01-25 | Stop reason: HOSPADM

## 2025-01-21 RX ORDER — ACETAMINOPHEN 650 MG/1
650 SUPPOSITORY RECTAL EVERY 6 HOURS PRN
Status: DISCONTINUED | OUTPATIENT
Start: 2025-01-21 | End: 2025-01-25 | Stop reason: HOSPADM

## 2025-01-21 RX ORDER — ASPIRIN 81 MG/1
81 TABLET ORAL 2 TIMES DAILY
Status: DISCONTINUED | OUTPATIENT
Start: 2025-01-21 | End: 2025-01-25 | Stop reason: HOSPADM

## 2025-01-21 RX ORDER — MECLIZINE HYDROCHLORIDE 25 MG/1
25 TABLET ORAL 3 TIMES DAILY PRN
Status: DISCONTINUED | OUTPATIENT
Start: 2025-01-21 | End: 2025-01-25 | Stop reason: HOSPADM

## 2025-01-21 RX ORDER — LISINOPRIL 20 MG/1
20 TABLET ORAL 2 TIMES DAILY
Status: DISCONTINUED | OUTPATIENT
Start: 2025-01-21 | End: 2025-01-22

## 2025-01-21 RX ORDER — UBIDECARENONE 100 MG
100 CAPSULE ORAL
Status: DISCONTINUED | OUTPATIENT
Start: 2025-01-21 | End: 2025-01-21

## 2025-01-21 RX ORDER — ACETAMINOPHEN 325 MG/1
650 TABLET ORAL EVERY 6 HOURS PRN
Status: DISCONTINUED | OUTPATIENT
Start: 2025-01-21 | End: 2025-01-25 | Stop reason: HOSPADM

## 2025-01-21 RX ORDER — ONDANSETRON 4 MG/1
4 TABLET, ORALLY DISINTEGRATING ORAL EVERY 8 HOURS PRN
Status: DISCONTINUED | OUTPATIENT
Start: 2025-01-21 | End: 2025-01-25 | Stop reason: HOSPADM

## 2025-01-21 RX ORDER — SODIUM CHLORIDE 0.9 % (FLUSH) 0.9 %
5-40 SYRINGE (ML) INJECTION PRN
Status: DISCONTINUED | OUTPATIENT
Start: 2025-01-21 | End: 2025-01-25 | Stop reason: HOSPADM

## 2025-01-21 RX ORDER — LEVOTHYROXINE SODIUM 88 UG/1
88 TABLET ORAL DAILY
Status: DISCONTINUED | OUTPATIENT
Start: 2025-01-22 | End: 2025-01-25 | Stop reason: HOSPADM

## 2025-01-21 RX ADMIN — ASPIRIN 81 MG: 81 TABLET, COATED ORAL at 21:16

## 2025-01-21 RX ADMIN — SODIUM CHLORIDE, PRESERVATIVE FREE 10 ML: 5 INJECTION INTRAVENOUS at 21:17

## 2025-01-21 RX ADMIN — SODIUM CHLORIDE, POTASSIUM CHLORIDE, SODIUM LACTATE AND CALCIUM CHLORIDE: 600; 310; 30; 20 INJECTION, SOLUTION INTRAVENOUS at 16:53

## 2025-01-21 RX ADMIN — SODIUM CHLORIDE 1000 ML: 9 INJECTION, SOLUTION INTRAVENOUS at 11:29

## 2025-01-21 ASSESSMENT — ENCOUNTER SYMPTOMS
COLOR CHANGE: 0
VOMITING: 0
DIFFICULTY BREATHING: 0
RHINORRHEA: 0
COUGH: 0
SHORTNESS OF BREATH: 0
VISUAL CHANGE: 0
CONSTIPATION: 0
DIARRHEA: 0
NAUSEA: 0
BACK PAIN: 0
ABDOMINAL PAIN: 0
SORE THROAT: 0

## 2025-01-21 ASSESSMENT — PAIN - FUNCTIONAL ASSESSMENT: PAIN_FUNCTIONAL_ASSESSMENT: 0-10

## 2025-01-21 ASSESSMENT — PAIN SCALES - GENERAL
PAINLEVEL_OUTOF10: 0

## 2025-01-21 NOTE — ED PROVIDER NOTES
Emergency Department Provider Note       PCP: Zuleika Medrano MD   Age: 84 y.o.   Sex: female     DISPOSITION Decision To Admit 01/21/2025 02:25:37 PM    ICD-10-CM    1. Syncope and collapse  R55       2. Hyponatremia  E87.1           Medical Decision Making     Patient with syncope at home.  No preceding symptoms.  Feels well currently.  Has hyponatremia sodium 121.  Will replace with fluids and admit for further treatment evaluation     1 or more acute illnesses that pose a threat to life or bodily function.   Shared medical decision making was utilized in creating the patients health plan today.  I independently ordered and reviewed each unique test.    I reviewed external records: provider visit note from PCP.   The patients assessment required an independent historian: ems.  The reason they were needed is important historical information not provided by the patient.  ED cardiac monitoring rhythm strip was ordered and interpreted:  sinus rhythm, no evidence of an arrhythmia  ST Segments:Nonspecific ST segments - NO STEMI   Rate: 100  I interpreted the X-rays no infiltrate.  I interpreted the CT Scan no hemorrhage.  ED provider's independent EKG interpretation normal sinus rhythm rate 81.  Nonspecific ST changes.  The patient was admitted and I have discussed patient management with the admitting provider.          History     Patient was having some coffee at the table when she lost consciousness.  She did not fall but her  guided her to the ground.  He called EMS for possible cardiac arrest.  On arrival it appears more a syncopal episode.  When EMS got there she had woken back up and was alert and oriented.  They sat her up in a chair for about 4 5 minutes the patient was feeling well.  She then started to lose consciousness again.  They laid her back on the floor and she improved.  They have had difficulty getting her blood pressure.  Not a good reading.  Patient has no complaints or self.  Lying      Sexually Abused: Not asked   Housing Stability: Unknown (2/22/2024)    Housing Stability Vital Sign     Unstable Housing in the Last Year: No        Previous Medications    ASPIRIN 81 MG EC TABLET    Take 1 tablet by mouth 2 times daily    ATORVASTATIN (LIPITOR) 10 MG TABLET    Take 1 tablet by mouth every evening    CALCIUM CARBONATE-VITAMIN D (CALCIUM-VITAMIN D) 600-125 MG-UNIT TABS    Take 1 tablet by mouth Daily with lunch    COENZYME Q10 100 MG CAPS CAPSULE    Take 1 capsule by mouth Daily with lunch    DENOSUMAB (PROLIA) 60 MG/ML SOSY SC INJECTION    Inject 1 mL into the skin once for 1 dose    LEVOTHYROXINE (SYNTHROID) 88 MCG TABLET    Take 1/2 tab on Sunday and one all other days Indications: a condition with low thyroid hormone levels    LISINOPRIL (PRINIVIL;ZESTRIL) 20 MG TABLET    Take 1 tablet by mouth 2 times daily    MECLIZINE (ANTIVERT) 25 MG TABLET    Take 1 tablet by mouth 3 times daily as needed    MULTIPLE VITAMIN (MULTI-VITAMINS PO)    Take 1 tablet by mouth daily    POLYETHYLENE GLYCOL (GLYCOLAX) 17 GM/SCOOP POWDER    Take 17 g by mouth daily as needed    SERTRALINE (ZOLOFT) 50 MG TABLET    Take 1 tablet by mouth daily        Results from this emergency department visit:      Results for orders placed or performed during the hospital encounter of 01/21/25   XR CHEST PORTABLE    Narrative    Chest X-ray    INDICATION: sob    COMPARISON:  None    TECHNIQUE: AP view of the chest was obtained.      Impression    Findings/impression: Likely no acute cardiopulmonary finding on this portable  chest. Senescent changes of the thoracic aorta, shoulders and spine. Consider  dedicated PA/lateral chest when clinically feasible.      Electronically signed by Haseeb Gonzalez   CT HEAD WO CONTRAST    Narrative    Head CT    INDICATION: headache    TECHNIQUE: Multiple 2D axial images obtained through the brain without  intravenous contrast.  Radiation dose reduction techniques were used for this  study:  All

## 2025-01-21 NOTE — ACP (ADVANCE CARE PLANNING)
Advance Care Planning     Advance Care Planning Activator (Inpatient)  Conversation Note      Date of ACP Conversation: 1/21/2025     Conversation Conducted with: Patient with Decision Making Capacity    ACP Activator: Gris Perez, MSW        Health Care Decision Maker:     Current Designated Health Care Decision Maker:     Primary Decision Maker: Kamaljit Siddiqui III - Spouse - [phone]  Click here to complete Healthcare Decision Makers including section of the Healthcare Decision Maker Relationship (ie \"Primary\")  Today we documented Decision Maker(s) consistent with Legal Next of Kin hierarchy.

## 2025-01-21 NOTE — ED TRIAGE NOTES
Patient brought into ER via EMS coming from home. EMS states patient went unresponsive on spouse while eating breakfast. EMS states upon arrival was unable to get a blood pressure om patient. EMS states patient denies any pain at this time, EMS states patient had another unresponsive episode with EMS.     127/60 BP in ambulance.   20L AC

## 2025-01-21 NOTE — H&P
Hospitalist History and Physical   Admit Date:  2025 10:57 AM   Name:  Violet Siddiqui   Age:  84 y.o.  Sex:  female  :  1940   MRN:  [id number]   Room:  06/    Presenting/Chief Complaint: Loss of Consciousness     Reason(s) for Admission: No admission diagnoses are documented for this encounter.     History of Present Illness:   Violet Siddiqui is a 84 y.o. female with medical history of hypertension, hypothyroidism, hyperlipidemia, and type 2 diabetes mellitus.  Patient presenting secondary to episode of loss of consciousness.  Patient noted to have syncopal episode while drinking coffee today.  EMS was called and upon awakening patient she was alert and oriented and sat up in her chair for about 4 to 5 minutes stated she was feeling well.  Patient then again started to lose consciousness and at that time was brought to the emergency department.  Patient denies any preceding symptoms prior to these episodes.  She states she did have carotid stenosis noted in the past and given she was asymptomatic there was no plan for any interventions at that time.  Family would like patient to be seen by vascular surgery team and will place consult and also put for carotid ultrasound.  On my examination patient resting comfortably and denied any acute complaints.      Assessment & Plan:     Syncope  Likely related to vasovagal  CT head negative for any acute intracranial findings  Chest x-ray no acute cardiopulmonary disease noted  Will check carotid ultrasound given history of carotid stenosis in the past   will reach out to vascular surgery team for further evaluation per family request  Will check orthostatic vital signs  PT and OT ordered  Continue to monitor    Hyponatremia  Severely low at 121  Goal sodium for the next 24 hours would be 127-129  Patient received normal saline bolus while in the emergency department  Will place on LR 50 mL/h  Follow-up urine studies  Discontinue sertraline

## 2025-01-21 NOTE — CARE COORDINATION
SW met with patient who confirmed demographic information. Patient states that she lives with her  and has children who are local and supportive. Patient is insured and established with primary care. Patient uses a cane to ambulate when outside of her home, is independent in her ADLs, and endorses some falls due to balance which she's received therapy for. Patient anticipated to admit, awaiting hospitalist orders to determine observation vs. Inpatient status. CMI, Milestones, and ACP completed.        01/21/25 7138   Service Assessment   Patient Orientation Alert and Oriented;Person;Place;Situation;Self   Cognition Alert   History Provided By Patient   Primary Caregiver Self   Support Systems Spouse/Significant Other;Children;Family Members   PCP Verified by CM Yes   Prior Functional Level Independent in ADLs/IADLs   Current Functional Level Independent in ADLs/IADLs   Can patient return to prior living arrangement Yes   Ability to make needs known: Good   Family able to assist with home care needs: Yes   Would you like for me to discuss the discharge plan with any other family members/significant others, and if so, who? Yes  ( and Children)   Financial Resources Medicare   Community Resources None   Discharge Planning   Patient expects to be discharged to: Ant Perez LMSW  Medical Social Worker  [facility]

## 2025-01-22 LAB
ANION GAP SERPL CALC-SCNC: 10 MMOL/L (ref 7–16)
ANION GAP SERPL CALC-SCNC: 11 MMOL/L (ref 7–16)
ANION GAP SERPL CALC-SCNC: 8 MMOL/L (ref 7–16)
ANION GAP SERPL CALC-SCNC: 9 MMOL/L (ref 7–16)
BUN SERPL-MCNC: 11 MG/DL (ref 8–23)
BUN SERPL-MCNC: 12 MG/DL (ref 8–23)
BUN SERPL-MCNC: 13 MG/DL (ref 8–23)
BUN SERPL-MCNC: 14 MG/DL (ref 8–23)
CALCIUM SERPL-MCNC: 8.4 MG/DL (ref 8.8–10.2)
CALCIUM SERPL-MCNC: 8.5 MG/DL (ref 8.8–10.2)
CALCIUM SERPL-MCNC: 8.8 MG/DL (ref 8.8–10.2)
CALCIUM SERPL-MCNC: 8.9 MG/DL (ref 8.8–10.2)
CHLORIDE SERPL-SCNC: 88 MMOL/L (ref 98–107)
CHLORIDE SERPL-SCNC: 90 MMOL/L (ref 98–107)
CHLORIDE SERPL-SCNC: 91 MMOL/L (ref 98–107)
CHLORIDE SERPL-SCNC: 92 MMOL/L (ref 98–107)
CO2 SERPL-SCNC: 24 MMOL/L (ref 20–29)
CO2 SERPL-SCNC: 24 MMOL/L (ref 20–29)
CO2 SERPL-SCNC: 25 MMOL/L (ref 20–29)
CO2 SERPL-SCNC: 27 MMOL/L (ref 20–29)
CREAT SERPL-MCNC: 0.6 MG/DL (ref 0.6–1.1)
CREAT SERPL-MCNC: 0.68 MG/DL (ref 0.6–1.1)
CREAT SERPL-MCNC: 0.69 MG/DL (ref 0.6–1.1)
CREAT SERPL-MCNC: 0.74 MG/DL (ref 0.6–1.1)
EST. AVERAGE GLUCOSE BLD GHB EST-MCNC: 132 MG/DL
GLUCOSE BLD STRIP.AUTO-MCNC: 107 MG/DL (ref 65–100)
GLUCOSE BLD STRIP.AUTO-MCNC: 108 MG/DL (ref 65–100)
GLUCOSE BLD STRIP.AUTO-MCNC: 94 MG/DL (ref 65–100)
GLUCOSE BLD STRIP.AUTO-MCNC: 96 MG/DL (ref 65–100)
GLUCOSE SERPL-MCNC: 107 MG/DL (ref 70–99)
GLUCOSE SERPL-MCNC: 112 MG/DL (ref 70–99)
GLUCOSE SERPL-MCNC: 83 MG/DL (ref 70–99)
GLUCOSE SERPL-MCNC: 97 MG/DL (ref 70–99)
HBA1C MFR BLD: 6.2 % (ref 0–5.6)
POTASSIUM SERPL-SCNC: 4.1 MMOL/L (ref 3.5–5.1)
POTASSIUM SERPL-SCNC: 4.2 MMOL/L (ref 3.5–5.1)
POTASSIUM SERPL-SCNC: 4.2 MMOL/L (ref 3.5–5.1)
POTASSIUM SERPL-SCNC: 4.3 MMOL/L (ref 3.5–5.1)
SERVICE CMNT-IMP: ABNORMAL
SERVICE CMNT-IMP: ABNORMAL
SERVICE CMNT-IMP: NORMAL
SERVICE CMNT-IMP: NORMAL
SODIUM SERPL-SCNC: 122 MMOL/L (ref 136–145)
SODIUM SERPL-SCNC: 124 MMOL/L (ref 136–145)
SODIUM SERPL-SCNC: 126 MMOL/L (ref 136–145)
SODIUM SERPL-SCNC: 127 MMOL/L (ref 136–145)

## 2025-01-22 PROCEDURE — 2000000000 HC ICU R&B

## 2025-01-22 PROCEDURE — 6360000002 HC RX W HCPCS: Performed by: INTERNAL MEDICINE

## 2025-01-22 PROCEDURE — 82962 GLUCOSE BLOOD TEST: CPT

## 2025-01-22 PROCEDURE — 80048 BASIC METABOLIC PNL TOTAL CA: CPT

## 2025-01-22 PROCEDURE — 93880 EXTRACRANIAL BILAT STUDY: CPT | Performed by: RADIOLOGY

## 2025-01-22 PROCEDURE — 36415 COLL VENOUS BLD VENIPUNCTURE: CPT

## 2025-01-22 PROCEDURE — 2500000003 HC RX 250 WO HCPCS: Performed by: INTERNAL MEDICINE

## 2025-01-22 PROCEDURE — 2700000000 HC OXYGEN THERAPY PER DAY

## 2025-01-22 PROCEDURE — 99221 1ST HOSP IP/OBS SF/LOW 40: CPT | Performed by: SURGERY

## 2025-01-22 PROCEDURE — 6370000000 HC RX 637 (ALT 250 FOR IP): Performed by: INTERNAL MEDICINE

## 2025-01-22 PROCEDURE — 83036 HEMOGLOBIN GLYCOSYLATED A1C: CPT

## 2025-01-22 RX ORDER — MAGNESIUM HYDROXIDE/ALUMINUM HYDROXICE/SIMETHICONE 120; 1200; 1200 MG/30ML; MG/30ML; MG/30ML
30 SUSPENSION ORAL EVERY 6 HOURS PRN
Status: DISCONTINUED | OUTPATIENT
Start: 2025-01-22 | End: 2025-01-25 | Stop reason: HOSPADM

## 2025-01-22 RX ORDER — ATORVASTATIN CALCIUM 10 MG/1
10 TABLET, FILM COATED ORAL EVERY EVENING
Status: DISCONTINUED | OUTPATIENT
Start: 2025-01-22 | End: 2025-01-25 | Stop reason: HOSPADM

## 2025-01-22 RX ORDER — LISINOPRIL 5 MG/1
5 TABLET ORAL 2 TIMES DAILY
Status: DISCONTINUED | OUTPATIENT
Start: 2025-01-22 | End: 2025-01-23

## 2025-01-22 RX ADMIN — ENOXAPARIN SODIUM 40 MG: 100 INJECTION SUBCUTANEOUS at 07:59

## 2025-01-22 RX ADMIN — ASPIRIN 81 MG: 81 TABLET, COATED ORAL at 20:04

## 2025-01-22 RX ADMIN — LEVOTHYROXINE SODIUM 88 MCG: 0.09 TABLET ORAL at 06:24

## 2025-01-22 RX ADMIN — ONDANSETRON 4 MG: 2 INJECTION, SOLUTION INTRAMUSCULAR; INTRAVENOUS at 02:54

## 2025-01-22 RX ADMIN — ASPIRIN 81 MG: 81 TABLET, COATED ORAL at 07:59

## 2025-01-22 RX ADMIN — SODIUM CHLORIDE, PRESERVATIVE FREE 10 ML: 5 INJECTION INTRAVENOUS at 07:59

## 2025-01-22 RX ADMIN — SODIUM CHLORIDE, PRESERVATIVE FREE 10 ML: 5 INJECTION INTRAVENOUS at 20:05

## 2025-01-22 RX ADMIN — ATORVASTATIN CALCIUM 10 MG: 10 TABLET, FILM COATED ORAL at 20:04

## 2025-01-22 ASSESSMENT — PAIN SCALES - GENERAL
PAINLEVEL_OUTOF10: 0

## 2025-01-22 NOTE — PROGRESS NOTES
TRANSFER - IN REPORT:    Verbal report received from Jillian GARZA on Violet Siddiqui  being received from ED for routine progression of patient care      Report consisted of patient's Situation, Background, Assessment and   Recommendations(SBAR).     Information from the following report(s) ED Encounter Summary, Adult Overview, Intake/Output, MAR, and Recent Results was reviewed with the receiving nurse.    Opportunity for questions and clarification was provided.      Assessment completed upon patient's arrival to unit and care assumed.

## 2025-01-22 NOTE — PROGRESS NOTES
Performed by: Tony    Basic Metabolic Panel    Collection Time: 01/21/25  6:50 PM   Result Value Ref Range    Sodium 120 (L) 136 - 145 mmol/L    Potassium 4.8 3.5 - 5.1 mmol/L    Chloride 88 (L) 98 - 107 mmol/L    CO2 23 20 - 29 mmol/L    Anion Gap 9 7 - 16 mmol/L    Glucose 142 (H) 70 - 99 mg/dL    BUN 10 8 - 23 MG/DL    Creatinine 0.64 0.60 - 1.10 MG/DL    Est, Glom Filt Rate 87 >60 ml/min/1.73m2    Calcium 7.9 (L) 8.8 - 10.2 MG/DL   POCT Glucose    Collection Time: 01/21/25  9:16 PM   Result Value Ref Range    POC Glucose 153 (H) 65 - 100 mg/dL    Performed by: Marylu    Basic Metabolic Panel    Collection Time: 01/22/25 12:43 AM   Result Value Ref Range    Sodium 122 (L) 136 - 145 mmol/L    Potassium 4.2 3.5 - 5.1 mmol/L    Chloride 88 (L) 98 - 107 mmol/L    CO2 25 20 - 29 mmol/L    Anion Gap 9 7 - 16 mmol/L    Glucose 97 70 - 99 mg/dL    BUN 12 8 - 23 MG/DL    Creatinine 0.69 0.60 - 1.10 MG/DL    Est, Glom Filt Rate 86 >60 ml/min/1.73m2    Calcium 8.4 (L) 8.8 - 10.2 MG/DL   Hemoglobin A1c    Collection Time: 01/22/25  6:32 AM   Result Value Ref Range    Hemoglobin A1C 6.2 (H) 0 - 5.6 %    Estimated Avg Glucose 132 mg/dL   Basic Metabolic Panel    Collection Time: 01/22/25  6:32 AM   Result Value Ref Range    Sodium 124 (L) 136 - 145 mmol/L    Potassium 4.2 3.5 - 5.1 mmol/L    Chloride 90 (L) 98 - 107 mmol/L    CO2 24 20 - 29 mmol/L    Anion Gap 10 7 - 16 mmol/L    Glucose 112 (H) 70 - 99 mg/dL    BUN 11 8 - 23 MG/DL    Creatinine 0.60 0.60 - 1.10 MG/DL    Est, Glom Filt Rate 88 >60 ml/min/1.73m2    Calcium 8.5 (L) 8.8 - 10.2 MG/DL   POCT Glucose    Collection Time: 01/22/25  7:36 AM   Result Value Ref Range    POC Glucose 108 (H) 65 - 100 mg/dL    Performed by: Paula        No results for input(s): \"COVID19\" in the last 72 hours.    Current Meds:  Current Facility-Administered Medications   Medication Dose Route Frequency    aluminum & magnesium hydroxide-simethicone (MAALOX)

## 2025-01-22 NOTE — PROGRESS NOTES
4 Eyes Skin Assessment     NAME:  Violet Siddiqui  YOB: 1940  MEDICAL RECORD NUMBER:  [id number]    The patient is being assessed for  Admission    I agree that at least one RN has performed a thorough Head to Toe Skin Assessment on the patient. ALL assessment sites listed below have been assessed.      Areas assessed by both nurses:    Head, Face, Ears, Shoulders, Back, Chest, Arms, Elbows, Hands, Sacrum. Buttock, Coccyx, Ischium, and Legs. Feet and Heels   Skin intact.Toes on both feet with redness. Sacral/coccyx pink and blanchable. Allevyn place.     Does the Patient have a Wound? No noted wound(s)       Boom Prevention initiated by RN: Yes  Wound Care Orders initiated by RN: No    Pressure Injury (Stage 3,4, Unstageable, DTI, NWPT, and Complex wounds) if present, place Wound referral order by RN under : No    New Ostomies, if present place, Ostomy referral order under : No     Nurse 1 eSignature: Electronically signed by Darlene Orantes RN on 1/21/25 at 10:06 PM EST    **SHARE this note so that the co-signing nurse can place an eSignature**    Nurse 2 eSignature: Electronically signed by RAMBO HENRIQUEZ RN on 1/21/25 at 10:07 PM EST

## 2025-01-22 NOTE — PLAN OF CARE
Problem: Chronic Conditions and Co-morbidities  Goal: Patient's chronic conditions and co-morbidity symptoms are monitored and maintained or improved  1/22/2025 0941 by Gloria Navas, RN  Outcome: Progressing  Flowsheets (Taken 1/22/2025 0734)  Care Plan - Patient's Chronic Conditions and Co-Morbidity Symptoms are Monitored and Maintained or Improved:   Monitor and assess patient's chronic conditions and comorbid symptoms for stability, deterioration, or improvement   Collaborate with multidisciplinary team to address chronic and comorbid conditions and prevent exacerbation or deterioration   Update acute care plan with appropriate goals if chronic or comorbid symptoms are exacerbated and prevent overall improvement and discharge  1/21/2025 2155 by Darlene Mayfield RN  Outcome: Progressing  Flowsheets (Taken 1/21/2025 2039)  Care Plan - Patient's Chronic Conditions and Co-Morbidity Symptoms are Monitored and Maintained or Improved:   Monitor and assess patient's chronic conditions and comorbid symptoms for stability, deterioration, or improvement   Collaborate with multidisciplinary team to address chronic and comorbid conditions and prevent exacerbation or deterioration   Update acute care plan with appropriate goals if chronic or comorbid symptoms are exacerbated and prevent overall improvement and discharge     Problem: Discharge Planning  Goal: Discharge to home or other facility with appropriate resources  1/22/2025 0941 by Gloria Navas, RN  Outcome: Progressing  Flowsheets (Taken 1/22/2025 0734)  Discharge to home or other facility with appropriate resources:   Identify barriers to discharge with patient and caregiver   Arrange for needed discharge resources and transportation as appropriate   Identify discharge learning needs (meds, wound care, etc)   Arrange for interpreters to assist at discharge as needed   Refer to discharge planning if patient needs post-hospital services based on physician order

## 2025-01-22 NOTE — ED NOTES
TRANSFER - OUT REPORT:    Verbal report given to KAYLA Colon on Violet Siddiqui  being transferred to [facility] for routine progression of patient care       Report consisted of patient's Situation, Background, Assessment and   Recommendations(SBAR).     Information from the following report(s) ED SBAR was reviewed with the receiving nurse.           Lines:   Peripheral IV 01/21/25 Left Antecubital (Active)       Peripheral IV 01/21/25 Right Antecubital (Active)        Opportunity for questions and clarification was provided.      Patient transported with:  Registered Nurse

## 2025-01-22 NOTE — PROGRESS NOTES
Critical Care Interdisciplinary Rounds with staff.     Shani Cao M.Div, [facility] / / Bereavement Coordinator  [facility] Care Department   c: [phone]/ [phone] / [email or web address]  [email or web address]

## 2025-01-22 NOTE — CONSULTS
[address] [place] [id number]  [phone] FAX: [phone]    Violet Siddiqui  1940    Chief Complaint   Patient presents with    Loss of Consciousness           HPI   Ms. Violet Siddiqui is a 84 y.o. year old female who well-known to me with a symptomatic left carotid greater than 70%.    Current Facility-Administered Medications   Medication Dose Route Frequency Provider Last Rate Last Admin    aluminum & magnesium hydroxide-simethicone (MAALOX) 200-200-20 MG/5ML suspension 30 mL  30 mL Oral Q6H PRN Juan Pablo Eldridge MD        aspirin EC tablet 81 mg  81 mg Oral BID Albin Lepe MD   81 mg at 01/21/25 2116    atorvastatin (LIPITOR) tablet 10 mg  10 mg Oral QPM Albin Lepe MD        levothyroxine (SYNTHROID) tablet 88 mcg  88 mcg Oral Daily Albin Lepe MD   88 mcg at 01/22/25 0624    meclizine (ANTIVERT) tablet 25 mg  25 mg Oral TID PRN Albin Lepe MD        [Held by provider] lisinopril (PRINIVIL;ZESTRIL) tablet 20 mg  20 mg Oral BID Albin Lepe MD        sodium chloride flush 0.9 % injection 5-40 mL  5-40 mL IntraVENous 2 times per day Albin Lepe MD   10 mL at 01/21/25 2117    sodium chloride flush 0.9 % injection 5-40 mL  5-40 mL IntraVENous PRN Albin Lepe MD        0.9 % sodium chloride infusion   IntraVENous PRN Albin Lepe MD        potassium chloride (KLOR-CON M) extended release tablet 40 mEq  40 mEq Oral PRN Albin Lepe MD        Or    potassium bicarb-citric acid (EFFER-K) effervescent tablet 40 mEq  40 mEq Oral PRN Albin Lepe MD        Or    potassium chloride 10 mEq/100 mL IVPB (Peripheral Line)  10 mEq IntraVENous PRN Albin Lepe MD        magnesium sulfate 2000 mg in 50 mL IVPB premix  2,000 mg IntraVENous PRN Albin Lepe MD        enoxaparin (LOVENOX) injection

## 2025-01-22 NOTE — PLAN OF CARE
Problem: Chronic Conditions and Co-morbidities  Goal: Patient's chronic conditions and co-morbidity symptoms are monitored and maintained or improved  Outcome: Progressing  Flowsheets (Taken 1/21/2025 2039)  Care Plan - Patient's Chronic Conditions and Co-Morbidity Symptoms are Monitored and Maintained or Improved:   Monitor and assess patient's chronic conditions and comorbid symptoms for stability, deterioration, or improvement   Collaborate with multidisciplinary team to address chronic and comorbid conditions and prevent exacerbation or deterioration   Update acute care plan with appropriate goals if chronic or comorbid symptoms are exacerbated and prevent overall improvement and discharge     Problem: Discharge Planning  Goal: Discharge to home or other facility with appropriate resources  Outcome: Progressing  Flowsheets  Taken 1/21/2025 2039 by Darlene Mayfield RN  Discharge to home or other facility with appropriate resources: Identify barriers to discharge with patient and caregiver  Taken 1/21/2025 1453 by Alexus Acevedo RN  Discharge to home or other facility with appropriate resources:   Identify barriers to discharge with patient and caregiver   Arrange for needed discharge resources and transportation as appropriate   Identify discharge learning needs (meds, wound care, etc)   Refer to discharge planning if patient needs post-hospital services based on physician order or complex needs related to functional status, cognitive ability or social support system     Problem: Pain  Goal: Verbalizes/displays adequate comfort level or baseline comfort level  Outcome: Progressing  Flowsheets (Taken 1/21/2025 2039)  Verbalizes/displays adequate comfort level or baseline comfort level:   Encourage patient to monitor pain and request assistance   Assess pain using appropriate pain scale   Administer analgesics based on type and severity of pain and evaluate response     Problem: Safety - Adult  Goal:

## 2025-01-23 LAB
ANION GAP SERPL CALC-SCNC: 12 MMOL/L (ref 7–16)
ANION GAP SERPL CALC-SCNC: 8 MMOL/L (ref 7–16)
BUN SERPL-MCNC: 11 MG/DL (ref 8–23)
BUN SERPL-MCNC: 13 MG/DL (ref 8–23)
CALCIUM SERPL-MCNC: 8.7 MG/DL (ref 8.8–10.2)
CALCIUM SERPL-MCNC: 8.7 MG/DL (ref 8.8–10.2)
CHLORIDE SERPL-SCNC: 91 MMOL/L (ref 98–107)
CHLORIDE SERPL-SCNC: 91 MMOL/L (ref 98–107)
CO2 SERPL-SCNC: 23 MMOL/L (ref 20–29)
CO2 SERPL-SCNC: 26 MMOL/L (ref 20–29)
CREAT SERPL-MCNC: 0.69 MG/DL (ref 0.6–1.1)
CREAT SERPL-MCNC: 0.87 MG/DL (ref 0.6–1.1)
GLUCOSE BLD STRIP.AUTO-MCNC: 101 MG/DL (ref 65–100)
GLUCOSE BLD STRIP.AUTO-MCNC: 153 MG/DL (ref 65–100)
GLUCOSE BLD STRIP.AUTO-MCNC: 90 MG/DL (ref 65–100)
GLUCOSE BLD STRIP.AUTO-MCNC: 96 MG/DL (ref 65–100)
GLUCOSE SERPL-MCNC: 104 MG/DL (ref 70–99)
GLUCOSE SERPL-MCNC: 129 MG/DL (ref 70–99)
POTASSIUM SERPL-SCNC: 4.2 MMOL/L (ref 3.5–5.1)
POTASSIUM SERPL-SCNC: 4.3 MMOL/L (ref 3.5–5.1)
SERVICE CMNT-IMP: ABNORMAL
SERVICE CMNT-IMP: ABNORMAL
SERVICE CMNT-IMP: NORMAL
SERVICE CMNT-IMP: NORMAL
SODIUM SERPL-SCNC: 125 MMOL/L (ref 136–145)
SODIUM SERPL-SCNC: 126 MMOL/L (ref 136–145)

## 2025-01-23 PROCEDURE — 80048 BASIC METABOLIC PNL TOTAL CA: CPT

## 2025-01-23 PROCEDURE — 6360000002 HC RX W HCPCS: Performed by: INTERNAL MEDICINE

## 2025-01-23 PROCEDURE — 97161 PT EVAL LOW COMPLEX 20 MIN: CPT

## 2025-01-23 PROCEDURE — 1100000000 HC RM PRIVATE

## 2025-01-23 PROCEDURE — 36415 COLL VENOUS BLD VENIPUNCTURE: CPT

## 2025-01-23 PROCEDURE — 6370000000 HC RX 637 (ALT 250 FOR IP): Performed by: INTERNAL MEDICINE

## 2025-01-23 PROCEDURE — 97530 THERAPEUTIC ACTIVITIES: CPT

## 2025-01-23 PROCEDURE — 82962 GLUCOSE BLOOD TEST: CPT

## 2025-01-23 PROCEDURE — 2580000003 HC RX 258: Performed by: INTERNAL MEDICINE

## 2025-01-23 RX ORDER — SODIUM CHLORIDE 1 G/1
1 TABLET ORAL
Status: DISCONTINUED | OUTPATIENT
Start: 2025-01-23 | End: 2025-01-24

## 2025-01-23 RX ORDER — LISINOPRIL 5 MG/1
10 TABLET ORAL 2 TIMES DAILY
Status: DISCONTINUED | OUTPATIENT
Start: 2025-01-23 | End: 2025-01-24

## 2025-01-23 RX ORDER — SODIUM CHLORIDE, SODIUM LACTATE, POTASSIUM CHLORIDE, CALCIUM CHLORIDE 600; 310; 30; 20 MG/100ML; MG/100ML; MG/100ML; MG/100ML
INJECTION, SOLUTION INTRAVENOUS CONTINUOUS
Status: DISCONTINUED | OUTPATIENT
Start: 2025-01-23 | End: 2025-01-23

## 2025-01-23 RX ADMIN — ATORVASTATIN CALCIUM 10 MG: 10 TABLET, FILM COATED ORAL at 19:33

## 2025-01-23 RX ADMIN — SODIUM CHLORIDE, POTASSIUM CHLORIDE, SODIUM LACTATE AND CALCIUM CHLORIDE: 600; 310; 30; 20 INJECTION, SOLUTION INTRAVENOUS at 10:07

## 2025-01-23 RX ADMIN — ASPIRIN 81 MG: 81 TABLET, COATED ORAL at 19:46

## 2025-01-23 RX ADMIN — LISINOPRIL 10 MG: 5 TABLET ORAL at 19:46

## 2025-01-23 RX ADMIN — Medication 1 G: at 16:12

## 2025-01-23 RX ADMIN — ENOXAPARIN SODIUM 40 MG: 100 INJECTION SUBCUTANEOUS at 07:35

## 2025-01-23 RX ADMIN — ASPIRIN 81 MG: 81 TABLET, COATED ORAL at 07:35

## 2025-01-23 RX ADMIN — LEVOTHYROXINE SODIUM 88 MCG: 0.09 TABLET ORAL at 07:35

## 2025-01-23 ASSESSMENT — PAIN SCALES - GENERAL
PAINLEVEL_OUTOF10: 0
PAINLEVEL_OUTOF10: 0

## 2025-01-23 NOTE — PROGRESS NOTES
Hospitalist Progress Note   Admit Date:  2025 10:57 AM   Name:  Violet Siddiqui   Age:  84 y.o.  Sex:  female  :  1940   MRN:  [id number]   Room:  [facility]/01    Presenting/Chief Complaint: Loss of Consciousness     Reason(s) for Admission: Syncope and collapse [R55]  Hyponatremia [E87.1]     Hospital Course:   Violet Siddiqui is a 84 y.o. female with medical history of hypertension, hypothyroidism, hyperlipidemia, and type 2 diabetes mellitus.  Patient came to emergency department secondary to loss of consciousness.  Patient has had duplex ultrasound showing greater than 70% stenosis but symptoms likely related to electrolyte abnormalities versus vasovagal.  Vascular surgery without any acute interventions at this time and recommend outpatient follow-up.  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      Subjective & 24hr Events:   Patient resting comfortably on examination.  Patient had no overnight events or complaints on exam.  Patient is eager to go home but discussed with her given her sodium need to wait.      Assessment & Plan:     Syncope  Likely related to electrolyte abnormalities versus vasovagal  CT head negative for any acute intracranial findings  Chest x-ray no acute cardiopulmonary disease noted  Carotid ultrasound again noting greater than 70% stenosis  Vascular surgery did have discussion with family and no plans for any interventions at this time and recommend outpatient follow-up  PT and OT ordered  Continue to monitor     Hyponatremia  Sodium minimally improved  Goal sodium for the next 24 hours would be 131-133  Will place on fluid restriction given urine studies suggestive of SIADH  Discontinue sertraline as this may be triggering agent  Fluid restrict 1500 mL  Will place on sodium tablets  Continue to monitor     Type 2 diabetes mellitus  Hold oral antihyperglycemic's  Correctional scale insulin  Continue to  monitor and adjust as needed     Hypothyroidism  Continue Synthroid  TSH normal     Hyperlipidemia  Continue statin     Hypertension  Blood pressure slightly elevated at this time  Restart home lisinopril  Continue to monitor and adjust as needed      Anticipated Discharge Arrangements:   Therapy has not evaluated yet    PT/OT evals ordered?  Therapy evals ordered  Diet:  ADULT DIET; Regular; 1500 ml  VTE prophylaxis: Lovenox  Code status: Full Code      Non-peripheral Lines and Tubes (if present):              Telemetry (if present):  Cardiac/Telemetry Monitor On: Bedside monitor in use        Hospital Problems:  Principal Problem:    Hyponatremia  Active Problems:    Primary hypertension    Type 2 diabetes mellitus with peripheral vascular disease (HCC)    Hypothyroidism    Hyperlipidemia  Resolved Problems:    * No resolved hospital problems. *      Objective:   Patient Vitals for the past 24 hrs:   Temp Pulse Resp BP SpO2   01/23/25 1000 -- 94 -- -- 95 %   01/23/25 0807 97.4 °F (36.3 °C) -- -- -- --   01/23/25 0700 -- 90 17 (!) 165/76 96 %   01/23/25 0620 -- 91 22 (!) 154/75 92 %   01/23/25 0500 97.4 °F (36.3 °C) 96 18 (!) 175/90 --   01/23/25 0400 -- 88 28 (!) 164/78 --   01/23/25 0300 -- 96 23 (!) 142/67 93 %   01/23/25 0200 -- 96 19 135/74 95 %   01/23/25 0100 -- (!) 107 20 (!) 142/91 96 %   01/23/25 0000 -- 98 16 (!) 122/106 99 %   01/22/25 2355 -- -- -- -- 95 %   01/22/25 2352 -- -- -- -- (!) 84 %   01/22/25 2300 97.5 °F (36.4 °C) (!) 106 22 (!) 146/96 91 %   01/22/25 2200 -- 98 20 (!) 156/77 99 %   01/22/25 2100 -- (!) 102 17 (!) 142/70 96 %   01/22/25 2000 -- 99 25 (!) 165/75 98 %   01/22/25 1948 97.2 °F (36.2 °C) (!) 102 12 (!) 186/97 98 %   01/22/25 1800 -- (!) 104 14 (!) 160/77 97 %   01/22/25 1700 -- (!) 107 14 (!) 171/95 96 %   01/22/25 1600 -- (!) 104 13 (!) 173/106 97 %   01/22/25 1500 97.4 °F (36.3 °C) 100 16 (!) 149/91 93 %   01/22/25 1449 97.4 °F (36.3 °C) (!) 101 16 -- 95 %       Oxygen

## 2025-01-23 NOTE — PROGRESS NOTES
ACUTE PHYSICAL THERAPY GOALS:   (Developed with and agreed upon by patient and/or caregiver.)     LTG:  (1.)Ms. Siddiqui will move from supine to sit and sit to supine  in bed with INDEPENDENT within 5 treatment day(s).    (2.)Ms. Siddiqui will transfer from bed to chair and chair to bed with SUPERVISION using the least restrictive device within 5 treatment day(s).    (3.)Ms. Siddiqui will ambulate with SUPERVISION for 200 feet with the least restrictive device within 5 treatment day(s).  4.  Ms. Siddiqui will ambulate up/down 1 step with the least restrictive device and supervision within 5 treatment days.    ________________________________________________________________________________________________      PHYSICAL THERAPY Initial Assessment  (Link to Caseload Tracking: PT Visit Days : 1  Acknowledge Orders  Time In/Out  PT Charge Capture  Rehab Caseload Tracker    Violet Siddiqui is a 84 y.o. female   PRIMARY DIAGNOSIS: Hyponatremia  Syncope and collapse [R55]  Hyponatremia [E87.1]       Reason for Referral: Generalized Muscle Weakness (M62.81)  Inpatient: Payor: MEDICARE / Plan: MEDICARE PART A AND B / Product Type: *No Product type* /     ASSESSMENT:     REHAB RECOMMENDATIONS:   Recommendation to date pending progress:  Setting:  No further skilled physical therapy after discharge from hospital    Equipment:    None     ASSESSMENT:  Ms. Siddiqui admitted with above diagnoses.  Patient was brought to the ER after a syncopal episode at home while eating breakfast.  Patient is independent/modified independent at base.  She occasionally uses a cane but has a ROLLING WALKER as well.  She has a history of R hip fracture (?hemiarthroplasty) that continues to give her some trouble.  Patient will be followed by therapy during admission to ensure continued mobility and ability to return home at d/c.  No needs are anticipated at d/c.  Patient did well with today's session.  She moved well with the ROLLING WALKER  General

## 2025-01-23 NOTE — PLAN OF CARE
Problem: Chronic Conditions and Co-morbidities  Goal: Patient's chronic conditions and co-morbidity symptoms are monitored and maintained or improved  Outcome: Progressing  Flowsheets (Taken 1/22/2025 1948)  Care Plan - Patient's Chronic Conditions and Co-Morbidity Symptoms are Monitored and Maintained or Improved:   Collaborate with multidisciplinary team to address chronic and comorbid conditions and prevent exacerbation or deterioration   Update acute care plan with appropriate goals if chronic or comorbid symptoms are exacerbated and prevent overall improvement and discharge   Monitor and assess patient's chronic conditions and comorbid symptoms for stability, deterioration, or improvement     Problem: Discharge Planning  Goal: Discharge to home or other facility with appropriate resources  Outcome: Progressing  Flowsheets (Taken 1/22/2025 1948)  Discharge to home or other facility with appropriate resources:   Identify barriers to discharge with patient and caregiver   Arrange for needed discharge resources and transportation as appropriate   Identify discharge learning needs (meds, wound care, etc)   Refer to discharge planning if patient needs post-hospital services based on physician order or complex needs related to functional status, cognitive ability or social support system     Problem: Pain  Goal: Verbalizes/displays adequate comfort level or baseline comfort level  Outcome: Progressing  Flowsheets (Taken 1/22/2025 1948)  Verbalizes/displays adequate comfort level or baseline comfort level:   Encourage patient to monitor pain and request assistance   Assess pain using appropriate pain scale   Administer analgesics based on type and severity of pain and evaluate response   Implement non-pharmacological measures as appropriate and evaluate response   Consider cultural and social influences on pain and pain management     Problem: Safety - Adult  Goal: Free from fall injury  Outcome: Progressing

## 2025-01-23 NOTE — PLAN OF CARE
Problem: Chronic Conditions and Co-morbidities  Goal: Patient's chronic conditions and co-morbidity symptoms are monitored and maintained or improved  1/23/2025 1052 by Dell Kumar RN  Outcome: Progressing  1/23/2025 0606 by Alexus Acevedo RN  Outcome: Progressing  Flowsheets (Taken 1/22/2025 1948)  Care Plan - Patient's Chronic Conditions and Co-Morbidity Symptoms are Monitored and Maintained or Improved:   Collaborate with multidisciplinary team to address chronic and comorbid conditions and prevent exacerbation or deterioration   Update acute care plan with appropriate goals if chronic or comorbid symptoms are exacerbated and prevent overall improvement and discharge   Monitor and assess patient's chronic conditions and comorbid symptoms for stability, deterioration, or improvement     Problem: Discharge Planning  Goal: Discharge to home or other facility with appropriate resources  1/23/2025 1052 by Dell Kumar RN  Outcome: Progressing  1/23/2025 0606 by Alexus Acevedo RN  Outcome: Progressing  Flowsheets (Taken 1/22/2025 1948)  Discharge to home or other facility with appropriate resources:   Identify barriers to discharge with patient and caregiver   Arrange for needed discharge resources and transportation as appropriate   Identify discharge learning needs (meds, wound care, etc)   Refer to discharge planning if patient needs post-hospital services based on physician order or complex needs related to functional status, cognitive ability or social support system     Problem: Pain  Goal: Verbalizes/displays adequate comfort level or baseline comfort level  1/23/2025 1052 by Dell Kumar RN  Outcome: Progressing  1/23/2025 0606 by Alexus Acevedo RN  Outcome: Progressing  Flowsheets (Taken 1/22/2025 1948)  Verbalizes/displays adequate comfort level or baseline comfort level:   Encourage patient to monitor pain and request assistance   Assess pain using appropriate pain

## 2025-01-24 LAB
ANION GAP SERPL CALC-SCNC: 10 MMOL/L (ref 7–16)
ANION GAP SERPL CALC-SCNC: 8 MMOL/L (ref 7–16)
ANION GAP SERPL CALC-SCNC: 8 MMOL/L (ref 7–16)
BUN SERPL-MCNC: 14 MG/DL (ref 8–23)
BUN SERPL-MCNC: 20 MG/DL (ref 8–23)
BUN SERPL-MCNC: 21 MG/DL (ref 8–23)
CALCIUM SERPL-MCNC: 8.5 MG/DL (ref 8.8–10.2)
CALCIUM SERPL-MCNC: 8.8 MG/DL (ref 8.8–10.2)
CALCIUM SERPL-MCNC: 9.1 MG/DL (ref 8.8–10.2)
CHLORIDE SERPL-SCNC: 89 MMOL/L (ref 98–107)
CHLORIDE SERPL-SCNC: 90 MMOL/L (ref 98–107)
CHLORIDE SERPL-SCNC: 91 MMOL/L (ref 98–107)
CO2 SERPL-SCNC: 26 MMOL/L (ref 20–29)
CO2 SERPL-SCNC: 27 MMOL/L (ref 20–29)
CO2 SERPL-SCNC: 28 MMOL/L (ref 20–29)
CREAT SERPL-MCNC: 0.69 MG/DL (ref 0.6–1.1)
CREAT SERPL-MCNC: 0.72 MG/DL (ref 0.6–1.1)
CREAT SERPL-MCNC: 0.76 MG/DL (ref 0.6–1.1)
GLUCOSE BLD STRIP.AUTO-MCNC: 103 MG/DL (ref 65–100)
GLUCOSE BLD STRIP.AUTO-MCNC: 111 MG/DL (ref 65–100)
GLUCOSE BLD STRIP.AUTO-MCNC: 129 MG/DL (ref 65–100)
GLUCOSE BLD STRIP.AUTO-MCNC: 96 MG/DL (ref 65–100)
GLUCOSE SERPL-MCNC: 101 MG/DL (ref 70–99)
GLUCOSE SERPL-MCNC: 113 MG/DL (ref 70–99)
GLUCOSE SERPL-MCNC: 117 MG/DL (ref 70–99)
POTASSIUM SERPL-SCNC: 3.9 MMOL/L (ref 3.5–5.1)
POTASSIUM SERPL-SCNC: 4 MMOL/L (ref 3.5–5.1)
POTASSIUM SERPL-SCNC: 4 MMOL/L (ref 3.5–5.1)
SERVICE CMNT-IMP: ABNORMAL
SERVICE CMNT-IMP: NORMAL
SODIUM SERPL-SCNC: 125 MMOL/L (ref 136–145)
SODIUM SERPL-SCNC: 126 MMOL/L (ref 136–145)
SODIUM SERPL-SCNC: 126 MMOL/L (ref 136–145)
URATE SERPL-MCNC: 3 MG/DL (ref 2.5–7.1)

## 2025-01-24 PROCEDURE — 1100000000 HC RM PRIVATE

## 2025-01-24 PROCEDURE — 6370000000 HC RX 637 (ALT 250 FOR IP): Performed by: INTERNAL MEDICINE

## 2025-01-24 PROCEDURE — 84550 ASSAY OF BLOOD/URIC ACID: CPT

## 2025-01-24 PROCEDURE — 97530 THERAPEUTIC ACTIVITIES: CPT

## 2025-01-24 PROCEDURE — 6360000002 HC RX W HCPCS: Performed by: INTERNAL MEDICINE

## 2025-01-24 PROCEDURE — 2500000003 HC RX 250 WO HCPCS: Performed by: INTERNAL MEDICINE

## 2025-01-24 PROCEDURE — 97165 OT EVAL LOW COMPLEX 30 MIN: CPT

## 2025-01-24 PROCEDURE — 82533 TOTAL CORTISOL: CPT

## 2025-01-24 PROCEDURE — 82962 GLUCOSE BLOOD TEST: CPT

## 2025-01-24 PROCEDURE — 80048 BASIC METABOLIC PNL TOTAL CA: CPT

## 2025-01-24 PROCEDURE — 97535 SELF CARE MNGMENT TRAINING: CPT

## 2025-01-24 RX ORDER — SODIUM CHLORIDE 1 G/1
2 TABLET ORAL
Status: DISCONTINUED | OUTPATIENT
Start: 2025-01-24 | End: 2025-01-24

## 2025-01-24 RX ORDER — LISINOPRIL 20 MG/1
20 TABLET ORAL 2 TIMES DAILY
Status: DISCONTINUED | OUTPATIENT
Start: 2025-01-24 | End: 2025-01-25 | Stop reason: HOSPADM

## 2025-01-24 RX ADMIN — Medication 15 G: at 12:43

## 2025-01-24 RX ADMIN — LISINOPRIL 20 MG: 20 TABLET ORAL at 20:29

## 2025-01-24 RX ADMIN — SODIUM CHLORIDE, PRESERVATIVE FREE 10 ML: 5 INJECTION INTRAVENOUS at 20:29

## 2025-01-24 RX ADMIN — Medication 15 G: at 20:29

## 2025-01-24 RX ADMIN — LEVOTHYROXINE SODIUM 88 MCG: 0.09 TABLET ORAL at 09:21

## 2025-01-24 RX ADMIN — ASPIRIN 81 MG: 81 TABLET, COATED ORAL at 09:21

## 2025-01-24 RX ADMIN — ATORVASTATIN CALCIUM 10 MG: 10 TABLET, FILM COATED ORAL at 17:37

## 2025-01-24 RX ADMIN — ASPIRIN 81 MG: 81 TABLET, COATED ORAL at 20:29

## 2025-01-24 RX ADMIN — LISINOPRIL 10 MG: 5 TABLET ORAL at 09:21

## 2025-01-24 RX ADMIN — ENOXAPARIN SODIUM 40 MG: 100 INJECTION SUBCUTANEOUS at 09:24

## 2025-01-24 RX ADMIN — SODIUM CHLORIDE, PRESERVATIVE FREE 10 ML: 5 INJECTION INTRAVENOUS at 09:23

## 2025-01-24 NOTE — ICUWATCH
RRT Clinical Rounding Nurse Progress Report      SUBJECTIVE: Patient assessed secondary to transfer from critical care.      Vitals:    01/23/25 1435 01/23/25 1901 01/23/25 1946 01/23/25 2000   BP:  (!) 176/86 (!) 176/86    Pulse:  (!) 102 100 98   Resp:  21 25 25   Temp: 97.2 °F (36.2 °C) 97.4 °F (36.3 °C)     TempSrc: Axillary Oral     SpO2:  97%     Weight:       Height:            DETERIORATION INDEX SCORE: 43    ASSESSMENT:  Patient resting in chair comfortably. Alert and oriented. VSS. Daughter previously at bedside.    PLAN:  Will follow per RRT Clinical Rounding Program protocol.    Alexus Acevedo RN  [facility]: [phone]  [facility]: [phone]

## 2025-01-24 NOTE — CONSULTS
[facility] Consult    Admission Date:  1/21/2025    Admission Diagnosis  Syncope and collapse [R55]  Hyponatremia [E87.1]    We are asked by Dr. Lepe    History of Present Illness:  Ms. Siddiqui is an 84 year old female with PMH of HLD, HTN, IBS, PVD, and carotid stenosis who presented to the hospital by EMS for a syncopal episode. We have been consulted for her hyponatremia. Initial serum sodium was 121. Urine sodium 111, urine osm 448. She received IVF and sodium minimally improved, then worsened. Today it is 125. Patient reports to me that she does have some chronic pain for which she takes tramadol. No NSAIDs. Denies N/V or changes in UOP. She reports her fluid intake for a typical day which includes 3-4 cups of coffee, a large Ice tea when out to eat which she drinks the rest of the day continuously adding ice and water to the cup, then more water at night. No ETOH use. She reports she feels like she isn't drinking enough. Solute intake sounds adequate as she is a \"meat lover.\" She is on SSRI at home. No other offending meds. She has not been following a fluid restriction here and has two large cups of water and ice tea at bedside.     Past Medical History:   Diagnosis Date    Carotid artery stenosis 10/1/2014    Depression 1/4/2016    Hearing loss Last 5 to 10 years    Mostly Left ear    Hyperlipidemia     Hypertension     Hypothyroidism Per Chart    Insomnia 1/4/2016    Irritable bowel syndrome 1/4/2016    Osteoarthritis 1/4/2016    Osteoporosis 1/4/2016    Peripheral artery insufficiency (HCC) 1/4/2016    Peripheral vascular disease (HCC) Mar 14, 2022    See Dr John Cuevas 1/4/2016    TIA (transient ischemic attack) 9/11/2016      Past Surgical History:   Procedure Laterality Date    APPENDECTOMY      BREAST SURGERY Left     cyst removed    CHOLECYSTECTOMY      COLONOSCOPY      CYST INCISION AND DRAINAGE Left 1980's    HIP FRACTURE SURGERY Right 3/2014    HYSTERECTOMY (CERVIX STATUS

## 2025-01-24 NOTE — PLAN OF CARE
Problem: Chronic Conditions and Co-morbidities  Goal: Patient's chronic conditions and co-morbidity symptoms are monitored and maintained or improved  Outcome: Progressing    Problem: Chronic Conditions and Co-morbidities  Goal: Patient's chronic conditions and co-morbidity symptoms are monitored and maintained or improved  Outcome: Progressing    Problem: Discharge Planning  Goal: Discharge to home or other facility with appropriate resources  Outcome: Progressing    Problem: Pain  Goal: Verbalizes/displays adequate comfort level or baseline comfort level  Outcome: Progressing  Verbalizes/displays adequate comfort level or baseline comfort level:   Encourage patient to monitor pain and request assistance   Assess pain using appropriate pain scale   Administer analgesics based on type and severity of pain and evaluate response     Problem: Safety - Adult  Goal: Free from fall injury  Outcome: Progressing     Problem: ABCDS Injury Assessment  Goal: Absence of physical injury  Outcome: Progressing

## 2025-01-24 NOTE — PROGRESS NOTES
Hospitalist Progress Note   Admit Date:  2025 10:57 AM   Name:  Violet Siddiqui   Age:  84 y.o.  Sex:  female  :  1940   MRN:  [id number]   Room:  [facility]/    Presenting/Chief Complaint: Loss of Consciousness     Reason(s) for Admission: Syncope and collapse [R55]  Hyponatremia [E87.1]     Hospital Course:   Violet Siddiqui is a 84 y.o. female with medical history of hypertension, hypothyroidism, hyperlipidemia, and type 2 diabetes mellitus.  Patient came to emergency department secondary to loss of consciousness.  Patient has had duplex ultrasound showing greater than 70% stenosis but symptoms likely related to electrolyte abnormalities versus vasovagal.  Vascular surgery without any acute interventions at this time and recommend outpatient follow-up.  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      Subjective & 24hr Events:   Patient resting comfortably on examination.  Patient was up walking the room upon my exam and denied any current complaints.      Assessment & Plan:   Syncope  Likely related to electrolyte abnormalities versus vasovagal  CT head negative for any acute intracranial findings  Chest x-ray no acute cardiopulmonary disease noted  Carotid ultrasound again noting greater than 70% stenosis  Vascular surgery did have discussion with family and no plans for any interventions at this time and recommend outpatient follow-up  PT and OT who do not have any further recommendations at this time  Continue to monitor     Hyponatremia  Sodium still staying around 125  Goal sodium for the next 24 hours would be 131-133  Will place on fluid restriction given urine studies suggestive of SIADH  Discontinue sertraline as this may be triggering agent  Fluid restrict 1500 mL  Increase sodium tablets to 2 g 3 times daily  Nephrology consulted given no improvement in sodium at this time  Continue to monitor     Type 2 diabetes

## 2025-01-24 NOTE — PROGRESS NOTES
TRANSFER - OUT REPORT:    Verbal report given to Alivia GARZA on Violet Siddiqui  being [place] [facility] [place] routine progression of patient care       Report consisted of patient's Situation, Background, Assessment and   Recommendations(SBAR).     Information from the following report(s) Adult Overview, Surgery Report, Intake/Output, MAR, and Recent Results was reviewed with the receiving nurse.           Lines:   Peripheral IV 01/22/25 Left Hand (Active)   Site Assessment Clean, dry & intact 01/23/25 1900   Line Status Normal saline locked;Infusing 01/23/25 1900   Line Care Connections checked and tightened 01/23/25 0300   Phlebitis Assessment No symptoms 01/23/25 1900   Infiltration Assessment 0 01/23/25 1900   Alcohol Cap Used No 01/23/25 1900   Dressing Status Clean, dry & intact 01/23/25 1900   Dressing Type Transparent 01/23/25 1900        Opportunity for questions and clarification was provided.      Patient transported with:  [facility].

## 2025-01-24 NOTE — PLAN OF CARE
Problem: Chronic Conditions and Co-morbidities  Goal: Patient's chronic conditions and co-morbidity symptoms are monitored and maintained or improved  1/24/2025 1001 by Monika Trinh RN  Outcome: Progressing  1/24/2025 0234 by Alivia Dewitt RN  Outcome: Progressing  Flowsheets (Taken 1/23/2025 1900 by Paul Jeffery RN)  Care Plan - Patient's Chronic Conditions and Co-Morbidity Symptoms are Monitored and Maintained or Improved:   Monitor and assess patient's chronic conditions and comorbid symptoms for stability, deterioration, or improvement   Collaborate with multidisciplinary team to address chronic and comorbid conditions and prevent exacerbation or deterioration   Update acute care plan with appropriate goals if chronic or comorbid symptoms are exacerbated and prevent overall improvement and discharge     Problem: Discharge Planning  Goal: Discharge to home or other facility with appropriate resources  1/24/2025 1001 by Monika Trinh RN  Outcome: Progressing  1/24/2025 0234 by Alivia Dewitt RN  Outcome: Progressing  Flowsheets (Taken 1/23/2025 1900 by Paul Jeffery RN)  Discharge to home or other facility with appropriate resources:   Identify barriers to discharge with patient and caregiver   Arrange for needed discharge resources and transportation as appropriate   Identify discharge learning needs (meds, wound care, etc)     Problem: Pain  Goal: Verbalizes/displays adequate comfort level or baseline comfort level  1/24/2025 1001 by Monika Trinh RN  Outcome: Progressing  1/24/2025 0234 by Alivia Dewitt RN  Outcome: Progressing  Flowsheets (Taken 1/23/2025 1901 by Paul Jeffery, RN)  Verbalizes/displays adequate comfort level or baseline comfort level:   Encourage patient to monitor pain and request assistance   Assess pain using appropriate pain scale   Administer analgesics based on type and severity of pain and evaluate response     Problem: Safety - Adult  Goal: Free from fall

## 2025-01-24 NOTE — PROGRESS NOTES
ACUTE OCCUPATIONAL THERAPY GOALS:   (Developed with and agreed upon by patient and/or caregiver.)  1. Patient will perform grooming with SPV and adaptive equipment as needed.  2. Patient will perform upper body dressing with SPV and adaptive equipment as needed.  3. Patient will perform lower body dressing with SPV and adaptive equipment as needed.  4. Patient will perform toileting and toilet transfer with SPV and adaptive equipment as needed.  5. Patient will perform ADL functional mobility and tranfers in room with SPV and adaptive equipment as needed.  6. Patient/family to demonstrate knowledge of home safety and DME recommendations.    Timeframe: 7 visits     GOALS MET 1/24/25     OCCUPATIONAL THERAPY Initial Assessment, Daily Note, and AM       OT Visit Days: 1  Acknowledge Orders  Time  OT Charge Capture  Rehab Caseload Tracker      Violet Siddiqui is a 84 y.o. female   PRIMARY DIAGNOSIS: Hyponatremia  Syncope and collapse [R55]  Hyponatremia [E87.1]       Reason for Referral: Generalized Muscle Weakness (M62.81)  Other lack of cordination (R27.8)  Difficulty in walking, Not elsewhere classified (R26.2)  Inpatient: Payor: MEDICARE / Plan: MEDICARE PART A AND B / Product Type: *No Product type* /     ASSESSMENT:     REHAB RECOMMENDATIONS:   Recommendation to date pending progress:  Setting:  No further skilled occupational therapy after discharge from hospital    Equipment:    None     ASSESSMENT:  Ms. Siddiqui is admitted for the above diagnoses and presents with overall deficits in strength, functional mobility and ADL performance.  She lives with spouse in a 1 level home with family right around the corner. At baseline, she reports independence with ADLs and with mobility. Today, she was able to perform bed mobility, toileting, grooming, functional household mobility and full lap around 4th floor using RW and taking 1 seated rest break. She was able to carry on full conversation throughout session

## 2025-01-24 NOTE — PROGRESS NOTES
ACUTE PHYSICAL THERAPY GOALS:   (Developed with and agreed upon by patient and/or caregiver.)     LTG:  (1.)Ms. Siddiqui will move from supine to sit and sit to supine  in bed with INDEPENDENT within 5 treatment day(s).    (2.)Ms. Siddiqui will transfer from bed to chair and chair to bed with SUPERVISION using the least restrictive device within 5 treatment day(s).    (3.)Ms. Siddiqui will ambulate with SUPERVISION for 200 feet with the least restrictive device within 5 treatment day(s).  4.  Ms. Siddiqui will ambulate up/down 1 step with the least restrictive device and supervision within 5 treatment days.    ________________________________________________________________________________________________      PHYSICAL THERAPY Daily Note and PM  (Link to Caseload Tracking: PT Visit Days : 2  Acknowledge Orders  Time In/Out  PT Charge Capture  Rehab Caseload Tracker    Violet Siddiqui is a 84 y.o. female   PRIMARY DIAGNOSIS: Hyponatremia  Syncope and collapse [R55]  Hyponatremia [E87.1]       Reason for Referral: Generalized Muscle Weakness (M62.81)  Inpatient: Payor: MEDICARE / Plan: MEDICARE PART A AND B / Product Type: *No Product type* /     ASSESSMENT:     REHAB RECOMMENDATIONS:   Recommendation to date pending progress:  Setting:  No further skilled physical therapy after discharge from hospital    Equipment:    None     ASSESSMENT:  Ms. Siddiqui admitted with above diagnoses.  Patient was brought to the ER after a syncopal episode at home while eating breakfast.  Patient is independent/modified independent at base.  She occasionally uses a cane but has a ROLLING WALKER as well.  She has a history of R hip fracture (?hemiarthroplasty) that continues to give her some trouble.  Patient will be followed by therapy during admission to ensure continued mobility and ability to return home at d/c.  No needs are anticipated at d/c.  Patient did well with today's session.  She moved well with the ROLLING WALKER with

## 2025-01-25 VITALS
BODY MASS INDEX: 28.7 KG/M2 | WEIGHT: 152 LBS | SYSTOLIC BLOOD PRESSURE: 143 MMHG | RESPIRATION RATE: 16 BRPM | HEIGHT: 61 IN | TEMPERATURE: 97.9 F | OXYGEN SATURATION: 98 % | HEART RATE: 96 BPM | DIASTOLIC BLOOD PRESSURE: 81 MMHG

## 2025-01-25 LAB
ANION GAP SERPL CALC-SCNC: 7 MMOL/L (ref 7–16)
BUN SERPL-MCNC: 33 MG/DL (ref 8–23)
CALCIUM SERPL-MCNC: 9 MG/DL (ref 8.8–10.2)
CHLORIDE SERPL-SCNC: 91 MMOL/L (ref 98–107)
CO2 SERPL-SCNC: 28 MMOL/L (ref 20–29)
CORTIS BS SERPL-MCNC: 18.1 UG/DL
CREAT SERPL-MCNC: 0.66 MG/DL (ref 0.6–1.1)
GLUCOSE BLD STRIP.AUTO-MCNC: 97 MG/DL (ref 65–100)
GLUCOSE SERPL-MCNC: 109 MG/DL (ref 70–99)
POTASSIUM SERPL-SCNC: 4 MMOL/L (ref 3.5–5.1)
SERVICE CMNT-IMP: NORMAL
SODIUM SERPL-SCNC: 126 MMOL/L (ref 136–145)

## 2025-01-25 PROCEDURE — 80048 BASIC METABOLIC PNL TOTAL CA: CPT

## 2025-01-25 PROCEDURE — 36415 COLL VENOUS BLD VENIPUNCTURE: CPT

## 2025-01-25 PROCEDURE — 6370000000 HC RX 637 (ALT 250 FOR IP): Performed by: INTERNAL MEDICINE

## 2025-01-25 PROCEDURE — 82962 GLUCOSE BLOOD TEST: CPT

## 2025-01-25 PROCEDURE — 2500000003 HC RX 250 WO HCPCS: Performed by: INTERNAL MEDICINE

## 2025-01-25 RX ORDER — AMLODIPINE BESYLATE 5 MG/1
5 TABLET ORAL DAILY
Status: DISCONTINUED | OUTPATIENT
Start: 2025-01-25 | End: 2025-01-25 | Stop reason: HOSPADM

## 2025-01-25 RX ADMIN — SODIUM CHLORIDE, PRESERVATIVE FREE 10 ML: 5 INJECTION INTRAVENOUS at 09:33

## 2025-01-25 RX ADMIN — Medication 15 G: at 09:29

## 2025-01-25 RX ADMIN — AMLODIPINE BESYLATE 5 MG: 5 TABLET ORAL at 09:29

## 2025-01-25 RX ADMIN — ASPIRIN 81 MG: 81 TABLET, COATED ORAL at 09:29

## 2025-01-25 RX ADMIN — LISINOPRIL 20 MG: 20 TABLET ORAL at 09:29

## 2025-01-25 RX ADMIN — LEVOTHYROXINE SODIUM 88 MCG: 0.09 TABLET ORAL at 06:13

## 2025-01-25 NOTE — DISCHARGE SUMMARY
Hospitalist Discharge Summary   Admit Date:  2025 10:57 AM   DC Note date: 2025  Name:  Violet Siddiqui   Age:  84 y.o.  Sex:  female  :  1940   MRN:  [id number]   Room:  [facility]  PCP:  Zuleika Medrano MD    Presenting Complaint: Loss of Consciousness     Initial Admission Diagnosis: Syncope and collapse [R55]  Hyponatremia [E87.1]     Problem List for this Hospitalization (present on admission):    Principal Problem:    Hyponatremia  Active Problems:    Primary hypertension    Type 2 diabetes mellitus with peripheral vascular disease (HCC)    Hypothyroidism    Hyperlipidemia  Resolved Problems:    * No resolved hospital problems. *      Hospital Course:  84-year-old female past medical history significant for hypertension, hypothyroidism, hyperlipidemia and type 2 diabetes mellitus.  Patient came in secondary to loss of consciousness likely related to volume depletion and electrolyte abnormalities.  Patient had repeat carotid duplex showing greater than 70% stenosis and vascular surgery recommending outpatient follow-up at this time.  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  Patient seen by nephrology team and sodium still staying around 126 but they are okay with patient being discharged and following up outpatient.  Patient had no further questions on examination and is stable for discharge at this time.    Disposition: Home  Diet: ADULT DIET; Regular; 1500 ml  Code Status: Full Code    Follow Ups:  Follow-up Information       Follow up With Specialties Details Why Contact Info    Zuleika Medrano MD Internal Medicine Follow up  [address] [place] [address]  [phone]      Zuleika Medrano MD Internal Medicine   [address]  [place] [address]  [phone]      Monika Whitfield MD Hospitalist Follow up  [address] [place] [address]  [phone]              Future Appointments         Provider Specialty Dept

## 2025-01-25 NOTE — DISCHARGE INSTRUCTIONS
Please stick to fluid restriction of 1.5 L/day  Follow-up with nephrology as outpatient  Discontinue sertraline

## 2025-01-27 ENCOUNTER — CARE COORDINATION (OUTPATIENT)
Dept: CARE COORDINATION | Facility: CLINIC | Age: 85
End: 2025-01-27

## 2025-01-27 DIAGNOSIS — R55 SYNCOPE AND COLLAPSE: Primary | ICD-10-CM

## 2025-01-27 DIAGNOSIS — E87.1 HYPONATREMIA: ICD-10-CM

## 2025-01-27 PROCEDURE — 1111F DSCHRG MED/CURRENT MED MERGE: CPT | Performed by: STUDENT IN AN ORGANIZED HEALTH CARE EDUCATION/TRAINING PROGRAM

## 2025-01-27 NOTE — CARE COORDINATION
Internal Medicine [phone]          Care Transition Nurse provided contact information.   Care Coordinator to follow up next week  based on severity of symptoms and risk factors.  Plan for next call: Symptom management - assess for continued improvements and/or any new or worsening signs and symptoms to report and follow up.     Ching Hutchison RN

## 2025-01-30 ENCOUNTER — OFFICE VISIT (OUTPATIENT)
Dept: INTERNAL MEDICINE CLINIC | Facility: CLINIC | Age: 85
End: 2025-01-30

## 2025-01-30 VITALS
DIASTOLIC BLOOD PRESSURE: 60 MMHG | HEART RATE: 84 BPM | HEIGHT: 61 IN | SYSTOLIC BLOOD PRESSURE: 124 MMHG | OXYGEN SATURATION: 99 % | BODY MASS INDEX: 28.51 KG/M2 | WEIGHT: 151 LBS

## 2025-01-30 DIAGNOSIS — R39.11 URINARY HESITANCY: ICD-10-CM

## 2025-01-30 DIAGNOSIS — Z09 HOSPITAL DISCHARGE FOLLOW-UP: Primary | ICD-10-CM

## 2025-01-30 DIAGNOSIS — E22.2 SIADH (SYNDROME OF INAPPROPRIATE ADH PRODUCTION) (HCC): ICD-10-CM

## 2025-01-30 DIAGNOSIS — R25.1 TREMOR: ICD-10-CM

## 2025-01-30 DIAGNOSIS — E03.9 HYPOTHYROIDISM, UNSPECIFIED TYPE: ICD-10-CM

## 2025-01-30 DIAGNOSIS — F11.99 OPIOID USE, UNSPECIFIED WITH UNSPECIFIED OPIOID-INDUCED DISORDER (HCC): ICD-10-CM

## 2025-01-30 DIAGNOSIS — N18.30 STAGE 3 CHRONIC KIDNEY DISEASE, UNSPECIFIED WHETHER STAGE 3A OR 3B CKD (HCC): ICD-10-CM

## 2025-01-30 LAB
ALBUMIN SERPL-MCNC: 3.8 G/DL (ref 3.2–4.6)
ALBUMIN/GLOB SERPL: 1 (ref 1–1.9)
ALP SERPL-CCNC: 131 U/L (ref 35–104)
ALT SERPL-CCNC: 26 U/L (ref 8–45)
ANION GAP SERPL CALC-SCNC: 14 MMOL/L (ref 7–16)
AST SERPL-CCNC: 25 U/L (ref 15–37)
BASOPHILS # BLD: 0.08 K/UL (ref 0–0.2)
BASOPHILS NFR BLD: 0.8 % (ref 0–2)
BILIRUB SERPL-MCNC: 0.5 MG/DL (ref 0–1.2)
BILIRUBIN, URINE, POC: NEGATIVE
BLOOD URINE, POC: NORMAL
BUN SERPL-MCNC: 42 MG/DL (ref 8–23)
CALCIUM SERPL-MCNC: 10.2 MG/DL (ref 8.8–10.2)
CHLORIDE SERPL-SCNC: 95 MMOL/L (ref 98–107)
CO2 SERPL-SCNC: 28 MMOL/L (ref 20–29)
CREAT SERPL-MCNC: 0.97 MG/DL (ref 0.6–1.1)
DIFFERENTIAL METHOD BLD: ABNORMAL
EOSINOPHIL # BLD: 0.16 K/UL (ref 0–0.8)
EOSINOPHIL NFR BLD: 1.6 % (ref 0.5–7.8)
ERYTHROCYTE [DISTWIDTH] IN BLOOD BY AUTOMATED COUNT: 12.9 % (ref 11.9–14.6)
GLOBULIN SER CALC-MCNC: 3.7 G/DL (ref 2.3–3.5)
GLUCOSE SERPL-MCNC: 128 MG/DL (ref 70–99)
GLUCOSE URINE, POC: NEGATIVE
HCT VFR BLD AUTO: 44.3 % (ref 35.8–46.3)
HGB BLD-MCNC: 14.5 G/DL (ref 11.7–15.4)
IMM GRANULOCYTES # BLD AUTO: 0.04 K/UL (ref 0–0.5)
IMM GRANULOCYTES NFR BLD AUTO: 0.4 % (ref 0–5)
KETONES, URINE, POC: NEGATIVE
LEUKOCYTE ESTERASE, URINE, POC: NORMAL
LYMPHOCYTES # BLD: 2.38 K/UL (ref 0.5–4.6)
LYMPHOCYTES NFR BLD: 23.5 % (ref 13–44)
MCH RBC QN AUTO: 29.7 PG (ref 26.1–32.9)
MCHC RBC AUTO-ENTMCNC: 32.7 G/DL (ref 31.4–35)
MCV RBC AUTO: 90.6 FL (ref 82–102)
MONOCYTES # BLD: 1.12 K/UL (ref 0.1–1.3)
MONOCYTES NFR BLD: 11.1 % (ref 4–12)
NEUTS SEG # BLD: 6.33 K/UL (ref 1.7–8.2)
NEUTS SEG NFR BLD: 62.6 % (ref 43–78)
NITRITE, URINE, POC: NEGATIVE
NRBC # BLD: 0 K/UL (ref 0–0.2)
PH, URINE, POC: 6.5 (ref 4.6–8)
PLATELET # BLD AUTO: 480 K/UL (ref 150–450)
PMV BLD AUTO: 9.4 FL (ref 9.4–12.3)
POTASSIUM SERPL-SCNC: 4.4 MMOL/L (ref 3.5–5.1)
PROT SERPL-MCNC: 7.5 G/DL (ref 6.3–8.2)
PROTEIN,URINE, POC: 100
RBC # BLD AUTO: 4.89 M/UL (ref 4.05–5.2)
SODIUM SERPL-SCNC: 137 MMOL/L (ref 136–145)
SPECIFIC GRAVITY, URINE, POC: 1.01 (ref 1–1.03)
URINALYSIS CLARITY, POC: CLEAR
URINALYSIS COLOR, POC: YELLOW
UROBILINOGEN, POC: NORMAL
WBC # BLD AUTO: 10.1 K/UL (ref 4.3–11.1)

## 2025-01-30 RX ORDER — CEPHALEXIN 500 MG/1
500 CAPSULE ORAL 2 TIMES DAILY
Qty: 14 CAPSULE | Refills: 0 | Status: SHIPPED | OUTPATIENT
Start: 2025-01-30 | End: 2025-02-06

## 2025-01-30 ASSESSMENT — PATIENT HEALTH QUESTIONNAIRE - PHQ9
SUM OF ALL RESPONSES TO PHQ QUESTIONS 1-9: 8
9. THOUGHTS THAT YOU WOULD BE BETTER OFF DEAD, OR OF HURTING YOURSELF: NOT AT ALL
SUM OF ALL RESPONSES TO PHQ QUESTIONS 1-9: 8
6. FEELING BAD ABOUT YOURSELF - OR THAT YOU ARE A FAILURE OR HAVE LET YOURSELF OR YOUR FAMILY DOWN: NOT AT ALL
SUM OF ALL RESPONSES TO PHQ QUESTIONS 1-9: 8
1. LITTLE INTEREST OR PLEASURE IN DOING THINGS: SEVERAL DAYS
7. TROUBLE CONCENTRATING ON THINGS, SUCH AS READING THE NEWSPAPER OR WATCHING TELEVISION: NOT AT ALL
8. MOVING OR SPEAKING SO SLOWLY THAT OTHER PEOPLE COULD HAVE NOTICED. OR THE OPPOSITE, BEING SO FIGETY OR RESTLESS THAT YOU HAVE BEEN MOVING AROUND A LOT MORE THAN USUAL: NEARLY EVERY DAY
SUM OF ALL RESPONSES TO PHQ QUESTIONS 1-9: 8
3. TROUBLE FALLING OR STAYING ASLEEP: NEARLY EVERY DAY
5. POOR APPETITE OR OVEREATING: NOT AT ALL
10. IF YOU CHECKED OFF ANY PROBLEMS, HOW DIFFICULT HAVE THESE PROBLEMS MADE IT FOR YOU TO DO YOUR WORK, TAKE CARE OF THINGS AT HOME, OR GET ALONG WITH OTHER PEOPLE: NOT DIFFICULT AT ALL
2. FEELING DOWN, DEPRESSED OR HOPELESS: SEVERAL DAYS
4. FEELING TIRED OR HAVING LITTLE ENERGY: NOT AT ALL
SUM OF ALL RESPONSES TO PHQ9 QUESTIONS 1 & 2: 2

## 2025-01-30 NOTE — PROGRESS NOTES
FOLLOW UP VISIT    Subjective:    Violet Siddiqui (: 1940) is a 84 y.o., female,   Chief Complaint   Patient presents with    Follow-Up from Hospital     Low sodium    Discuss Medications    discuss pain in right hip    Memory Loss     Since coming home from the hospital       HPI:    Hospitalized 2025 - 2025 and was contacted 2025 for JILL.  Admitted for episode of syncope (occurred after chopping onions and she then sat in chair and passed out, EMS called she came to but passed out again, patient doesn't remember episode) felt to be related to hypovolemia and hyponatremia diagnosed with SIADH, Zoloft was stopped, was seen by nephrology, she was discharged on urea.  She has been restricting her free water intake. No unexplained wt loss, night sweats, hemoptysis.    Vascular saw her for known carotid stenosis and was provided with reassurance.    She has f/u with nephrology in a few weeks    Overall feeling better    Having subjective memory problems since hospitalization. Couldn't remember password for [employer]. Scored 28/30 on MMSE today.       Urinary urgency in the hospital but UA negative for UTI and she has noticed more urinary frequency. No burning urination.    The following portions of the patient's history were reviewed and updated as appropriate:      Current Outpatient Medications   Medication Sig Dispense Refill    cephALEXin (KEFLEX) 500 MG capsule Take 1 capsule by mouth 2 times daily for 7 days 14 capsule 0    urea (URE-NA) 15 g PACK packet Take 15 g by mouth in the morning and at bedtime 60 each 0    atorvastatin (LIPITOR) 10 MG tablet Take 1 tablet by mouth every evening 90 tablet 3    levothyroxine (SYNTHROID) 88 MCG tablet Take 1/2 tab on  and one all other days Indications: a condition with low thyroid hormone levels 90 tablet 3    lisinopril (PRINIVIL;ZESTRIL) 20 MG tablet Take 1 tablet by mouth 2 times daily 180 tablet 3    denosumab (PROLIA) 60 MG/ML SOSY SC

## 2025-02-01 LAB
BACTERIA SPEC CULT: NORMAL
SERVICE CMNT-IMP: NORMAL

## 2025-02-03 ENCOUNTER — CARE COORDINATION (OUTPATIENT)
Dept: CARE COORDINATION | Facility: CLINIC | Age: 85
End: 2025-02-03

## 2025-02-03 NOTE — CARE COORDINATION
Care Transitions Note    Follow Up Call     Attempted to reach patient for transitions of care follow up.  Unable to reach patient.      Outreach Attempts:   Unable to leave message.     Care Summary Note: per chart review patient is following plan of care and attended pcp follow up as scheduled; patient was started on cephALEXin (KEFLEX) 500 MG capsule; Take 1 capsule by mouth 2 times daily for 7 days at that visit.  No new needs are noted.    Follow Up Appointment:   Future Appointments         Provider Specialty Dept Phone    2/20/2025 10:00 AM Kym Brantley APRN - CNP Internal Medicine [phone]    3/12/2025 9:30 AM CHAIR 1 Neurology [phone]    6/4/2025 10:00 AM Zuleika Medrano MD Internal Medicine [phone]           FEB 25 2025 01:30 PM - Office Visit  [facility]-Dena Mitchell NP       Plan for follow-up call in 6-10 days based on severity of symptoms and risk factors. Plan for next call: medication management-assess completion of antibiotic  Assess continued compliance with plan of care.    Nani Sanches LPN

## 2025-02-10 ENCOUNTER — CARE COORDINATION (OUTPATIENT)
Dept: CARE COORDINATION | Facility: CLINIC | Age: 85
End: 2025-02-10

## 2025-02-10 NOTE — CARE COORDINATION
Care Transitions Note    Follow Up Call     Patient Current Location:  Home: [facility] [address] [place] [address]    [facility] Care Coordinator contacted the patient by telephone. Verified name and  as identifiers.    Additional needs identified to be addressed with provider   No needs identified                 Method of communication with provider: none.    Care Summary Note: Patient reports she is feeling well and has completed antibiotic initiated by PCP.  Patient reports concerns regarding diet and memory loss since admission.  Offered referral to dietician, patient would like to follow up with PCP first and ask questions there. Denies new needs or concerns.    Plan of care updates since last contact:  No changes since last call       Advance Care Planning:   Does patient have an Advance Directive: health care decision maker confirmed and   Primary Decision Maker: Kamaljit Siddiqui III - Spouse - [phone] .    Medication Review:  No changes since last call.     Remote Patient Monitoring:  Offered patient enrollment in the Remote Patient Monitoring (RPM) program for in-home monitoring: Patient is not eligible for RPM program because: na .    Assessments:  Care Transitions Subsequent and Final Call    Schedule Follow Up Appointment with PCP: Completed  Subsequent and Final Calls  Do you have any ongoing symptoms?: No  Have your medications changed?: No  Do you have any questions related to your medications?: No  Do you currently have any active services?: No  Do you have any needs or concerns that I can assist you with?: No  Identified Barriers: None  Care Transitions Interventions  No Identified Needs  Other Interventions:              Follow Up Appointment:   Reviewed upcoming appointment(s). and JILL appointment attended as scheduled   Future Appointments         Provider Specialty Dept Phone    2025 10:00 AM Kym Brantley, OTTO - CNP Internal Medicine [phone]    3/12/2025 9:30 AM CHAIR 1

## 2025-02-11 DIAGNOSIS — E11.51 TYPE 2 DIABETES MELLITUS WITH PERIPHERAL VASCULAR DISEASE (HCC): ICD-10-CM

## 2025-02-11 DIAGNOSIS — N18.30 STAGE 3 CHRONIC KIDNEY DISEASE, UNSPECIFIED WHETHER STAGE 3A OR 3B CKD (HCC): Primary | ICD-10-CM

## 2025-02-19 ENCOUNTER — HOSPITAL ENCOUNTER (INPATIENT)
Age: 85
LOS: 4 days | Discharge: HOME HEALTH | DRG: 645 | End: 2025-02-23
Attending: EMERGENCY MEDICINE | Admitting: FAMILY MEDICINE
Payer: MEDICARE

## 2025-02-19 DIAGNOSIS — E22.2 SIADH (SYNDROME OF INAPPROPRIATE ADH PRODUCTION): ICD-10-CM

## 2025-02-19 DIAGNOSIS — E11.51 TYPE 2 DIABETES MELLITUS WITH PERIPHERAL VASCULAR DISEASE (HCC): ICD-10-CM

## 2025-02-19 DIAGNOSIS — N18.30 STAGE 3 CHRONIC KIDNEY DISEASE, UNSPECIFIED WHETHER STAGE 3A OR 3B CKD (HCC): ICD-10-CM

## 2025-02-19 DIAGNOSIS — E87.1 HYPONATREMIA: Primary | ICD-10-CM

## 2025-02-19 LAB
ALBUMIN SERPL-MCNC: 3.5 G/DL (ref 3.2–4.6)
ALBUMIN SERPL-MCNC: 3.9 G/DL (ref 3.2–4.6)
ALBUMIN/GLOB SERPL: 1 (ref 1–1.9)
ALBUMIN/GLOB SERPL: 1 (ref 1–1.9)
ALP SERPL-CCNC: 106 U/L (ref 35–104)
ALP SERPL-CCNC: 116 U/L (ref 35–104)
ALT SERPL-CCNC: 27 U/L (ref 8–45)
ALT SERPL-CCNC: 32 U/L (ref 8–45)
ANION GAP SERPL CALC-SCNC: 10 MMOL/L (ref 7–16)
ANION GAP SERPL CALC-SCNC: 9 MMOL/L (ref 7–16)
AST SERPL-CCNC: 29 U/L (ref 15–37)
AST SERPL-CCNC: 37 U/L (ref 15–37)
BASOPHILS # BLD: 0.02 K/UL (ref 0–0.2)
BASOPHILS NFR BLD: 0.2 % (ref 0–2)
BILIRUB SERPL-MCNC: 0.5 MG/DL (ref 0–1.2)
BILIRUB SERPL-MCNC: 0.5 MG/DL (ref 0–1.2)
BUN SERPL-MCNC: 26 MG/DL (ref 8–23)
BUN SERPL-MCNC: 27 MG/DL (ref 8–23)
CALCIUM SERPL-MCNC: 9.7 MG/DL (ref 8.8–10.2)
CALCIUM SERPL-MCNC: 9.7 MG/DL (ref 8.8–10.2)
CHLORIDE SERPL-SCNC: 84 MMOL/L (ref 98–107)
CHLORIDE SERPL-SCNC: 89 MMOL/L (ref 98–107)
CO2 SERPL-SCNC: 28 MMOL/L (ref 20–29)
CO2 SERPL-SCNC: 29 MMOL/L (ref 20–29)
CREAT SERPL-MCNC: 0.8 MG/DL (ref 0.6–1.1)
CREAT SERPL-MCNC: 0.84 MG/DL (ref 0.6–1.1)
DIFFERENTIAL METHOD BLD: ABNORMAL
EOSINOPHIL # BLD: 0.03 K/UL (ref 0–0.8)
EOSINOPHIL NFR BLD: 0.3 % (ref 0.5–7.8)
ERYTHROCYTE [DISTWIDTH] IN BLOOD BY AUTOMATED COUNT: 12 % (ref 11.9–14.6)
EST. AVERAGE GLUCOSE BLD GHB EST-MCNC: 134 MG/DL
GLOBULIN SER CALC-MCNC: 3.5 G/DL (ref 2.3–3.5)
GLOBULIN SER CALC-MCNC: 3.8 G/DL (ref 2.3–3.5)
GLUCOSE SERPL-MCNC: 107 MG/DL (ref 70–99)
GLUCOSE SERPL-MCNC: 123 MG/DL (ref 70–99)
HBA1C MFR BLD: 6.3 % (ref 0–5.6)
HCT VFR BLD AUTO: 40.7 % (ref 35.8–46.3)
HGB BLD-MCNC: 14.1 G/DL (ref 11.7–15.4)
IMM GRANULOCYTES # BLD AUTO: 0.06 K/UL (ref 0–0.5)
IMM GRANULOCYTES NFR BLD AUTO: 0.5 % (ref 0–5)
LYMPHOCYTES # BLD: 2.18 K/UL (ref 0.5–4.6)
LYMPHOCYTES NFR BLD: 19.6 % (ref 13–44)
MAGNESIUM SERPL-MCNC: 2 MG/DL (ref 1.8–2.4)
MCH RBC QN AUTO: 30.2 PG (ref 26.1–32.9)
MCHC RBC AUTO-ENTMCNC: 34.6 G/DL (ref 31.4–35)
MCV RBC AUTO: 87.2 FL (ref 82–102)
MONOCYTES # BLD: 1.38 K/UL (ref 0.1–1.3)
MONOCYTES NFR BLD: 12.4 % (ref 4–12)
NEUTS SEG # BLD: 7.47 K/UL (ref 1.7–8.2)
NEUTS SEG NFR BLD: 67 % (ref 43–78)
NRBC # BLD: 0 K/UL (ref 0–0.2)
PLATELET # BLD AUTO: 347 K/UL (ref 150–450)
PMV BLD AUTO: 9.4 FL (ref 9.4–12.3)
POTASSIUM SERPL-SCNC: 4.1 MMOL/L (ref 3.5–5.1)
POTASSIUM SERPL-SCNC: 4.1 MMOL/L (ref 3.5–5.1)
PROT SERPL-MCNC: 7 G/DL (ref 6.3–8.2)
PROT SERPL-MCNC: 7.7 G/DL (ref 6.3–8.2)
RBC # BLD AUTO: 4.67 M/UL (ref 4.05–5.2)
SODIUM SERPL-SCNC: 123 MMOL/L (ref 136–145)
SODIUM SERPL-SCNC: 125 MMOL/L (ref 136–145)
WBC # BLD AUTO: 11.1 K/UL (ref 4.3–11.1)

## 2025-02-19 PROCEDURE — 2580000003 HC RX 258: Performed by: EMERGENCY MEDICINE

## 2025-02-19 PROCEDURE — 93005 ELECTROCARDIOGRAM TRACING: CPT | Performed by: FAMILY MEDICINE

## 2025-02-19 PROCEDURE — 80053 COMPREHEN METABOLIC PANEL: CPT

## 2025-02-19 PROCEDURE — 83735 ASSAY OF MAGNESIUM: CPT

## 2025-02-19 PROCEDURE — 1100000003 HC PRIVATE W/ TELEMETRY

## 2025-02-19 PROCEDURE — 85025 COMPLETE CBC W/AUTO DIFF WBC: CPT

## 2025-02-19 PROCEDURE — 36415 COLL VENOUS BLD VENIPUNCTURE: CPT

## 2025-02-19 PROCEDURE — 99285 EMERGENCY DEPT VISIT HI MDM: CPT

## 2025-02-19 PROCEDURE — 84300 ASSAY OF URINE SODIUM: CPT

## 2025-02-19 PROCEDURE — 83935 ASSAY OF URINE OSMOLALITY: CPT

## 2025-02-19 RX ORDER — SODIUM CHLORIDE 0.9 % (FLUSH) 0.9 %
5-40 SYRINGE (ML) INJECTION EVERY 12 HOURS SCHEDULED
Status: DISCONTINUED | OUTPATIENT
Start: 2025-02-19 | End: 2025-02-23 | Stop reason: HOSPADM

## 2025-02-19 RX ORDER — 0.9 % SODIUM CHLORIDE 0.9 %
500 INTRAVENOUS SOLUTION INTRAVENOUS ONCE
Status: COMPLETED | OUTPATIENT
Start: 2025-02-19 | End: 2025-02-19

## 2025-02-19 RX ORDER — POTASSIUM CHLORIDE 7.45 MG/ML
10 INJECTION INTRAVENOUS PRN
Status: DISCONTINUED | OUTPATIENT
Start: 2025-02-19 | End: 2025-02-23 | Stop reason: HOSPADM

## 2025-02-19 RX ORDER — LISINOPRIL 20 MG/1
20 TABLET ORAL 2 TIMES DAILY
Status: DISCONTINUED | OUTPATIENT
Start: 2025-02-19 | End: 2025-02-23 | Stop reason: HOSPADM

## 2025-02-19 RX ORDER — TRAMADOL HYDROCHLORIDE 50 MG/1
50 TABLET ORAL
COMMUNITY
Start: 2025-02-06

## 2025-02-19 RX ORDER — POTASSIUM CHLORIDE 1500 MG/1
40 TABLET, EXTENDED RELEASE ORAL PRN
Status: DISCONTINUED | OUTPATIENT
Start: 2025-02-19 | End: 2025-02-23 | Stop reason: HOSPADM

## 2025-02-19 RX ORDER — SODIUM CHLORIDE 0.9 % (FLUSH) 0.9 %
5-40 SYRINGE (ML) INJECTION PRN
Status: DISCONTINUED | OUTPATIENT
Start: 2025-02-19 | End: 2025-02-23 | Stop reason: HOSPADM

## 2025-02-19 RX ORDER — ASPIRIN 81 MG/1
81 TABLET ORAL 2 TIMES DAILY
Status: DISCONTINUED | OUTPATIENT
Start: 2025-02-19 | End: 2025-02-23 | Stop reason: HOSPADM

## 2025-02-19 RX ORDER — ENOXAPARIN SODIUM 100 MG/ML
40 INJECTION SUBCUTANEOUS DAILY
Status: DISCONTINUED | OUTPATIENT
Start: 2025-02-20 | End: 2025-02-23 | Stop reason: HOSPADM

## 2025-02-19 RX ORDER — ONDANSETRON 4 MG/1
4 TABLET, ORALLY DISINTEGRATING ORAL EVERY 8 HOURS PRN
Status: DISCONTINUED | OUTPATIENT
Start: 2025-02-19 | End: 2025-02-23 | Stop reason: HOSPADM

## 2025-02-19 RX ORDER — FUROSEMIDE 20 MG/1
20 TABLET ORAL ONCE
Status: COMPLETED | OUTPATIENT
Start: 2025-02-20 | End: 2025-02-20

## 2025-02-19 RX ORDER — LEVOTHYROXINE SODIUM 88 UG/1
88 TABLET ORAL DAILY
Status: DISCONTINUED | OUTPATIENT
Start: 2025-02-20 | End: 2025-02-23 | Stop reason: HOSPADM

## 2025-02-19 RX ORDER — SODIUM CHLORIDE 9 MG/ML
INJECTION, SOLUTION INTRAVENOUS PRN
Status: DISCONTINUED | OUTPATIENT
Start: 2025-02-19 | End: 2025-02-23 | Stop reason: HOSPADM

## 2025-02-19 RX ORDER — TRAMADOL HYDROCHLORIDE 50 MG/1
50 TABLET ORAL EVERY 4 HOURS PRN
Status: DISCONTINUED | OUTPATIENT
Start: 2025-02-19 | End: 2025-02-23 | Stop reason: HOSPADM

## 2025-02-19 RX ORDER — ATORVASTATIN CALCIUM 10 MG/1
10 TABLET, FILM COATED ORAL EVERY EVENING
Status: DISCONTINUED | OUTPATIENT
Start: 2025-02-19 | End: 2025-02-23 | Stop reason: HOSPADM

## 2025-02-19 RX ORDER — SODIUM CHLORIDE 1 G/1
1 TABLET ORAL ONCE
Status: COMPLETED | OUTPATIENT
Start: 2025-02-20 | End: 2025-02-20

## 2025-02-19 RX ORDER — MAGNESIUM SULFATE IN WATER 40 MG/ML
2000 INJECTION, SOLUTION INTRAVENOUS PRN
Status: DISCONTINUED | OUTPATIENT
Start: 2025-02-19 | End: 2025-02-23 | Stop reason: HOSPADM

## 2025-02-19 RX ORDER — POLYETHYLENE GLYCOL 3350 17 G/17G
17 POWDER, FOR SOLUTION ORAL DAILY PRN
Status: DISCONTINUED | OUTPATIENT
Start: 2025-02-19 | End: 2025-02-23 | Stop reason: HOSPADM

## 2025-02-19 RX ORDER — MECLIZINE HYDROCHLORIDE 25 MG/1
25 TABLET ORAL 3 TIMES DAILY PRN
Status: DISCONTINUED | OUTPATIENT
Start: 2025-02-19 | End: 2025-02-23 | Stop reason: HOSPADM

## 2025-02-19 RX ORDER — ONDANSETRON 2 MG/ML
4 INJECTION INTRAMUSCULAR; INTRAVENOUS EVERY 6 HOURS PRN
Status: DISCONTINUED | OUTPATIENT
Start: 2025-02-19 | End: 2025-02-23 | Stop reason: HOSPADM

## 2025-02-19 RX ORDER — ACETAMINOPHEN 325 MG/1
650 TABLET ORAL EVERY 6 HOURS PRN
Status: DISCONTINUED | OUTPATIENT
Start: 2025-02-19 | End: 2025-02-23 | Stop reason: HOSPADM

## 2025-02-19 RX ORDER — ACETAMINOPHEN 650 MG/1
650 SUPPOSITORY RECTAL EVERY 6 HOURS PRN
Status: DISCONTINUED | OUTPATIENT
Start: 2025-02-19 | End: 2025-02-23 | Stop reason: HOSPADM

## 2025-02-19 RX ADMIN — SODIUM CHLORIDE 500 ML: 9 INJECTION, SOLUTION INTRAVENOUS at 20:21

## 2025-02-19 ASSESSMENT — PAIN DESCRIPTION - LOCATION: LOCATION: OTHER (COMMENT)

## 2025-02-19 ASSESSMENT — PAIN SCALES - GENERAL: PAINLEVEL_OUTOF10: 8

## 2025-02-19 ASSESSMENT — PAIN - FUNCTIONAL ASSESSMENT: PAIN_FUNCTIONAL_ASSESSMENT: 0-10

## 2025-02-19 NOTE — ED PROVIDER NOTES
replacement    TONSILLECTOMY          Social History     Socioeconomic History    Marital status:    Tobacco Use    Smoking status: Former     Current packs/day: 0.00     Average packs/day: 1.5 packs/day for 40.0 years (60.0 ttl pk-yrs)     Types: Cigarettes     Quit date: 3/21/2014     Years since quitting: 10.9    Smokeless tobacco: Never   Vaping Use    Vaping status: Never Used   Substance and Sexual Activity    Alcohol use: No    Drug use: Never    Sexual activity: Not Currently     Partners: Male   Social History Narrative    , walks the dog, yard work     Social Determinants of Health     Financial Resource Strain: Low Risk  (2/22/2024)    Overall Financial Resource Strain (CARDIA)     Difficulty of Paying Living Expenses: Not hard at all   Food Insecurity: No Food Insecurity (2/19/2025)    Hunger Vital Sign     Worried About Running Out of Food in the Last Year: Never true     Ran Out of Food in the Last Year: Never true   Transportation Needs: No Transportation Needs (2/19/2025)    PRAPARE - Transportation     Lack of Transportation (Medical): No     Lack of Transportation (Non-Medical): No   Physical Activity: Insufficiently Active (8/26/2024)    Exercise Vital Sign     Days of Exercise per Week: 2 days     Minutes of Exercise per Session: 20 min   Social Connections: Unknown (3/20/2021)    Received from [facility]    Social Connections     Frequency of Communication with Friends and Family: Not asked     Frequency of Social Gatherings with Friends and Family: Not asked   Intimate Partner Violence: Unknown (3/20/2021)    Received from [facility]    Intimate Partner Violence     Fear of Current or Ex-Partner: Not asked     Emotionally Abused: Not asked     Physically Abused: Not asked     Sexually Abused: Not asked   Housing Stability: Low Risk  (2/19/2025)    Housing Stability Vital Sign     Unable to Pay for Housing in the Last Year: No     Number of Times

## 2025-02-19 NOTE — ED TRIAGE NOTES
Arrives via [facility] from home.   Routine lab work completed. Sodium level came back at 123. Pt has hx of hyponatremia with SIADH along with memory loss   (+) headache and nauseated   Pt is alert and oriented at this time.   
Out of season

## 2025-02-20 LAB
ANION GAP SERPL CALC-SCNC: 11 MMOL/L (ref 7–16)
BASOPHILS # BLD: 0.03 K/UL (ref 0–0.2)
BASOPHILS NFR BLD: 0.2 % (ref 0–2)
BUN SERPL-MCNC: 31 MG/DL (ref 8–23)
CALCIUM SERPL-MCNC: 9.1 MG/DL (ref 8.8–10.2)
CHLORIDE SERPL-SCNC: 88 MMOL/L (ref 98–107)
CO2 SERPL-SCNC: 25 MMOL/L (ref 20–29)
CREAT SERPL-MCNC: 0.72 MG/DL (ref 0.6–1.1)
DIFFERENTIAL METHOD BLD: ABNORMAL
EKG ATRIAL RATE: 105 BPM
EKG DIAGNOSIS: NORMAL
EKG P AXIS: 71 DEGREES
EKG P-R INTERVAL: 157 MS
EKG Q-T INTERVAL: 335 MS
EKG QRS DURATION: 90 MS
EKG QTC CALCULATION (BAZETT): 439 MS
EKG R AXIS: 66 DEGREES
EKG T AXIS: 67 DEGREES
EKG VENTRICULAR RATE: 103 BPM
EOSINOPHIL # BLD: 0.08 K/UL (ref 0–0.8)
EOSINOPHIL NFR BLD: 0.6 % (ref 0.5–7.8)
ERYTHROCYTE [DISTWIDTH] IN BLOOD BY AUTOMATED COUNT: 12.1 % (ref 11.9–14.6)
GLUCOSE SERPL-MCNC: 97 MG/DL (ref 70–99)
HCT VFR BLD AUTO: 40.5 % (ref 35.8–46.3)
HGB BLD-MCNC: 14.5 G/DL (ref 11.7–15.4)
IMM GRANULOCYTES # BLD AUTO: 0.1 K/UL (ref 0–0.5)
IMM GRANULOCYTES NFR BLD AUTO: 0.8 % (ref 0–5)
LYMPHOCYTES # BLD: 2.8 K/UL (ref 0.5–4.6)
LYMPHOCYTES NFR BLD: 22.7 % (ref 13–44)
MCH RBC QN AUTO: 30.4 PG (ref 26.1–32.9)
MCHC RBC AUTO-ENTMCNC: 35.8 G/DL (ref 31.4–35)
MCV RBC AUTO: 84.9 FL (ref 82–102)
MONOCYTES # BLD: 1.61 K/UL (ref 0.1–1.3)
MONOCYTES NFR BLD: 13.1 % (ref 4–12)
NEUTS SEG # BLD: 7.69 K/UL (ref 1.7–8.2)
NEUTS SEG NFR BLD: 62.6 % (ref 43–78)
NRBC # BLD: 0 K/UL (ref 0–0.2)
OSMOLALITY SERPL: 278 MOSM/KG H2O (ref 280–301)
OSMOLALITY UR: 640 MOSM/KG H2O (ref 50–1400)
PLATELET # BLD AUTO: 345 K/UL (ref 150–450)
PMV BLD AUTO: 10.1 FL (ref 9.4–12.3)
POTASSIUM SERPL-SCNC: 4 MMOL/L (ref 3.5–5.1)
RBC # BLD AUTO: 4.77 M/UL (ref 4.05–5.2)
SODIUM SERPL-SCNC: 122 MMOL/L (ref 136–145)
SODIUM SERPL-SCNC: 122 MMOL/L (ref 136–145)
SODIUM SERPL-SCNC: 123 MMOL/L (ref 136–145)
SODIUM UR-SCNC: 64 MMOL/L
WBC # BLD AUTO: 12.3 K/UL (ref 4.3–11.1)

## 2025-02-20 PROCEDURE — 93010 ELECTROCARDIOGRAM REPORT: CPT | Performed by: INTERNAL MEDICINE

## 2025-02-20 PROCEDURE — 97161 PT EVAL LOW COMPLEX 20 MIN: CPT

## 2025-02-20 PROCEDURE — 36410 VNPNXR 3YR/> PHY/QHP DX/THER: CPT

## 2025-02-20 PROCEDURE — 05HF33Z INSERTION OF INFUSION DEVICE INTO LEFT CEPHALIC VEIN, PERCUTANEOUS APPROACH: ICD-10-PCS

## 2025-02-20 PROCEDURE — 36415 COLL VENOUS BLD VENIPUNCTURE: CPT

## 2025-02-20 PROCEDURE — 80048 BASIC METABOLIC PNL TOTAL CA: CPT

## 2025-02-20 PROCEDURE — 84295 ASSAY OF SERUM SODIUM: CPT

## 2025-02-20 PROCEDURE — C1751 CATH, INF, PER/CENT/MIDLINE: HCPCS

## 2025-02-20 PROCEDURE — 2500000003 HC RX 250 WO HCPCS: Performed by: FAMILY MEDICINE

## 2025-02-20 PROCEDURE — 6360000002 HC RX W HCPCS: Performed by: FAMILY MEDICINE

## 2025-02-20 PROCEDURE — 97530 THERAPEUTIC ACTIVITIES: CPT

## 2025-02-20 PROCEDURE — 6370000000 HC RX 637 (ALT 250 FOR IP): Performed by: FAMILY MEDICINE

## 2025-02-20 PROCEDURE — 85025 COMPLETE CBC W/AUTO DIFF WBC: CPT

## 2025-02-20 PROCEDURE — 76937 US GUIDE VASCULAR ACCESS: CPT

## 2025-02-20 PROCEDURE — 1100000003 HC PRIVATE W/ TELEMETRY

## 2025-02-20 PROCEDURE — 83930 ASSAY OF BLOOD OSMOLALITY: CPT

## 2025-02-20 PROCEDURE — 6370000000 HC RX 637 (ALT 250 FOR IP): Performed by: PHYSICIAN ASSISTANT

## 2025-02-20 RX ORDER — SODIUM CHLORIDE 1 G/1
1 TABLET ORAL 2 TIMES DAILY WITH MEALS
Status: DISCONTINUED | OUTPATIENT
Start: 2025-02-20 | End: 2025-02-22

## 2025-02-20 RX ORDER — LABETALOL HYDROCHLORIDE 5 MG/ML
10 INJECTION, SOLUTION INTRAVENOUS EVERY 6 HOURS PRN
Status: DISCONTINUED | OUTPATIENT
Start: 2025-02-20 | End: 2025-02-23 | Stop reason: HOSPADM

## 2025-02-20 RX ADMIN — ASPIRIN 81 MG: 81 TABLET, COATED ORAL at 21:48

## 2025-02-20 RX ADMIN — Medication 1 G: at 08:27

## 2025-02-20 RX ADMIN — Medication 1 G: at 17:52

## 2025-02-20 RX ADMIN — ATORVASTATIN CALCIUM 10 MG: 10 TABLET, FILM COATED ORAL at 00:22

## 2025-02-20 RX ADMIN — ONDANSETRON 4 MG: 2 INJECTION, SOLUTION INTRAMUSCULAR; INTRAVENOUS at 00:40

## 2025-02-20 RX ADMIN — ATORVASTATIN CALCIUM 10 MG: 10 TABLET, FILM COATED ORAL at 17:52

## 2025-02-20 RX ADMIN — Medication 15 G: at 00:21

## 2025-02-20 RX ADMIN — SODIUM CHLORIDE, PRESERVATIVE FREE 10 ML: 5 INJECTION INTRAVENOUS at 21:48

## 2025-02-20 RX ADMIN — SODIUM CHLORIDE, PRESERVATIVE FREE 10 ML: 5 INJECTION INTRAVENOUS at 00:31

## 2025-02-20 RX ADMIN — LISINOPRIL 20 MG: 20 TABLET ORAL at 21:48

## 2025-02-20 RX ADMIN — LEVOTHYROXINE SODIUM 88 MCG: 0.09 TABLET ORAL at 06:15

## 2025-02-20 RX ADMIN — LISINOPRIL 20 MG: 20 TABLET ORAL at 00:22

## 2025-02-20 RX ADMIN — Medication 15 G: at 21:48

## 2025-02-20 RX ADMIN — ASPIRIN 81 MG: 81 TABLET, COATED ORAL at 08:27

## 2025-02-20 RX ADMIN — CALCIUM CARBONATE-VITAMIN D TAB 500 MG-200 UNIT 1 TABLET: 500-200 TAB at 13:24

## 2025-02-20 RX ADMIN — LISINOPRIL 20 MG: 20 TABLET ORAL at 08:27

## 2025-02-20 RX ADMIN — Medication 1 G: at 00:22

## 2025-02-20 RX ADMIN — ENOXAPARIN SODIUM 40 MG: 100 INJECTION SUBCUTANEOUS at 08:26

## 2025-02-20 RX ADMIN — SODIUM CHLORIDE, PRESERVATIVE FREE 10 ML: 5 INJECTION INTRAVENOUS at 08:36

## 2025-02-20 RX ADMIN — FUROSEMIDE 20 MG: 20 TABLET ORAL at 00:22

## 2025-02-20 RX ADMIN — Medication 15 G: at 08:27

## 2025-02-20 ASSESSMENT — PAIN SCALES - GENERAL
PAINLEVEL_OUTOF10: 0

## 2025-02-20 NOTE — ASSESSMENT & PLAN NOTE
- Was diagnosed with SIADH last month.  Patient reports compliance with home Urea packs and fluid restriction  - Na today as outpatient was 123.  Currently 125  - Will continue home urea.  Add salt tablets and a dose of furosemide tonight  - Check urine sodium and osmolality  - Consult with Nephrology  - Trend Na levels

## 2025-02-20 NOTE — H&P
Hospitalist History and Physical   Admit Date:  2025  6:42 PM   Name:  Violet Siddiqui   Age:  85 y.o.  Sex:  female  :  1940   MRN:  [id number]     Presenting Complaint: low sodium  Reason(s) for Admission: Hyponatremia [E87.1]     History of Present Illness:   Violet Siddiqui is a 85 y.o. female with medical history of HTN, hypothryoidism, CKD3 who presented to ED with low sodium level.  Patient was hospitalized from -25 and found to have SIADH at that time.  Her home sertraline was discontinued and she was started on Urea with outpatient follow up with Nephrology scheduled.  Patient feels as though she has been having some memory issues recently.  She saw her PCP who conducted a MMSE in office which was normal.  Outpatient labs resulted with low sodium again and she was advised come to the ER for further care.  Metabolic panel unable to be obtained yet, but Hospitalist asked to admit.    Review of Systems:  10 systems reviewed and negative except as noted in HPI.  Assessment & Plan:   * Hyponatremia  Assessment & Plan  - Was diagnosed with SIADH last month.  Patient reports compliance with home Urea packs and fluid restriction  - Na today as outpatient was 123.  Currently 125  - Will continue home urea.  Add salt tablets and a dose of furosemide tonight  - Check urine sodium and osmolality  - Consult with Nephrology  - Trend Na levels    SIADH (syndrome of inappropriate ADH production)  Assessment & Plan  - Recent diagnosis  - Recurrent hyponatremia despite fluid restriction and compliance with taking Urea  - Will add salt tabs and a dose of lasix tonight  - Check urine sodium and osmolality  - Consult with Nephrology    H/O carotid endarterectomy  Assessment & Plan  - Of note  - Follows with Dr. Villatoro    Chronic renal disease, stage III (HCC) [847147]  Assessment & Plan  - Renal function appears at baseline    Hypothyroidism  Assessment & Plan  - Continue home

## 2025-02-20 NOTE — PLAN OF CARE
Problem: Chronic Conditions and Co-morbidities  Goal: Patient's chronic conditions and co-morbidity symptoms are monitored and maintained or improved  2/20/2025 1125 by Preeti Manzano RN  Outcome: Progressing  Flowsheets (Taken 2/20/2025 0725)  Care Plan - Patient's Chronic Conditions and Co-Morbidity Symptoms are Monitored and Maintained or Improved: Monitor and assess patient's chronic conditions and comorbid symptoms for stability, deterioration, or improvement  2/20/2025 0158 by Melina Trinh, RN  Outcome: Progressing  Flowsheets (Taken 2/19/2025 2330)  Care Plan - Patient's Chronic Conditions and Co-Morbidity Symptoms are Monitored and Maintained or Improved:   Monitor and assess patient's chronic conditions and comorbid symptoms for stability, deterioration, or improvement   Collaborate with multidisciplinary team to address chronic and comorbid conditions and prevent exacerbation or deterioration   Update acute care plan with appropriate goals if chronic or comorbid symptoms are exacerbated and prevent overall improvement and discharge     Problem: Discharge Planning  Goal: Discharge to home or other facility with appropriate resources  2/20/2025 1125 by Preeti Manzano RN  Outcome: Progressing  Flowsheets (Taken 2/20/2025 0725)  Discharge to home or other facility with appropriate resources:   Identify barriers to discharge with patient and caregiver   Arrange for needed discharge resources and transportation as appropriate   Identify discharge learning needs (meds, wound care, etc)   Refer to discharge planning if patient needs post-hospital services based on physician order or complex needs related to functional status, cognitive ability or social support system  2/20/2025 0158 by Melina Trinh, RN  Outcome: Progressing  Flowsheets  Taken 2/19/2025 0052  Discharge to home or other facility with appropriate resources:   Identify barriers to discharge with patient and caregiver   Identify discharge

## 2025-02-20 NOTE — CONSULTS
40.0 years (60.0 ttl pk-yrs)     Types: Cigarettes     Quit date: 3/21/2014     Years since quitting: 10.9    Smokeless tobacco: Never   Substance Use Topics    Alcohol use: No      Family History   Problem Relation Age of Onset    Heart Disease Brother         valve replaced    Thyroid Disease Paternal Grandmother         hypothyroidism    Cancer Mother         at age 90    Breast Cancer Mother 85    Heart Disease Mother     Lung Disease Sister     Heart Disease Sister     Other Sister         claudication    Stroke Father 64         at 65 after stroke    Diabetes Maternal Grandmother         controlled with diet        Review of Systems      Objective:     Vitals:    25 0019 25 0124 25 0313 25 0732   BP: (!) 162/81  (!) 161/79 (!) 158/81   Pulse: 89 94 85 91   Resp: 16  16 18   Temp: 97.3 °F (36.3 °C)  97.9 °F (36.6 °C) 97.6 °F (36.4 °C)   TempSrc: Oral  Oral Oral   SpO2: 97%  100% 100%   Weight:       Height:           Intake/Output Summary (Last 24 hours) at 2025 1037  Last data filed at 2025 0834  Gross per 24 hour   Intake 420 ml   Output 350 ml   Net 70 ml       Physical Exam  GEN: in no distress, alert and oriented  HEENT: anicteric sclerae, eomi. Oropharynx without lesions.  Mucous membranes are moist.  Neck: supple without JVD  CV: regular rate and rhythm, no murmur, no rub  Lung: clear bilaterally, lungs expand symmetrically  Chest wall:  normal appearance  Abd:  soft, nontender, bowel sounds present, no hepatosplenomegaly  Ext:  no clubbing, no cyanosis, no edema  Neurologic:  nonfocal  Genitourinary:  bladder nonpalpable  Skin: no rashes, no purpura, no ecchymoses        Data Review:   Recent Labs     25  0547   WBC 11.1 12.3*   HGB 14.1 14.5   HCT 40.7 40.5    345     Recent Labs     25  0815 25  2134 25  0547   * 125* 123*   K 4.1 4.1 4.0   CL 84* 89* 88*   CO2 29 28 25   BUN 27* 26* 31*   CREATININE 0.84 0.80

## 2025-02-20 NOTE — ACP (ADVANCE CARE PLANNING)
Advance Care Planning     Advance Care Planning Activator (Inpatient)  Conversation Note      Health Care Decision Maker: No LW/HCPOA on file, patient aware legal next of kin remain decision maker until document provided.      Current Designated Health Care Decision Maker:     Primary Decision Maker: Kamaljit Siddiqui III - Spouse - [phone]    Secondary Decision Maker: Melanie Sandra - Child - [phone]    Secondary Decision Maker: Dorian Garzon - Child - [phone]      Care Preferences Full Code per MD order

## 2025-02-20 NOTE — ASSESSMENT & PLAN NOTE
- Recent diagnosis  - Recurrent hyponatremia despite fluid restriction and compliance with taking Urea  - Will add salt tabs and a dose of lasix tonight  - Check urine sodium and osmolality  - Consult with Nephrology

## 2025-02-20 NOTE — CARE COORDINATION
Case Management Assessment  Initial Evaluation    Date/Time of Evaluation: 2/20/2025 9:19 AM  Assessment Completed by: HERO ABREU    If patient is discharged prior to next notation, then this note serves as note for discharge by case management.    Patient Name: Violet Siddiqui                   YOB: 1940  Diagnosis:   Hyponatremia [E87.1]                   Date / Time: 2/19/2025  6:42 PM    Patient Admission Status: Inpatient   Readmission Risk (Low < 19, Mod (19-27), High > 27): Readmission Risk Score: 15.1        Current PCP: Zuleika Medrano MD  PCP verified by CM? (P) Yes    Chart Reviewed: Yes      History Provided by: (P) Patient  Patient Orientation: (P) Alert and Oriented    Patient Cognition: (P) Alert    Hospitalization in the last 30 days (Readmission):  Yes    If yes, Readmission Assessment in  Navigator will be completed.    Advance Directives:      Code Status: Full Code   Patient's Primary Decision Maker is: (P) Legal Next of Kin    Primary Decision Maker: Kamaljit Siddiqui ALANA - Spouse - [phone]    Secondary Decision Maker: Melanie Sandra - Child - [phone]    Secondary Decision Maker: Dorian Garzon - Child - [phone]    Discharge Planning:    Patient lives with: (P) Spouse/Significant Other Type of Home: (P) House  Primary Care Giver: (P) Self  Patient Support Systems include: (P) Spouse/Significant Other, Children   Current Financial resources: (P) Medicare, Other (Comment) (Medicare and [facility])  Current community resources: (P) None  Current services prior to admission: (P) Durable Medical Equipment            Current DME: (P) Cane, Shower Chair, Walker            Type of Home Care services:  None    ADLS  Prior functional level: (P) Mobility, Assistance with the following:  Current functional level: (P) Mobility, Assistance with the following:    PT AM-PAC:   /24  OT AM-PAC:   /24    Family can provide assistance at DC: (P) Yes  Would you like Case Management

## 2025-02-21 LAB
ERYTHROCYTE [DISTWIDTH] IN BLOOD BY AUTOMATED COUNT: 12 % (ref 11.9–14.6)
HCT VFR BLD AUTO: 36.6 % (ref 35.8–46.3)
HGB BLD-MCNC: 12.7 G/DL (ref 11.7–15.4)
MCH RBC QN AUTO: 30.2 PG (ref 26.1–32.9)
MCHC RBC AUTO-ENTMCNC: 34.7 G/DL (ref 31.4–35)
MCV RBC AUTO: 87.1 FL (ref 82–102)
NRBC # BLD: 0 K/UL (ref 0–0.2)
PLATELET # BLD AUTO: 296 K/UL (ref 150–450)
PMV BLD AUTO: 9.5 FL (ref 9.4–12.3)
RBC # BLD AUTO: 4.2 M/UL (ref 4.05–5.2)
SODIUM SERPL-SCNC: 123 MMOL/L (ref 136–145)
SODIUM SERPL-SCNC: 124 MMOL/L (ref 136–145)
SODIUM SERPL-SCNC: 129 MMOL/L (ref 136–145)
WBC # BLD AUTO: 10.5 K/UL (ref 4.3–11.1)

## 2025-02-21 PROCEDURE — 97165 OT EVAL LOW COMPLEX 30 MIN: CPT

## 2025-02-21 PROCEDURE — 36415 COLL VENOUS BLD VENIPUNCTURE: CPT

## 2025-02-21 PROCEDURE — 1100000003 HC PRIVATE W/ TELEMETRY

## 2025-02-21 PROCEDURE — 85027 COMPLETE CBC AUTOMATED: CPT

## 2025-02-21 PROCEDURE — 97535 SELF CARE MNGMENT TRAINING: CPT

## 2025-02-21 PROCEDURE — 6370000000 HC RX 637 (ALT 250 FOR IP): Performed by: FAMILY MEDICINE

## 2025-02-21 PROCEDURE — 84295 ASSAY OF SERUM SODIUM: CPT

## 2025-02-21 PROCEDURE — 6360000002 HC RX W HCPCS: Performed by: FAMILY MEDICINE

## 2025-02-21 PROCEDURE — 6370000000 HC RX 637 (ALT 250 FOR IP): Performed by: PHYSICIAN ASSISTANT

## 2025-02-21 PROCEDURE — 2500000003 HC RX 250 WO HCPCS: Performed by: FAMILY MEDICINE

## 2025-02-21 RX ORDER — DEMECLOCYCLINE HYDROCHLORIDE 150 MG/1
150 TABLET, FILM COATED ORAL EVERY 12 HOURS SCHEDULED
Status: DISCONTINUED | OUTPATIENT
Start: 2025-02-21 | End: 2025-02-21 | Stop reason: RX

## 2025-02-21 RX ADMIN — LISINOPRIL 20 MG: 20 TABLET ORAL at 20:35

## 2025-02-21 RX ADMIN — SODIUM CHLORIDE, PRESERVATIVE FREE 10 ML: 5 INJECTION INTRAVENOUS at 20:38

## 2025-02-21 RX ADMIN — LEVOTHYROXINE SODIUM 88 MCG: 0.09 TABLET ORAL at 06:06

## 2025-02-21 RX ADMIN — ENOXAPARIN SODIUM 40 MG: 100 INJECTION SUBCUTANEOUS at 07:52

## 2025-02-21 RX ADMIN — LISINOPRIL 20 MG: 20 TABLET ORAL at 07:52

## 2025-02-21 RX ADMIN — Medication 1 G: at 17:27

## 2025-02-21 RX ADMIN — Medication 15 G: at 07:51

## 2025-02-21 RX ADMIN — SODIUM CHLORIDE, PRESERVATIVE FREE 10 ML: 5 INJECTION INTRAVENOUS at 08:20

## 2025-02-21 RX ADMIN — ASPIRIN 81 MG: 81 TABLET, COATED ORAL at 20:35

## 2025-02-21 RX ADMIN — ASPIRIN 81 MG: 81 TABLET, COATED ORAL at 07:52

## 2025-02-21 RX ADMIN — CALCIUM CARBONATE-VITAMIN D TAB 500 MG-200 UNIT 1 TABLET: 500-200 TAB at 11:07

## 2025-02-21 RX ADMIN — Medication 1 G: at 07:52

## 2025-02-21 RX ADMIN — Medication 15 G: at 20:35

## 2025-02-21 RX ADMIN — ATORVASTATIN CALCIUM 10 MG: 10 TABLET, FILM COATED ORAL at 17:27

## 2025-02-21 ASSESSMENT — PAIN SCALES - GENERAL
PAINLEVEL_OUTOF10: 0
PAINLEVEL_OUTOF10: 0

## 2025-02-21 NOTE — CARE COORDINATION
MSN, Cm:  patient's Na at 124 this AM with labs; Nephrology following.  [facility]  Case Management will continue to follow.

## 2025-02-21 NOTE — PLAN OF CARE
Problem: Chronic Conditions and Co-morbidities  Goal: Patient's chronic conditions and co-morbidity symptoms are monitored and maintained or improved  Outcome: Progressing  Flowsheets (Taken 2/21/2025 0713)  Care Plan - Patient's Chronic Conditions and Co-Morbidity Symptoms are Monitored and Maintained or Improved: Monitor and assess patient's chronic conditions and comorbid symptoms for stability, deterioration, or improvement     Problem: Discharge Planning  Goal: Discharge to home or other facility with appropriate resources  Outcome: Progressing  Flowsheets (Taken 2/21/2025 0713)  Discharge to home or other facility with appropriate resources:   Identify barriers to discharge with patient and caregiver   Arrange for needed discharge resources and transportation as appropriate   Identify discharge learning needs (meds, wound care, etc)   Refer to discharge planning if patient needs post-hospital services based on physician order or complex needs related to functional status, cognitive ability or social support system     Problem: Pain  Goal: Verbalizes/displays adequate comfort level or baseline comfort level  Outcome: Progressing  Flowsheets (Taken 2/21/2025 0713)  Verbalizes/displays adequate comfort level or baseline comfort level: Encourage patient to monitor pain and request assistance     Problem: Skin/Tissue Integrity  Goal: Skin integrity remains intact  Description: 1.  Monitor for areas of redness and/or skin breakdown  2.  Assess vascular access sites hourly  3.  Every 4-6 hours minimum:  Change oxygen saturation probe site  4.  Every 4-6 hours:  If on nasal continuous positive airway pressure, respiratory therapy assess nares and determine need for appliance change or resting period  Outcome: Progressing  Flowsheets (Taken 2/21/2025 0713)  Skin Integrity Remains Intact: Monitor for areas of redness and/or skin breakdown     Problem: Safety - Adult  Goal: Free from fall injury  Outcome: Progressing

## 2025-02-22 LAB
ANION GAP SERPL CALC-SCNC: 8 MMOL/L (ref 7–16)
BUN SERPL-MCNC: 36 MG/DL (ref 8–23)
CALCIUM SERPL-MCNC: 8.8 MG/DL (ref 8.8–10.2)
CHLORIDE SERPL-SCNC: 95 MMOL/L (ref 98–107)
CO2 SERPL-SCNC: 26 MMOL/L (ref 20–29)
CREAT SERPL-MCNC: 0.75 MG/DL (ref 0.6–1.1)
GLUCOSE SERPL-MCNC: 95 MG/DL (ref 70–99)
POTASSIUM SERPL-SCNC: 3.9 MMOL/L (ref 3.5–5.1)
SODIUM SERPL-SCNC: 125 MMOL/L (ref 136–145)
SODIUM SERPL-SCNC: 129 MMOL/L (ref 136–145)
SODIUM SERPL-SCNC: 130 MMOL/L (ref 136–145)
SODIUM SERPL-SCNC: 133 MMOL/L (ref 136–145)
SODIUM SERPL-SCNC: 134 MMOL/L (ref 136–145)

## 2025-02-22 PROCEDURE — 97530 THERAPEUTIC ACTIVITIES: CPT

## 2025-02-22 PROCEDURE — 6370000000 HC RX 637 (ALT 250 FOR IP): Performed by: PHYSICIAN ASSISTANT

## 2025-02-22 PROCEDURE — 80048 BASIC METABOLIC PNL TOTAL CA: CPT

## 2025-02-22 PROCEDURE — 6370000000 HC RX 637 (ALT 250 FOR IP): Performed by: FAMILY MEDICINE

## 2025-02-22 PROCEDURE — 2500000003 HC RX 250 WO HCPCS: Performed by: FAMILY MEDICINE

## 2025-02-22 PROCEDURE — 1100000000 HC RM PRIVATE

## 2025-02-22 PROCEDURE — 6360000002 HC RX W HCPCS: Performed by: FAMILY MEDICINE

## 2025-02-22 PROCEDURE — 84295 ASSAY OF SERUM SODIUM: CPT

## 2025-02-22 PROCEDURE — 36415 COLL VENOUS BLD VENIPUNCTURE: CPT

## 2025-02-22 PROCEDURE — 6370000000 HC RX 637 (ALT 250 FOR IP)

## 2025-02-22 RX ORDER — SODIUM CHLORIDE 1 G/1
1 TABLET ORAL
Status: DISCONTINUED | OUTPATIENT
Start: 2025-02-22 | End: 2025-02-23 | Stop reason: HOSPADM

## 2025-02-22 RX ADMIN — LISINOPRIL 20 MG: 20 TABLET ORAL at 08:35

## 2025-02-22 RX ADMIN — Medication 1 G: at 17:14

## 2025-02-22 RX ADMIN — SODIUM CHLORIDE, PRESERVATIVE FREE 10 ML: 5 INJECTION INTRAVENOUS at 08:35

## 2025-02-22 RX ADMIN — ACETAMINOPHEN 650 MG: 325 TABLET ORAL at 08:46

## 2025-02-22 RX ADMIN — Medication 15 G: at 08:35

## 2025-02-22 RX ADMIN — ATORVASTATIN CALCIUM 10 MG: 10 TABLET, FILM COATED ORAL at 17:14

## 2025-02-22 RX ADMIN — CALCIUM CARBONATE-VITAMIN D TAB 500 MG-200 UNIT 1 TABLET: 500-200 TAB at 11:46

## 2025-02-22 RX ADMIN — TRAMADOL HYDROCHLORIDE 50 MG: 50 TABLET, COATED ORAL at 12:54

## 2025-02-22 RX ADMIN — Medication 1 G: at 12:54

## 2025-02-22 RX ADMIN — ENOXAPARIN SODIUM 40 MG: 100 INJECTION SUBCUTANEOUS at 08:35

## 2025-02-22 RX ADMIN — SODIUM CHLORIDE, PRESERVATIVE FREE 10 ML: 5 INJECTION INTRAVENOUS at 20:27

## 2025-02-22 RX ADMIN — LEVOTHYROXINE SODIUM 88 MCG: 0.09 TABLET ORAL at 05:17

## 2025-02-22 RX ADMIN — ASPIRIN 81 MG: 81 TABLET, COATED ORAL at 08:35

## 2025-02-22 RX ADMIN — LISINOPRIL 20 MG: 20 TABLET ORAL at 20:22

## 2025-02-22 RX ADMIN — Medication 1 G: at 08:35

## 2025-02-22 RX ADMIN — ASPIRIN 81 MG: 81 TABLET, COATED ORAL at 20:22

## 2025-02-22 RX ADMIN — Medication 15 G: at 20:23

## 2025-02-22 ASSESSMENT — PAIN DESCRIPTION - ORIENTATION
ORIENTATION: RIGHT;LEFT
ORIENTATION: RIGHT;LEFT

## 2025-02-22 ASSESSMENT — PAIN DESCRIPTION - FREQUENCY
FREQUENCY: INTERMITTENT
FREQUENCY: INTERMITTENT

## 2025-02-22 ASSESSMENT — PAIN - FUNCTIONAL ASSESSMENT
PAIN_FUNCTIONAL_ASSESSMENT: ACTIVITIES ARE NOT PREVENTED
PAIN_FUNCTIONAL_ASSESSMENT: ACTIVITIES ARE NOT PREVENTED

## 2025-02-22 ASSESSMENT — PAIN DESCRIPTION - ONSET
ONSET: PROGRESSIVE
ONSET: ON-GOING

## 2025-02-22 ASSESSMENT — PAIN SCALES - GENERAL
PAINLEVEL_OUTOF10: 0
PAINLEVEL_OUTOF10: 0
PAINLEVEL_OUTOF10: 5
PAINLEVEL_OUTOF10: 2
PAINLEVEL_OUTOF10: 0

## 2025-02-22 ASSESSMENT — PAIN DESCRIPTION - PAIN TYPE
TYPE: ACUTE PAIN
TYPE: ACUTE PAIN

## 2025-02-22 ASSESSMENT — PAIN DESCRIPTION - DESCRIPTORS
DESCRIPTORS: CRAMPING;ACHING
DESCRIPTORS: CRAMPING

## 2025-02-22 ASSESSMENT — PAIN DESCRIPTION - LOCATION
LOCATION: LEG
LOCATION: LEG

## 2025-02-22 NOTE — WOUND CARE
Consulted for R buttock. Pt states wound started off as a pimple that she opened by scratching and just hasn't healed yet. Pt ambulatory w/ walker; wound likely mix of pressure w/ location.    R buttock 0.5x0.5x0.1cm, dry pink/red/fibrin, blanchable erythema, no drainage/odor. Recommend normal bathing or incontinence care, pink silicone border foam every other day&PRN.    
no

## 2025-02-22 NOTE — PLAN OF CARE
Problem: Chronic Conditions and Co-morbidities  Goal: Patient's chronic conditions and co-morbidity symptoms are monitored and maintained or improved  2/22/2025 0000 by Tahira Thornton RN  Outcome: Progressing  Flowsheets (Taken 2/21/2025 2001)  Care Plan - Patient's Chronic Conditions and Co-Morbidity Symptoms are Monitored and Maintained or Improved:   Monitor and assess patient's chronic conditions and comorbid symptoms for stability, deterioration, or improvement   Collaborate with multidisciplinary team to address chronic and comorbid conditions and prevent exacerbation or deterioration   Update acute care plan with appropriate goals if chronic or comorbid symptoms are exacerbated and prevent overall improvement and discharge  2/21/2025 1504 by Preeti Manzano RN  Outcome: Progressing  Flowsheets (Taken 2/21/2025 0713)  Care Plan - Patient's Chronic Conditions and Co-Morbidity Symptoms are Monitored and Maintained or Improved: Monitor and assess patient's chronic conditions and comorbid symptoms for stability, deterioration, or improvement     Problem: Discharge Planning  Goal: Discharge to home or other facility with appropriate resources  2/22/2025 0000 by Tahira Thornton RN  Outcome: Progressing  Flowsheets (Taken 2/21/2025 2001)  Discharge to home or other facility with appropriate resources:   Identify barriers to discharge with patient and caregiver   Arrange for needed discharge resources and transportation as appropriate   Identify discharge learning needs (meds, wound care, etc)   Refer to discharge planning if patient needs post-hospital services based on physician order or complex needs related to functional status, cognitive ability or social support system  2/21/2025 1504 by Preeti Manzano RN  Outcome: Progressing  Flowsheets (Taken 2/21/2025 0713)  Discharge to home or other facility with appropriate resources:   Identify barriers to discharge with patient and caregiver   Arrange for

## 2025-02-23 VITALS
BODY MASS INDEX: 29.66 KG/M2 | DIASTOLIC BLOOD PRESSURE: 66 MMHG | OXYGEN SATURATION: 99 % | RESPIRATION RATE: 18 BRPM | TEMPERATURE: 97.7 F | SYSTOLIC BLOOD PRESSURE: 170 MMHG | HEART RATE: 89 BPM | WEIGHT: 157.1 LBS | HEIGHT: 61 IN

## 2025-02-23 LAB
ANION GAP SERPL CALC-SCNC: 10 MMOL/L (ref 7–16)
BUN SERPL-MCNC: 58 MG/DL (ref 8–23)
CALCIUM SERPL-MCNC: 9.5 MG/DL (ref 8.8–10.2)
CHLORIDE SERPL-SCNC: 100 MMOL/L (ref 98–107)
CO2 SERPL-SCNC: 25 MMOL/L (ref 20–29)
CREAT SERPL-MCNC: 1.01 MG/DL (ref 0.6–1.1)
GLUCOSE SERPL-MCNC: 113 MG/DL (ref 70–99)
POTASSIUM SERPL-SCNC: 3.7 MMOL/L (ref 3.5–5.1)
SODIUM SERPL-SCNC: 135 MMOL/L (ref 136–145)

## 2025-02-23 PROCEDURE — 6360000002 HC RX W HCPCS: Performed by: FAMILY MEDICINE

## 2025-02-23 PROCEDURE — 6370000000 HC RX 637 (ALT 250 FOR IP)

## 2025-02-23 PROCEDURE — 2500000003 HC RX 250 WO HCPCS: Performed by: FAMILY MEDICINE

## 2025-02-23 PROCEDURE — 36415 COLL VENOUS BLD VENIPUNCTURE: CPT

## 2025-02-23 PROCEDURE — 80048 BASIC METABOLIC PNL TOTAL CA: CPT

## 2025-02-23 PROCEDURE — 6370000000 HC RX 637 (ALT 250 FOR IP): Performed by: FAMILY MEDICINE

## 2025-02-23 RX ORDER — SODIUM CHLORIDE 1 G/1
1 TABLET ORAL
Qty: 90 TABLET | Refills: 3 | Status: SHIPPED | OUTPATIENT
Start: 2025-02-23

## 2025-02-23 RX ADMIN — Medication 15 G: at 08:31

## 2025-02-23 RX ADMIN — LEVOTHYROXINE SODIUM 88 MCG: 0.09 TABLET ORAL at 06:22

## 2025-02-23 RX ADMIN — ENOXAPARIN SODIUM 40 MG: 100 INJECTION SUBCUTANEOUS at 08:31

## 2025-02-23 RX ADMIN — Medication 1 G: at 08:31

## 2025-02-23 RX ADMIN — LISINOPRIL 20 MG: 20 TABLET ORAL at 08:31

## 2025-02-23 RX ADMIN — SODIUM CHLORIDE, PRESERVATIVE FREE 10 ML: 5 INJECTION INTRAVENOUS at 08:35

## 2025-02-23 RX ADMIN — ASPIRIN 81 MG: 81 TABLET, COATED ORAL at 08:31

## 2025-02-23 NOTE — DISCHARGE SUMMARY
Hospitalist Discharge Summary   Admit Date:  2025  6:42 PM   DC Note date: 2025  Name:  Violet Siddiqui   Age:  85 y.o.  Sex:  female  :  1940   MRN:  [id number]   Room:  [facility]  PCP:  Zuleika Medrano MD    Presenting Complaint: Abnormal Lab     Initial Admission Diagnosis: Hyponatremia [E87.1]     Problem List for this Hospitalization (present on admission):    Principal Problem:    Hyponatremia  Active Problems:    H/O carotid endarterectomy    Primary hypertension    Type 2 diabetes mellitus with peripheral vascular disease (HCC)    Hypothyroidism    Chronic renal disease, stage III (HCC) [134354]    SIADH (syndrome of inappropriate ADH production)  Resolved Problems:    * No resolved hospital problems. *      Hospital Course:  85-year-old female with past medical history of recently diagnosed SIADH, hypertension, peripheral vascular disease status post carotid endarterectomy, hypothyroidism who presented to the ER from her PCP due to hyponatremia.  She was found to have sodium in the low 120s.  She was recently diagnosed with SIADH when she was hospitalized in late January due to her Zoloft.  This medication has been stopped.  Nephrology was consulted.  Patient continued on home urea and fluid restriction.  She was also started on salt tablets.  She had gradual improvement in her sodium level of 134 at time of discharge.  Her goal is likely around 130.  Advised the patient to continue to adhere to the fluid restriction, limiting her amount of free water and tea.  She understands this treatment plan.  This was discussed with the patient and all questions answered.  She is in agreement to discharge home at this time.  I advised the patient to follow-up with her primary care doctor approximately 1 week to repeat sodium level.      Disposition: Home with Home Health  Diet: ORAL HYDRATION; Low/No-sugar Electrolyte Solution; 300 ml (10 oz)  ADULT DIET; Regular; 1000 ml; Please do not bring

## 2025-02-23 NOTE — PROGRESS NOTES
Hospitalist Progress Note   Admit Date:  2025  6:42 PM   Name:  Violet Siddiqui   Age:  85 y.o.  Sex:  female  :  1940   MRN:  [id number]   Room:  [facility]    Presenting/Chief Complaint: Abnormal Lab     Reason(s) for Admission: Hyponatremia [E87.1]     HPI:   Violet Siddiqui is a 85 y.o. female with medical history of HLD, HTN, IBS, PVD, carotid stenosis and recent diagnosis of SIADH who presented to the hospital on  after being referred to the ER from her PCP  hyponatremia. She went to her PCP due to nausea and memory issues and labs were checked. This was how they found hyponatremia.   She was recently admitted from 25-25, was diagnosed with SIADH and discharged on UreNa. Her Zoloft was stopped (previously weaned down) and she was started on a fluid restriction. When she left the hospital last time her Na was 126. When she came to the hospital this time it was 123.     Subjective & 24hr Events:   Patient seen this morning on rounds.  Says she is feeling well and ready to go home, but wants to make sure her sodium is stable before hand.  Tried to order demeclocycline for patient yesterday, but not carried pharmacy.  Sodium improved to 129 this morning.    Assessment & Plan:     SIADH  - Likely due to sertraline.  Medication previously discontinued.  Sodium approximately 120 on admission, likely contributing to current symptoms  - Nephrology consulted.  Already on fluid restriction, salt tablets, and urea.  Discussed the importance of restricting free water, tea, and juices.  Okay to have more liberal intake of electrolyte containing beverages like Gatorade and Pedialyte  - Discussed with nephrology.  Tried demeclocycline yesterday, but not carried in the pharmacy  - Sodium 129 this morning with goal around 130.  Plan to increase salt tabs to 3 times daily with meals and monitor sodium throughout the day today.  If remains stable, can likely discharge home 
       Hospitalist Progress Note   Admit Date:  2025  6:42 PM   Name:  Violet Siddiqui   Age:  85 y.o.  Sex:  female  :  1940   MRN:  [id number]   Room:  [facility]    Presenting/Chief Complaint: Abnormal Lab     Reason(s) for Admission: Hyponatremia [E87.1]     HPI:   Violet Siddiqui is a 85 y.o. female with medical history of HLD, HTN, IBS, PVD, carotid stenosis and recent diagnosis of SIADH who presented to the hospital on  after being referred to the ER from her PCP  hyponatremia. She went to her PCP due to nausea and memory issues and labs were checked. This was how they found hyponatremia.   She was recently admitted from 25-25, was diagnosed with SIADH and discharged on UreNa. Her Zoloft was stopped (previously weaned down) and she was started on a fluid restriction. When she left the hospital last time her Na was 126. When she came to the hospital this time it was 123.     Subjective & 24hr Events:   Patient seen this morning on rounds lying in bedside chair.  Family at bedside updated on current treatment plan.  Sodium 123 this morning.  Discussed with nephrology.  Already on fluid restriction, salt tablets, and urea.  Could consider tolvaptan, but this medication is very expensive.  Will trial the patient on demeclocycline to see if this helps.    Assessment & Plan:     SIADH  - Likely due to sertraline.  Medication previously discontinued.  Sodium approximately 120 on admission, likely contributing to current symptoms  - Nephrology consulted.  Already on fluid restriction, salt tablets, and urea.  Discussed the importance of restricting free water, tea, and juices.  Okay to have more liberal intake of electrolyte containing beverages like Gatorade and Pedialyte  - Sodium 123 this morning and not improving.  Discussed with nephrology.  Will trial demeclocycline to see if this helps her sodium  - Continue to monitor sodium every 6 hours    Type 2 diabetes, diet 
    [facility] Progress Note    Follow-Up on: Hyponatremia    ROS:  Gen - no fever, no chills, appetite unchanged  CV - no chest pain, no palpitation  Lung - no shortness of breath, no cough  Abd - no tenderness, no nausea/vomiting, no diarrhea  Ext - no edema    Exam:  Vitals:    02/22/25 0000 02/22/25 0355 02/22/25 0721 02/22/25 1106   BP: 118/72 (!) 153/93 138/63 111/79   Pulse: 89 92 89 83   Resp: 18 20 18 20   Temp: 97.8 °F (36.6 °C) 97.5 °F (36.4 °C) 97.9 °F (36.6 °C) 98.1 °F (36.7 °C)   TempSrc: Oral Oral Oral Oral   SpO2: 98% 92% 94% 97%   Weight:       Height:             Intake/Output Summary (Last 24 hours) at 2/22/2025 1124  Last data filed at 2/22/2025 0815  Gross per 24 hour   Intake 100 ml   Output 900 ml   Net -800 ml       Wt Readings from Last 3 Encounters:   02/20/25 71.3 kg (157 lb 1.6 oz)   01/30/25 68.5 kg (151 lb)   01/23/25 68.9 kg (152 lb)       GEN - in no distress  CV - regular, no murmur, no rub  Lung - clear bilaterally  Abd - soft, nontender  Ext - no edema    Recent Labs     02/19/25  2032 02/20/25  0547 02/21/25  0500   WBC 11.1 12.3* 10.5   HGB 14.1 14.5 12.7   HCT 40.7 40.5 36.6    345 296        Recent Labs     02/19/25  2134 02/20/25  0547 02/20/25  1023 02/21/25  1718 02/21/25  2348 02/22/25  0511   * 123*   < > 129* 125* 129*   K 4.1 4.0  --   --   --  3.9   CL 89* 88*  --   --   --  95*   CO2 28 25  --   --   --  26   BUN 26* 31*  --   --   --  36*   CREATININE 0.80 0.72  --   --   --  0.75   CALCIUM 9.7 9.1  --   --   --  8.8   MG 2.0  --   --   --   --   --    ALBUMIN 3.5  --   --   --   --   --     < > = values in this interval not displayed.       Assessment / Plan:  Principal Problem:    Hyponatremia  Active Problems:    H/O carotid endarterectomy    Primary hypertension    Type 2 diabetes mellitus with peripheral vascular disease (HCC)    Hypothyroidism    Chronic renal disease, stage III (HCC) [579793]    SIADH (syndrome of inappropriate ADH 
    [facility] Progress Note    Follow-Up on: Hyponatremia    ROS:  Gen - no fever, no chills, appetite unchanged  CV - no chest pain, no palpitation  Lung - no shortness of breath, no cough  Abd - no tenderness, no nausea/vomiting, no diarrhea  Ext - no edema    Exam:  Vitals:    02/22/25 2022 02/22/25 2256 02/23/25 0429 02/23/25 0700   BP: (!) 130/59 (!) 142/69 (!) 142/80 (!) 170/66   Pulse:  96 97 89   Resp:  20 18 18   Temp:  97.5 °F (36.4 °C) 97.5 °F (36.4 °C) 97.7 °F (36.5 °C)   TempSrc:  Oral Oral Oral   SpO2:  94% 96% 99%   Weight:       Height:             Intake/Output Summary (Last 24 hours) at 2/23/2025 1028  Last data filed at 2/23/2025 1025  Gross per 24 hour   Intake 400 ml   Output 500 ml   Net -100 ml       Wt Readings from Last 3 Encounters:   02/20/25 71.3 kg (157 lb 1.6 oz)   01/30/25 68.5 kg (151 lb)   01/23/25 68.9 kg (152 lb)       GEN - in no distress  CV - regular, no murmur, no rub  Lung - clear bilaterally  Abd - soft, nontender  Ext - no edema    Recent Labs     02/21/25  0500   WBC 10.5   HGB 12.7   HCT 36.6           Recent Labs     02/22/25  0511 02/22/25  1313 02/22/25  1537 02/22/25  2232   * 130* 133* 134*   K 3.9  --   --   --    CL 95*  --   --   --    CO2 26  --   --   --    BUN 36*  --   --   --    CREATININE 0.75  --   --   --    CALCIUM 8.8  --   --   --        Assessment / Plan:  Principal Problem:    Hyponatremia  Active Problems:    H/O carotid endarterectomy    Primary hypertension    Type 2 diabetes mellitus with peripheral vascular disease (HCC)    Hypothyroidism    Chronic renal disease, stage III (HCC) [555863]    SIADH (syndrome of inappropriate ADH production)  Resolved Problems:    * No resolved hospital problems. *    1) Hyponatremia  - Diagnosed with SIADH in 1/25, Zoloft stopped.  Treated with Urea and fluid restriction  - Her serum Na actually normalized at 137 on 1/30/25.  - However, Na worse again despite report of compliance with 
4 Eyes Skin Assessment     NAME:  Violet Siddiqui  YOB: 1940  MEDICAL RECORD NUMBER:  [id number]    The patient is being assessed for  Admission    I agree that at least one RN has performed a thorough Head to Toe Skin Assessment on the patient. ALL assessment sites listed below have been assessed.      Areas assessed by both nurses:    Head, Face, Ears, Shoulders, Back, Chest, Arms, Elbows, Hands, Sacrum. Buttock, Coccyx, Ischium, and Legs. Feet and Heels        Does the Patient have a Wound? Yes wound(s) were present on assessment. LDA wound assessment was Initiated and completed by RN       Boom Prevention initiated by RN: Yes  Wound Care Orders initiated by RN: No    Pressure Injury (Stage 3,4, Unstageable, DTI, NWPT, and Complex wounds) if present, place Wound referral order by RN under : Yes    New Ostomies, if present place, Ostomy referral order under : No     Nurse 1 eSignature: Electronically signed by Melina Trinh RN on 2/20/25 at 12:17 AM EST    **SHARE this note so that the co-signing nurse can place an eSignature**    Nurse 2 eSignature: {Esignature:[id number]}    
ACUTE OCCUPATIONAL THERAPY GOALS:   (Developed with and agreed upon by patient and/or caregiver.)  3. Patient will complete grooming ADL standing at sink with INDEPENDENCE.  5. Patient will complete functional transfers with MODIFIED INDEPENDENCE and adaptive equipment as needed.   6. Patient will ambulate household distances with MODIFIED INDEPENDENCE and adaptive equipment as needed.     Timeframe: 7 visits     ALL GOALS MET 2/21/25    OCCUPATIONAL THERAPY Initial Assessment, Daily Note, and Discharge       OT Visit Days: 1  Acknowledge Orders  Time  OT Charge Capture  Rehab Caseload Tracker      Violet Siddiqui is a 85 y.o. female   PRIMARY DIAGNOSIS: Hyponatremia  Hyponatremia [E87.1]       Reason for Referral: Generalized Muscle Weakness (M62.81)  Difficulty in walking, Not elsewhere classified (R26.2)  Other abnormalities of gait and mobility (R26.89)  Inpatient: Payor: MEDICARE / Plan: MEDICARE PART A AND B / Product Type: *No Product type* /     ASSESSMENT:     REHAB RECOMMENDATIONS:   Recommendation to date pending progress:  Setting:  No further skilled occupational therapy after discharge from hospital    Equipment:    None--pt has rolling walker and SPC at home     ASSESSMENT:  Ms. Siddiqui is an 86 y/o female presents with abnormal labs, found to  be hyponatremic. At baseline pt lives with her spouse, is independent all ADLs and uses SPC for ambulation. Today pt overall modified independent with RW for functional transfers and mobility of household distances in hallway. Pt able to complete grooming ADLs standing at sink, good dynamic balance while also talking to her sister in law in the room. Pt noted to be safer with rolling walker, encouraged to use when she gets home. Pt has been up ad tanesha in the room for other ADLs, appears to be functioning at her baseline, does not need further OT services during acute care stay or at d/c. Will d/c from caseload.      [facility] AM-PAC™ “6 Clicks” 
ACUTE PHYSICAL THERAPY GOALS:   (Developed with and agreed upon by patient and/or caregiver.)  LTG:  (1.)Ms. Siddiqui will move from supine to sit and sit to supine , scoot up and down, and roll side to side in bed with INDEPENDENCE within 7 treatment day(s).    (2.)Ms. Siddiqui will transfer from bed to chair and chair to bed with MODIFIED INDEPENDENCE using the least restrictive device within 7 treatment day(s).    (3.)Ms. Siddiqui will ambulate with MODIFIED INDEPENDENCE for 500 feet with the least restrictive device within 7 treatment day(s).  (4.)Ms. Siddiqui will perform standing static and dynamic balance activities x 15 minutes with MODIFIED INDEPENDENCE to improve safety within 7 day(s).        ________________________________________________________________________________________________        PHYSICAL THERAPY Initial Assessment and AM  (Link to Caseload Tracking: PT Visit Days : 1  Acknowledge Orders  Time In/Out  PT Charge Capture  Rehab Caseload Tracker    Violet Siddiqui is a 85 y.o. female   PRIMARY DIAGNOSIS: Hyponatremia  Hyponatremia [E87.1]       Reason for Referral: Generalized Muscle Weakness (M62.81)  Inpatient: Payor: MEDICARE / Plan: MEDICARE PART A AND B / Product Type: *No Product type* /     ASSESSMENT:     REHAB RECOMMENDATIONS:   Recommendation to date pending progress:  Setting:  Home Health Therapy    Equipment:    To Be Determined     ASSESSMENT:  Ms. Siddiqui was admitted to the hospital with abnormal labs.  She was found hyponatremic and has a history of type 2 diabetes.  Pt presents to PT with WFL AROM and decreased strength in B LEs.  Pt was able to come to sitting on EOB today with supervision and good sitting balance.  Pt was able to stand today with SBA/RW and good-fair standing balance.  Pt ambulated today in corona with SBA-CGA/RW and decreased joselin.  Pt returned to room today with SBA and participated in standing activities with supervision.  She demonstrated overall 
ACUTE PHYSICAL THERAPY GOALS:   (Developed with and agreed upon by patient and/or caregiver.)  LTG:  (1.)Ms. Siddiqui will move from supine to sit and sit to supine , scoot up and down, and roll side to side in bed with INDEPENDENCE within 7 treatment day(s).    (2.)Ms. Siddiqui will transfer from bed to chair and chair to bed with MODIFIED INDEPENDENCE using the least restrictive device within 7 treatment day(s).    (3.)Ms. Siddiqui will ambulate with MODIFIED INDEPENDENCE for 500 feet with the least restrictive device within 7 treatment day(s).  (4.)Ms. Siddiqui will perform standing static and dynamic balance activities x 15 minutes with MODIFIED INDEPENDENCE to improve safety within 7 day(s).       PHYSICAL THERAPY: Daily Note AM   (Link to Caseload Tracking: PT Visit Days : 2  Time In/Out PT Charge Capture  Rehab Caseload Tracker  Orders    Violet Siddiqui is a 85 y.o. female   PRIMARY DIAGNOSIS: Hyponatremia  Hyponatremia [E87.1]       Inpatient: Payor: MEDICARE / Plan: MEDICARE PART A AND B / Product Type: *No Product type* /     ASSESSMENT:     REHAB RECOMMENDATIONS:   Recommendation to date pending progress:  Setting:  Home Health Therapy    Equipment:    To Be Determined     ASSESSMENT:  Ms. Siddiqui was supine in bed and agreeable to therapy.  Pt came to sitting on EOB with supervision and good sitting balance.  Pt stood today with supervision/RW and good-fair standing balance.  Pt ambulated in corona today with supervision/RW and decreased joselin.  She took several standing rest breaks today as well as a seated break in between bouts of walking.  Pt also transferred to/from toilet with supervision before transferring to chair.  Pt progressed in ambulation today.     SUBJECTIVE:   Ms. Siddiqui states, \"Thank you\"     Social/Functional Lives With: Spouse  Type of Home: House  Home Layout: One level  Home Access: Stairs to enter without rails  Entrance Stairs - Number of Steps: 1  Bathroom Shower/Tub: 
Nutrition Assessment  Assessment Type: Initial, Positive nutrition screen  Reason for visit:  Best Practice Alert: Malnutrition Screening Tool   Malnutrition Screening Tool Score: 2    Nutrition Intervention:   Food and/or Nutrient Delivery:   Meals and Snacks:  Diet: Continue current order  Medical Food Supplements:   Medical food supplement therapy:   Defer   due to strict 1L fluid restriction with SIADH     Malnutrition Assessment:  Academy/A.S.P.E.N Clinical Malnutrition Criteria  Malnutrition Status: At risk for malnutrition (advanced age, reports of decreased appetite)    Nutrition Focused Physical Exam: Unremarkable     Nutrition Assessment:  Food/Nutrition Related History: Pt notes that her appetite has been poor at home for a while but that she \"forces\" herself to eat. Typically eats 2 meals per day. Either Breakfast and Dinner or Lunch and Dinner. Reports she normally goes out to eat at lunch time. Notes she is very intentional with her fluid restriction at home.      Do You Have Any Cultural, Sikhism, or Ethnic Food Preferences?: No   Weight History: 2/22/24: 158# (IM OV), 8/29/24: 154# (IM OV), 1/30/25: 151# (IM OV)   CBW: 157# (2/20/25- bed scale)   Steady trend down in weight over the past year per IM OV. CBW may be skewed by bed scale measure.   Nutrition Background:       PMH: recently dx SIADH, HTN, hypothyroidism, CKD3. Presents to ED with significant hyponatremia.   Nutrition Monitoring/Evaluation:  Visited with patient in room. Reports that her appetite is limited at this time, eating 50% of meals on average. RD observes about 50% of lunch tray consumed. Pt notes she could not eat more at this time as she spilled her tea on her plate. Pt denies N/V/C/D at this time. Reviewed fluid restriction with patient. ONS not appropriate at this time. Encouraged intake of at least 50% or more of meals, focusing on protein and carb intake.     Current Nutrition Therapies:  ADULT DIET; Regular; 1000 
Patient discharged off the floor via wheelchair with all belongings and was assisted into vehicle with no complications.   
TRANSFER - IN REPORT:    Verbal report received from KAYLA Kearney on Violet Sdidiqui  being received from ED for routine progression of patient care      Report consisted of patient's Situation, Background, Assessment and   Recommendations(SBAR).     Information from the following report(s) Nurse Handoff Report, ED Encounter Summary, ED SBAR, and Adult Overview was reviewed with the receiving nurse.    Opportunity for questions and clarification was provided.      Assessment completed upon patient's arrival to unit and care assumed.     
Ultrasound was used to find the vein which was compressible and without any ultrasound features of an artery or nerve bundle. A single lumen Midline was placed to the left cephalic (refer to IV assessment flowsheet for further documentation). No immediate complications noted. Patient tolerated well.     Line is okay to use: yes      
Hyponatremia  - Diagnosed with SIADH in 1/25, Zoloft stopped.  Treated with Urea and fluid restriction  - Her serum Na actually normalized at 137 on 1/30/25.  - However, Na worse again despite report of compliance with treatment plan  - Repeat urine study still appears consistent with SIADH.  Urine osmo higher.   - This time getting salt tab, Urea and fluid restriction  - Suspect Tolvaptan will be too expensive for her  - Could also consider demeclocycline although efficacy likely variable but will try today as not much progress made so far on hyponatremia  
4.77 4.05 - 5.2 M/uL    Hemoglobin 14.5 11.7 - 15.4 g/dL    Hematocrit 40.5 35.8 - 46.3 %    MCV 84.9 82 - 102 FL    MCH 30.4 26.1 - 32.9 PG    MCHC 35.8 (H) 31.4 - 35.0 g/dL    RDW 12.1 11.9 - 14.6 %    Platelets 345 150 - 450 K/uL    MPV 10.1 9.4 - 12.3 FL    nRBC 0.00 0.0 - 0.2 K/uL    Differential Type AUTOMATED      Neutrophils % 62.6 43.0 - 78.0 %    Lymphocytes % 22.7 13.0 - 44.0 %    Monocytes % 13.1 (H) 4.0 - 12.0 %    Eosinophils % 0.6 0.5 - 7.8 %    Basophils % 0.2 0.0 - 2.0 %    Immature Granulocytes % 0.8 0.0 - 5.0 %    Neutrophils Absolute 7.69 1.70 - 8.20 K/UL    Lymphocytes Absolute 2.80 0.50 - 4.60 K/UL    Monocytes Absolute 1.61 (H) 0.10 - 1.30 K/UL    Eosinophils Absolute 0.08 0.00 - 0.80 K/UL    Basophils Absolute 0.03 0.00 - 0.20 K/UL    Immature Granulocytes Absolute 0.10 0.0 - 0.5 K/UL   Sodium    Collection Time: 02/20/25 10:23 AM   Result Value Ref Range    Sodium 122 (L) 136 - 145 mmol/L   Osmolality    Collection Time: 02/20/25  1:24 PM   Result Value Ref Range    Serum Osmolality 278 (L) 280 - 301 MOSM/kg H2O       No results for input(s): \"COVID19\" in the last 72 hours.    Current Meds:  Current Facility-Administered Medications   Medication Dose Route Frequency    sodium chloride tablet 1 g  1 g Oral BID WC    aspirin EC tablet 81 mg  81 mg Oral BID    oyster shell calcium w/D 500-5 MG-MCG tablet 1 tablet  1 tablet Oral Lunch    meclizine (ANTIVERT) tablet 25 mg  25 mg Oral TID PRN    atorvastatin (LIPITOR) tablet 10 mg  10 mg Oral QPM    levothyroxine (SYNTHROID) tablet 88 mcg  88 mcg Oral Daily    lisinopril (PRINIVIL;ZESTRIL) tablet 20 mg  20 mg Oral BID    urea (URE-NA) packet 15 g  15 g Oral BID    traMADol (ULTRAM) tablet 50 mg  50 mg Oral Q4H PRN    sodium chloride flush 0.9 % injection 5-40 mL  5-40 mL IntraVENous 2 times per day    sodium chloride flush 0.9 % injection 5-40 mL  5-40 mL IntraVENous PRN    0.9 % sodium chloride infusion   IntraVENous PRN    potassium chloride

## 2025-02-23 NOTE — DISCHARGE INSTRUCTIONS
DISCHARGE SUMMARY from Nurse    PATIENT INSTRUCTIONS:    What to do at Home:  Recommended activity: activity as tolerated    If you experience a return of any symptoms, please follow up with PCP or Emergency Department.    *  Please give a list of your current medications to your Primary Care Provider.    *  Please update this list whenever your medications are discontinued, doses are      changed, or new medications (including over-the-counter products) are added.    *  Please carry medication information at all times in case of emergency situations.    These are general instructions for a healthy lifestyle:    No smoking/ No tobacco products/ Avoid exposure to second hand smoke  Surgeon General's Warning:  Quitting smoking now greatly reduces serious risk to your health.    Obesity, smoking, and sedentary lifestyle greatly increases your risk for illness    A healthy diet, regular physical exercise & weight monitoring are important for maintaining a healthy lifestyle    You may be retaining fluid if you have a history of heart failure or if you experience any of the following symptoms:  Weight gain of 3 pounds or more overnight or 5 pounds in a week, increased swelling in our hands or feet or shortness of breath while lying flat in bed.  Please call your doctor as soon as you notice any of these symptoms; do not wait until your next office visit.        The discharge information has been reviewed with the patient.

## 2025-02-23 NOTE — PLAN OF CARE
Problem: Chronic Conditions and Co-morbidities  Goal: Patient's chronic conditions and co-morbidity symptoms are monitored and maintained or improved  Outcome: Progressing     Problem: Discharge Planning  Goal: Discharge to home or other facility with appropriate resources  Outcome: Progressing     Problem: Pain  Goal: Verbalizes/displays adequate comfort level or baseline comfort level  Outcome: Progressing     Problem: Skin/Tissue Integrity  Goal: Skin integrity remains intact  Description: 1.  Monitor for areas of redness and/or skin breakdown  2.  Assess vascular access sites hourly  3.  Every 4-6 hours minimum:  Change oxygen saturation probe site  4.  Every 4-6 hours:  If on nasal continuous positive airway pressure, respiratory therapy assess nares and determine need for appliance change or resting period  Outcome: Progressing     Problem: Safety - Adult  Goal: Free from fall injury  Outcome: Progressing     Problem: ABCDS Injury Assessment  Goal: Absence of physical injury  Outcome: Progressing     Problem: Neurosensory - Adult  Goal: Achieves stable or improved neurological status  Outcome: Progressing     Problem: Respiratory - Adult  Goal: Achieves optimal ventilation and oxygenation  Outcome: Progressing     Problem: Skin/Tissue Integrity - Adult  Goal: Skin integrity remains intact  Description: 1.  Monitor for areas of redness and/or skin breakdown  2.  Assess vascular access sites hourly  3.  Every 4-6 hours minimum:  Change oxygen saturation probe site  4.  Every 4-6 hours:  If on nasal continuous positive airway pressure, respiratory therapy assess nares and determine need for appliance change or resting period  Outcome: Progressing  Goal: Incisions, wounds, or drain sites healing without S/S of infection  Outcome: Progressing     Problem: Metabolic/Fluid and Electrolytes - Adult  Goal: Electrolytes maintained within normal limits  Outcome: Progressing  Goal: Hemodynamic stability and optimal

## 2025-02-23 NOTE — CARE COORDINATION
MSN, Cm:  patient to be discharged home today with [facility] and [facility].  Patient and family agree with this discharge plan.  Patient has met all milestones for this admission.  Family to transport patient home.       02/23/25 0850   Service Assessment   Patient Orientation Alert and Oriented   Cognition Alert   Services At/After Discharge   Transition of Care Consult (CM Consult) Home Health;Other  ([facility].)   Internal Home Health No   Reason Outside Agency Chosen Script used patient chose alternate agency   Services At/After Discharge PT;OT;Nursing services   Mode of Transport at Discharge Other (see comment)  (family to transport)   Confirm Follow Up Transport Family   Condition of Participation: Discharge Planning   The Plan for Transition of Care is related to the following treatment goals: Home Health and Palliative Care   The Patient and/or Patient Representative was provided with a Choice of Provider? Patient   The Patient and/Or Patient Representative agree with the Discharge Plan? Yes   Freedom of Choice list was provided with basic dialogue that supports the patient's individualized plan of care/goals, treatment preferences, and shares the quality data associated with the providers?  Yes

## 2025-02-24 ENCOUNTER — TELEPHONE (OUTPATIENT)
Dept: INTERNAL MEDICINE CLINIC | Facility: CLINIC | Age: 85
End: 2025-02-24

## 2025-02-24 ENCOUNTER — CARE COORDINATION (OUTPATIENT)
Dept: CARE COORDINATION | Facility: CLINIC | Age: 85
End: 2025-02-24

## 2025-02-24 DIAGNOSIS — E87.1 HYPONATREMIA: Primary | ICD-10-CM

## 2025-02-24 NOTE — CARE COORDINATION
Care Transitions Note    Initial Call - Call within 2 business days of discharge: Yes    Patient Current Location:  Home: [address] [place] [address]  [place] [address]    Care Transition Nurse contacted the patient, spouse/partner  by telephone to perform post hospital discharge assessment, verified name and  as identifiers. Provided introduction to self, and explanation of the Care Transition Nurse role.     Patient: Violet Siddiqui Patient : 1940   MRN: [id number]    Reason for Admission: Hyponatremia   Discharge Date: 25  RURS: Readmission Risk Score: 13.8    Last Discharge Facility       Date Complaint Diagnosis Description Type Department Provider    25 Abnormal Lab Hyponatremia ... ED to Hosp-Admission (Discharged) (ADMITTED) [id number] Akira Resendiz MD; MARY Manzanares.            Was this an external facility discharge? No    Additional needs identified to be addressed with provider   No needs identified             Method of communication with provider: none.    Patients top risk factors for readmission: medical condition-hyponatremia, SIADH, HTN, DM, CRD     Interventions to address risk factors:   Spouse reports patient is doing well since discharge.   Reviewed discharge instructions and offered opportunity to ask any questions regarding discharge instructions.  Confirmed medications obtained and taking as ordered  Discussed the importance of medication adherence and management.   CTN encouraged self-monitoring and when to seek care  Spouse is aware of upcoming appointments. Patient was discharged with [facility] and soc is scheduled for today per spouse.   Care Transition Nurse reviewed discharge instructions, medical action plan, and red flags with patient and spouse/partner. The patient and spouse/partner was given an opportunity to ask questions; all questions answered at this time.. The patient and spouse/partner verbalized understanding.   Were discharge instructions available

## 2025-02-24 NOTE — TELEPHONE ENCOUNTER
Care Transitions Initial Follow Up Call    Outreach made within 2 business days of discharge: Yes    Patient: Violet Siddiqui Patient : 1940   MRN: [id number]  Reason for Admission: Hyponatremia  Discharge Date: 25       Spoke with: Patient's     Discharge department/facility: [facility]    TCM Interactive Patient Contact:  Was patient able to fill all prescriptions: Yes  Was patient instructed to bring all medications to the follow-up visit: Yes  Is patient taking all medications as directed in the discharge summary? Yes  Does patient understand their discharge instructions: Yes  Does patient have questions or concerns that need addressed prior to 7-14 day follow up office visit: no    Additional needs identified to be addressed with provider  No needs identified             Scheduled appointment with PCP within 7-14 days    Follow Up  Future Appointments   Date Time Provider Department Center   2025  3:00 PM [facility]   3/12/2025  9:30 AM CHAIR 1 [facility] AM[facility]   2025 10:00 AM [facility] Medrano [facility]       Shelbi Barksdale

## 2025-02-27 ENCOUNTER — OFFICE VISIT (OUTPATIENT)
Dept: INTERNAL MEDICINE CLINIC | Facility: CLINIC | Age: 85
End: 2025-02-27

## 2025-02-27 ENCOUNTER — TELEPHONE (OUTPATIENT)
Dept: INTERNAL MEDICINE CLINIC | Facility: CLINIC | Age: 85
End: 2025-02-27

## 2025-02-27 VITALS
SYSTOLIC BLOOD PRESSURE: 128 MMHG | OXYGEN SATURATION: 95 % | BODY MASS INDEX: 28.51 KG/M2 | DIASTOLIC BLOOD PRESSURE: 64 MMHG | WEIGHT: 151 LBS | HEART RATE: 108 BPM | HEIGHT: 61 IN

## 2025-02-27 DIAGNOSIS — E87.1 HYPONATREMIA: ICD-10-CM

## 2025-02-27 DIAGNOSIS — R39.11 URINARY HESITANCY: ICD-10-CM

## 2025-02-27 DIAGNOSIS — E22.2 SIADH (SYNDROME OF INAPPROPRIATE ADH PRODUCTION): Primary | ICD-10-CM

## 2025-02-27 DIAGNOSIS — F11.99 OPIOID USE, UNSPECIFIED WITH UNSPECIFIED OPIOID-INDUCED DISORDER (HCC): ICD-10-CM

## 2025-02-27 LAB
ANION GAP SERPL CALC-SCNC: 13 MMOL/L (ref 7–16)
APPEARANCE UR: CLEAR
BACTERIA URNS QL MICRO: ABNORMAL /HPF
BILIRUB UR QL: NEGATIVE
BUN SERPL-MCNC: 39 MG/DL (ref 8–23)
CALCIUM SERPL-MCNC: 10.2 MG/DL (ref 8.8–10.2)
CHLORIDE SERPL-SCNC: 103 MMOL/L (ref 98–107)
CO2 SERPL-SCNC: 28 MMOL/L (ref 20–29)
COLOR UR: ABNORMAL
CREAT SERPL-MCNC: 0.99 MG/DL (ref 0.6–1.1)
CRYSTALS URNS QL MICRO: ABNORMAL /LPF
GLUCOSE SERPL-MCNC: 104 MG/DL (ref 70–99)
GLUCOSE UR STRIP.AUTO-MCNC: NEGATIVE MG/DL
HGB UR QL STRIP: NEGATIVE
KETONES UR QL STRIP.AUTO: NEGATIVE MG/DL
LEUKOCYTE ESTERASE UR QL STRIP.AUTO: ABNORMAL
NITRITE UR QL STRIP.AUTO: NEGATIVE
OTHER OBSERVATIONS: ABNORMAL
PH UR STRIP: 5.5 (ref 5–9)
POTASSIUM SERPL-SCNC: 4.1 MMOL/L (ref 3.5–5.1)
PROT UR STRIP-MCNC: NEGATIVE MG/DL
SODIUM SERPL-SCNC: 144 MMOL/L (ref 136–145)
SP GR UR REFRACTOMETRY: 1.02 (ref 1–1.02)
UROBILINOGEN UR QL STRIP.AUTO: 0.2 EU/DL (ref 0.2–1)
WBC URNS QL MICRO: ABNORMAL /HPF

## 2025-02-27 NOTE — PROGRESS NOTES
85-year-old female comes in for hospital follow-up.  Admitted on 2/19/2025 and discharged on 2/23/2025 from [facility] [place].  Patient was admitted because of hyponatremia.  Patient apparently recently diagnosed with SIADH.  Lab work done showed her sodium in the 120s.  It was thought that her SIADH was secondary to her Zoloft.  Nephrology was consulted.  Patient was put on fluid restrictions and started on salt tablets.  Sodium gradually improved to 134.  Goal sodium is 130s.  She is to come back with repeat sodium levels.  Discharge medications included sodium chloride 1 g tablet 1 tablet 3 times a day.  Patient continued on previous medications which included tramadol, urea, atorvastatin, Synthroid, lisinopril, multivitamins, aspirin, calcium carbonate with vitamin D, coenzyme Q, GlycoLax, meclizine.  Prolia injection..  She is here today for follow-up with her .  She states that her memory is not quite back to normal and she is having some problems with word searching.  She is monitoring her oral intake and her urinary output very closely and compliant with the salt tablets and the urea.    Abnormal Lab  This is a recurrent problem. The problem has been gradually improving. The treatment provided significant relief.           Past Medical History, Past Surgical History, Family history, Social History, and Medications were all reviewed with the patient today and updated as necessary.       Current Outpatient Medications   Medication Sig Dispense Refill    sodium chloride 1 g tablet Take 1 tablet by mouth 3 times daily (with meals) 90 tablet 3    urea (URE-NA) 15 g PACK packet Take 15 g by mouth in the morning and at bedtime 60 each 2    traMADol (ULTRAM) 50 MG tablet Take 1 tablet by mouth.      atorvastatin (LIPITOR) 10 MG tablet Take 1 tablet by mouth every evening 90 tablet 3    levothyroxine (SYNTHROID) 88 MCG tablet Take 1/2 tab on Sunday and one all other days Indications: a condition with low

## 2025-02-27 NOTE — TELEPHONE ENCOUNTER
Dr Lopez, if pt is going to continue this she will need a new order.   I think that you discussed this with her today at her office visit.

## 2025-02-27 NOTE — TELEPHONE ENCOUNTER
----- Message from JERO GREEN LPN sent at 2/27/2025  8:57 AM EST -----  Regarding: FW: ECC Message to Provider    ----- Message -----  From: Silvio Armstrong  Sent: 2/26/2025   2:20 PM EST  To: #  Subject: ECC Message to Provider                          ECC Message to Provider    Relationship to Patient: Covered Entity Sophie (palliative care nurse practioneer)     Additional Information Caller received a referral from the hospital that the patient needs to have Palliative care and she wants to confirm with the patient's provider if they agree to it.  --------------------------------------------------------------------------------------------------------------------------    Call Back Information: OK to leave message on voicemail  Preferred Call Back Number: Phone [phone]

## 2025-02-27 NOTE — TELEPHONE ENCOUNTER
Mary Ellen with Infusion center is needing a new script for pt's Prolia shot that is scheduled for the 12th. Mary Ellen also wants to know if the labs that pt got placed on the 23rd are going to be good for her appt or will she need more labs?? Mary Ellen stated that the labs have to be within a 2 week timeframe.

## 2025-02-28 ENCOUNTER — TELEPHONE (OUTPATIENT)
Dept: INTERNAL MEDICINE CLINIC | Facility: CLINIC | Age: 85
End: 2025-02-28

## 2025-02-28 DIAGNOSIS — R82.81 PYURIA: Primary | ICD-10-CM

## 2025-02-28 RX ORDER — TRAMADOL HYDROCHLORIDE 50 MG/1
50 TABLET ORAL EVERY 8 HOURS PRN
Qty: 60 TABLET | Refills: 3 | Status: SHIPPED | OUTPATIENT
Start: 2025-02-28 | End: 2025-03-30

## 2025-02-28 NOTE — TELEPHONE ENCOUNTER
----- Message from Dr. Shaneka Lopez MD sent at 2/28/2025 11:09 AM EST -----  Her serum sodium is on normal now.  Tell her she can cut her salt tablets back to 2 a day.  Lets recheck in about 1 week and lab orders in

## 2025-03-03 LAB
BACTERIA SPEC CULT: NORMAL
SERVICE CMNT-IMP: NORMAL

## 2025-03-04 ENCOUNTER — TELEPHONE (OUTPATIENT)
Dept: INTERNAL MEDICINE CLINIC | Facility: CLINIC | Age: 85
End: 2025-03-04

## 2025-03-04 ENCOUNTER — CARE COORDINATION (OUTPATIENT)
Dept: CARE COORDINATION | Facility: CLINIC | Age: 85
End: 2025-03-04

## 2025-03-04 NOTE — TELEPHONE ENCOUNTER
Pt spouse states that they were notified of this last Friday. She has dropped to 2 daily and will come to the lab Monday next week for the recheck.  He said that he will let her know.

## 2025-03-04 NOTE — CARE COORDINATION
Transitions Interventions  Other Interventions:          Follow Up Appointment:   Reviewed upcoming appointment(s).  Future Appointments         Provider Specialty Dept Phone    6/4/2025 10:00 AM Zuleika Medrano MD Internal Medicine [phone]        Care Transition Nurse provided contact information.   Plan to follow up  based on severity of symptoms and risk factors.  Plan for next call: Symptom management - assess for continued improvements and/or any new or worsening signs and symptoms to report and follow up.      Ching Hutchison RN

## 2025-03-04 NOTE — TELEPHONE ENCOUNTER
Apolonia from Speech Pathology is sending over an order for pt to have cognitive therapy for the need 8 weeks. Between 4 to 6 visit

## 2025-03-10 DIAGNOSIS — E22.2 SIADH (SYNDROME OF INAPPROPRIATE ADH PRODUCTION): ICD-10-CM

## 2025-03-10 DIAGNOSIS — E87.1 HYPONATREMIA: ICD-10-CM

## 2025-03-10 LAB
ANION GAP SERPL CALC-SCNC: 12 MMOL/L (ref 7–16)
BUN SERPL-MCNC: 31 MG/DL (ref 8–23)
CALCIUM SERPL-MCNC: 9.6 MG/DL (ref 8.8–10.2)
CHLORIDE SERPL-SCNC: 97 MMOL/L (ref 98–107)
CO2 SERPL-SCNC: 27 MMOL/L (ref 20–29)
CREAT SERPL-MCNC: 0.81 MG/DL (ref 0.6–1.1)
GLUCOSE SERPL-MCNC: 115 MG/DL (ref 70–99)
POTASSIUM SERPL-SCNC: 4 MMOL/L (ref 3.5–5.1)
SODIUM SERPL-SCNC: 136 MMOL/L (ref 136–145)

## 2025-03-17 DIAGNOSIS — E22.2 SIADH (SYNDROME OF INAPPROPRIATE ADH PRODUCTION): ICD-10-CM

## 2025-03-17 LAB
ANION GAP SERPL CALC-SCNC: 12 MMOL/L (ref 7–16)
BUN SERPL-MCNC: 31 MG/DL (ref 8–23)
CALCIUM SERPL-MCNC: 9.5 MG/DL (ref 8.8–10.2)
CHLORIDE SERPL-SCNC: 96 MMOL/L (ref 98–107)
CO2 SERPL-SCNC: 27 MMOL/L (ref 20–29)
CREAT SERPL-MCNC: 0.85 MG/DL (ref 0.6–1.1)
GLUCOSE SERPL-MCNC: 112 MG/DL (ref 70–99)
POTASSIUM SERPL-SCNC: 4.4 MMOL/L (ref 3.5–5.1)
SODIUM SERPL-SCNC: 135 MMOL/L (ref 136–145)

## 2025-03-18 DIAGNOSIS — E87.1 HYPONATREMIA: ICD-10-CM

## 2025-03-18 DIAGNOSIS — E22.2 SIADH (SYNDROME OF INAPPROPRIATE ADH PRODUCTION): Primary | ICD-10-CM

## 2025-03-20 DIAGNOSIS — E87.1 HYPONATREMIA: Primary | ICD-10-CM

## 2025-03-25 ENCOUNTER — CARE COORDINATION (OUTPATIENT)
Dept: CARE COORDINATION | Facility: CLINIC | Age: 85
End: 2025-03-25

## 2025-03-25 NOTE — CARE COORDINATION
Final Call     Patient Current Location:  Home: [facility] [place] [address]    Care Transition Nurse contacted the patient, spouse/partner  by telephone. Verified name and  as identifiers.    Patient graduated from the Care Transitions program on 3/25/25.  Patient/family verbalizes confidence in the ability to self-manage at this time. has the ability to self-manage at this time..      Advance Care Planning:   Does patient have an Advance Directive:  Has ACP docs .    Handoff:   Patient was not referred to the ACM team due to patient declined services.    Patient is current with [facility]. Patient has good support from her spouse.    Assessments:  Care Transitions Subsequent and Final Call    Subsequent and Final Calls  Are you currently active with any services?: Home Health  Care Transitions Interventions  Other Interventions:          Upcoming Appointments:    Future Appointments         Provider Specialty Dept Phone    2025 10:00 AM Zuleika Medrano MD Internal Medicine [phone]        Patient has agreed to contact primary care provider and/or specialist for any further questions, concerns, or needs.    Ching Hutchison RN

## 2025-03-31 ENCOUNTER — TELEPHONE (OUTPATIENT)
Dept: INTERNAL MEDICINE CLINIC | Facility: CLINIC | Age: 85
End: 2025-03-31

## 2025-03-31 ENCOUNTER — HOSPITAL ENCOUNTER (OUTPATIENT)
Age: 85
Setting detail: OBSERVATION
Discharge: HOME | End: 2025-04-02
Attending: EMERGENCY MEDICINE | Admitting: EMERGENCY MEDICINE
Payer: MEDICARE

## 2025-03-31 ENCOUNTER — APPOINTMENT (OUTPATIENT)
Dept: CT IMAGING | Age: 85
End: 2025-03-31
Payer: MEDICARE

## 2025-03-31 DIAGNOSIS — M54.30 SCIATICA, UNSPECIFIED LATERALITY: Primary | ICD-10-CM

## 2025-03-31 DIAGNOSIS — J90 PLEURAL EFFUSION: ICD-10-CM

## 2025-03-31 DIAGNOSIS — J90 PLEURAL EFFUSION, LEFT: ICD-10-CM

## 2025-03-31 DIAGNOSIS — R91.8 LUNG MASS: ICD-10-CM

## 2025-03-31 PROBLEM — R09.02 HYPOXIA: Status: ACTIVE | Noted: 2025-03-31

## 2025-03-31 LAB
ALBUMIN SERPL-MCNC: 3.2 G/DL (ref 3.2–4.6)
ALBUMIN/GLOB SERPL: 0.8 (ref 1–1.9)
ALP SERPL-CCNC: 112 U/L (ref 35–104)
ALT SERPL-CCNC: 31 U/L (ref 8–45)
ANION GAP SERPL CALC-SCNC: 11 MMOL/L (ref 7–16)
APPEARANCE UR: CLEAR
AST SERPL-CCNC: 49 U/L (ref 15–37)
BASOPHILS # BLD: 0.05 K/UL (ref 0–0.2)
BASOPHILS NFR BLD: 0.4 % (ref 0–2)
BILIRUB SERPL-MCNC: 0.4 MG/DL (ref 0–1.2)
BILIRUB UR QL: NEGATIVE
BUN SERPL-MCNC: 32 MG/DL (ref 8–23)
CALCIUM SERPL-MCNC: 8.8 MG/DL (ref 8.8–10.2)
CHLORIDE SERPL-SCNC: 102 MMOL/L (ref 98–107)
CO2 SERPL-SCNC: 26 MMOL/L (ref 20–29)
COLOR UR: ABNORMAL
CREAT SERPL-MCNC: 0.67 MG/DL (ref 0.6–1.1)
DIFFERENTIAL METHOD BLD: ABNORMAL
EOSINOPHIL # BLD: 0.04 K/UL (ref 0–0.8)
EOSINOPHIL NFR BLD: 0.4 % (ref 0.5–7.8)
ERYTHROCYTE [DISTWIDTH] IN BLOOD BY AUTOMATED COUNT: 13.3 % (ref 11.9–14.6)
GLOBULIN SER CALC-MCNC: 3.9 G/DL (ref 2.3–3.5)
GLUCOSE SERPL-MCNC: 117 MG/DL (ref 70–99)
GLUCOSE UR STRIP.AUTO-MCNC: NEGATIVE MG/DL
HCT VFR BLD AUTO: 43.4 % (ref 35.8–46.3)
HGB BLD-MCNC: 15 G/DL (ref 11.7–15.4)
HGB UR QL STRIP: NEGATIVE
IMM GRANULOCYTES # BLD AUTO: 0.06 K/UL (ref 0–0.5)
IMM GRANULOCYTES NFR BLD AUTO: 0.5 % (ref 0–5)
KETONES UR QL STRIP.AUTO: 15 MG/DL
LEUKOCYTE ESTERASE UR QL STRIP.AUTO: NEGATIVE
LIPASE SERPL-CCNC: 42 U/L (ref 13–60)
LYMPHOCYTES # BLD: 1.99 K/UL (ref 0.5–4.6)
LYMPHOCYTES NFR BLD: 17.5 % (ref 13–44)
MAGNESIUM SERPL-MCNC: 2.2 MG/DL (ref 1.8–2.4)
MCH RBC QN AUTO: 30.7 PG (ref 26.1–32.9)
MCHC RBC AUTO-ENTMCNC: 34.6 G/DL (ref 31.4–35)
MCV RBC AUTO: 88.9 FL (ref 82–102)
MONOCYTES # BLD: 1 K/UL (ref 0.1–1.3)
MONOCYTES NFR BLD: 8.8 % (ref 4–12)
NEUTS SEG # BLD: 8.26 K/UL (ref 1.7–8.2)
NEUTS SEG NFR BLD: 72.4 % (ref 43–78)
NITRITE UR QL STRIP.AUTO: NEGATIVE
NRBC # BLD: 0 K/UL (ref 0–0.2)
PH UR STRIP: 6 (ref 5–9)
PLATELET # BLD AUTO: 246 K/UL (ref 150–450)
PMV BLD AUTO: 10.2 FL (ref 9.4–12.3)
POTASSIUM SERPL-SCNC: 4.2 MMOL/L (ref 3.5–5.1)
PROT SERPL-MCNC: 7.1 G/DL (ref 6.3–8.2)
PROT UR STRIP-MCNC: NEGATIVE MG/DL
RBC # BLD AUTO: 4.88 M/UL (ref 4.05–5.2)
SODIUM SERPL-SCNC: 138 MMOL/L (ref 136–145)
SP GR UR REFRACTOMETRY: 1.02 (ref 1–1.02)
UROBILINOGEN UR QL STRIP.AUTO: 0.2 EU/DL (ref 0.2–1)
WBC # BLD AUTO: 11.4 K/UL (ref 4.3–11.1)

## 2025-03-31 PROCEDURE — 6360000004 HC RX CONTRAST MEDICATION: Performed by: EMERGENCY MEDICINE

## 2025-03-31 PROCEDURE — 96374 THER/PROPH/DIAG INJ IV PUSH: CPT

## 2025-03-31 PROCEDURE — 85025 COMPLETE CBC W/AUTO DIFF WBC: CPT

## 2025-03-31 PROCEDURE — G0378 HOSPITAL OBSERVATION PER HR: HCPCS

## 2025-03-31 PROCEDURE — 6360000002 HC RX W HCPCS: Performed by: EMERGENCY MEDICINE

## 2025-03-31 PROCEDURE — 81003 URINALYSIS AUTO W/O SCOPE: CPT

## 2025-03-31 PROCEDURE — 71260 CT THORAX DX C+: CPT

## 2025-03-31 PROCEDURE — 6370000000 HC RX 637 (ALT 250 FOR IP): Performed by: STUDENT IN AN ORGANIZED HEALTH CARE EDUCATION/TRAINING PROGRAM

## 2025-03-31 PROCEDURE — 74177 CT ABD & PELVIS W/CONTRAST: CPT

## 2025-03-31 PROCEDURE — 83690 ASSAY OF LIPASE: CPT

## 2025-03-31 PROCEDURE — 96375 TX/PRO/DX INJ NEW DRUG ADDON: CPT

## 2025-03-31 PROCEDURE — 83735 ASSAY OF MAGNESIUM: CPT

## 2025-03-31 PROCEDURE — 93005 ELECTROCARDIOGRAM TRACING: CPT | Performed by: STUDENT IN AN ORGANIZED HEALTH CARE EDUCATION/TRAINING PROGRAM

## 2025-03-31 PROCEDURE — 99285 EMERGENCY DEPT VISIT HI MDM: CPT

## 2025-03-31 PROCEDURE — 80053 COMPREHEN METABOLIC PANEL: CPT

## 2025-03-31 RX ORDER — SODIUM CHLORIDE 1 G/1
1 TABLET ORAL
Status: DISCONTINUED | OUTPATIENT
Start: 2025-04-01 | End: 2025-04-02 | Stop reason: HOSPADM

## 2025-03-31 RX ORDER — TRAZODONE HYDROCHLORIDE 50 MG/1
50 TABLET ORAL NIGHTLY PRN
Status: DISCONTINUED | OUTPATIENT
Start: 2025-04-01 | End: 2025-04-02 | Stop reason: HOSPADM

## 2025-03-31 RX ORDER — ASPIRIN 81 MG/1
81 TABLET ORAL 2 TIMES DAILY
Status: DISCONTINUED | OUTPATIENT
Start: 2025-03-31 | End: 2025-04-02 | Stop reason: HOSPADM

## 2025-03-31 RX ORDER — ENOXAPARIN SODIUM 100 MG/ML
40 INJECTION SUBCUTANEOUS DAILY
Status: DISCONTINUED | OUTPATIENT
Start: 2025-04-01 | End: 2025-04-02 | Stop reason: HOSPADM

## 2025-03-31 RX ORDER — LORAZEPAM 0.5 MG/1
0.5 TABLET ORAL EVERY 6 HOURS PRN
Status: DISCONTINUED | OUTPATIENT
Start: 2025-03-31 | End: 2025-04-02 | Stop reason: HOSPADM

## 2025-03-31 RX ORDER — IOPAMIDOL 755 MG/ML
100 INJECTION, SOLUTION INTRAVASCULAR
Status: COMPLETED | OUTPATIENT
Start: 2025-03-31 | End: 2025-03-31

## 2025-03-31 RX ORDER — HYDROMORPHONE HYDROCHLORIDE 1 MG/ML
0.25 INJECTION, SOLUTION INTRAMUSCULAR; INTRAVENOUS; SUBCUTANEOUS EVERY 4 HOURS PRN
Refills: 0 | Status: DISCONTINUED | OUTPATIENT
Start: 2025-03-31 | End: 2025-04-02 | Stop reason: HOSPADM

## 2025-03-31 RX ORDER — POLYETHYLENE GLYCOL 3350 17 G/17G
17 POWDER, FOR SOLUTION ORAL DAILY PRN
Status: DISCONTINUED | OUTPATIENT
Start: 2025-03-31 | End: 2025-04-02 | Stop reason: HOSPADM

## 2025-03-31 RX ORDER — POTASSIUM CHLORIDE 1500 MG/1
40 TABLET, EXTENDED RELEASE ORAL PRN
Status: DISCONTINUED | OUTPATIENT
Start: 2025-03-31 | End: 2025-04-02 | Stop reason: HOSPADM

## 2025-03-31 RX ORDER — LEVOTHYROXINE SODIUM 88 UG/1
88 TABLET ORAL DAILY
Status: DISCONTINUED | OUTPATIENT
Start: 2025-04-01 | End: 2025-04-02 | Stop reason: HOSPADM

## 2025-03-31 RX ORDER — MAGNESIUM HYDROXIDE/ALUMINUM HYDROXICE/SIMETHICONE 120; 1200; 1200 MG/30ML; MG/30ML; MG/30ML
30 SUSPENSION ORAL EVERY 6 HOURS PRN
Status: DISCONTINUED | OUTPATIENT
Start: 2025-03-31 | End: 2025-04-02 | Stop reason: HOSPADM

## 2025-03-31 RX ORDER — MAGNESIUM SULFATE IN WATER 40 MG/ML
2000 INJECTION, SOLUTION INTRAVENOUS PRN
Status: DISCONTINUED | OUTPATIENT
Start: 2025-03-31 | End: 2025-04-02 | Stop reason: HOSPADM

## 2025-03-31 RX ORDER — ONDANSETRON 2 MG/ML
4 INJECTION INTRAMUSCULAR; INTRAVENOUS EVERY 6 HOURS PRN
Status: DISCONTINUED | OUTPATIENT
Start: 2025-03-31 | End: 2025-04-02 | Stop reason: HOSPADM

## 2025-03-31 RX ORDER — ONDANSETRON 4 MG/1
4 TABLET, ORALLY DISINTEGRATING ORAL EVERY 8 HOURS PRN
Status: DISCONTINUED | OUTPATIENT
Start: 2025-03-31 | End: 2025-04-02 | Stop reason: HOSPADM

## 2025-03-31 RX ORDER — TRAMADOL HYDROCHLORIDE 50 MG/1
50 TABLET ORAL 3 TIMES DAILY PRN
Status: DISCONTINUED | OUTPATIENT
Start: 2025-03-31 | End: 2025-04-02 | Stop reason: HOSPADM

## 2025-03-31 RX ORDER — ACETAMINOPHEN 325 MG/1
650 TABLET ORAL EVERY 6 HOURS PRN
Status: DISCONTINUED | OUTPATIENT
Start: 2025-03-31 | End: 2025-04-02 | Stop reason: HOSPADM

## 2025-03-31 RX ORDER — POTASSIUM CHLORIDE 7.45 MG/ML
10 INJECTION INTRAVENOUS PRN
Status: DISCONTINUED | OUTPATIENT
Start: 2025-03-31 | End: 2025-04-02 | Stop reason: HOSPADM

## 2025-03-31 RX ORDER — ATORVASTATIN CALCIUM 10 MG/1
10 TABLET, FILM COATED ORAL EVERY EVENING
Status: DISCONTINUED | OUTPATIENT
Start: 2025-03-31 | End: 2025-04-02 | Stop reason: HOSPADM

## 2025-03-31 RX ORDER — ONDANSETRON 2 MG/ML
4 INJECTION INTRAMUSCULAR; INTRAVENOUS ONCE
Status: COMPLETED | OUTPATIENT
Start: 2025-03-31 | End: 2025-03-31

## 2025-03-31 RX ORDER — OXYCODONE HYDROCHLORIDE 5 MG/1
5 TABLET ORAL EVERY 6 HOURS PRN
Refills: 0 | Status: DISCONTINUED | OUTPATIENT
Start: 2025-03-31 | End: 2025-03-31

## 2025-03-31 RX ORDER — SENNOSIDES A AND B 8.6 MG/1
2 TABLET, FILM COATED ORAL NIGHTLY PRN
Status: DISCONTINUED | OUTPATIENT
Start: 2025-04-01 | End: 2025-04-02 | Stop reason: HOSPADM

## 2025-03-31 RX ADMIN — LORAZEPAM 0.5 MG: 0.5 TABLET ORAL at 22:50

## 2025-03-31 RX ADMIN — IOPAMIDOL 100 ML: 755 INJECTION, SOLUTION INTRAVENOUS at 19:50

## 2025-03-31 RX ADMIN — ASPIRIN 81 MG: 81 TABLET ORAL at 22:50

## 2025-03-31 RX ADMIN — FENTANYL CITRATE 25 MCG: 50 INJECTION INTRAMUSCULAR; INTRAVENOUS at 18:49

## 2025-03-31 RX ADMIN — ATORVASTATIN CALCIUM 10 MG: 10 TABLET, FILM COATED ORAL at 22:50

## 2025-03-31 RX ADMIN — ONDANSETRON 4 MG: 2 INJECTION, SOLUTION INTRAMUSCULAR; INTRAVENOUS at 18:49

## 2025-03-31 ASSESSMENT — PAIN DESCRIPTION - LOCATION
LOCATION: ABDOMEN;GENERALIZED
LOCATION: ABDOMEN
LOCATION: ABDOMEN

## 2025-03-31 ASSESSMENT — PAIN SCALES - GENERAL
PAINLEVEL_OUTOF10: 8
PAINLEVEL_OUTOF10: 5
PAINLEVEL_OUTOF10: 2
PAINLEVEL_OUTOF10: 0

## 2025-03-31 ASSESSMENT — PAIN DESCRIPTION - PAIN TYPE: TYPE: CHRONIC PAIN

## 2025-03-31 ASSESSMENT — PAIN - FUNCTIONAL ASSESSMENT
PAIN_FUNCTIONAL_ASSESSMENT: PREVENTS OR INTERFERES SOME ACTIVE ACTIVITIES AND ADLS
PAIN_FUNCTIONAL_ASSESSMENT: PREVENTS OR INTERFERES SOME ACTIVE ACTIVITIES AND ADLS
PAIN_FUNCTIONAL_ASSESSMENT: 0-10

## 2025-03-31 ASSESSMENT — ENCOUNTER SYMPTOMS
NAUSEA: 1
SHORTNESS OF BREATH: 1
VOMITING: 0
DIARRHEA: 0
ABDOMINAL PAIN: 1
COUGH: 0
RHINORRHEA: 0

## 2025-03-31 NOTE — FLOWSHEET NOTE
Patient arrived via EMS form home with complaints of fatigue and abdominal pain x 6-9 months. She has progressively gotten worse this past week and has been SOB when ambulating this past week.

## 2025-03-31 NOTE — ED PROVIDER NOTES
Emergency Department Provider Note       PCP: Zuleika Medrano MD   Age: 85 y.o.   Sex: female     DISPOSITION                  ICD-10-CM    1. Pleural effusion, left  J90       2. Lung mass  R91.8           Medical Decision Making     She is tachycardic with an irregular rhythm so I will get an EKG.  Given her abdominal pain I will check her basic blood work and get a CT scan of her abdomen.  I will try to treat her symptoms with a small dose of fentanyl and some Zofran.      ED Course as of 03/31/25 2050   Mon Mar 31, 2025   1813 EKG and associated rhythm strip independent interpretation by ER doctor:  Sinus tachycardia with PAC  No acute ischemic ST segment changes  No QTC prolongation  Rate of: 109 [AC]      ED Course User Index  [AC] Catracho Almanza MD     1 or more acute illnesses that pose a threat to life or bodily function.   Shared medical decision making was utilized in creating the patients health plan today.    I independently ordered and reviewed each unique test.       I interpreted the CT Scan large left pleural effusion with collapse of the left lower lobe and pulmonary mass.    The patient was admitted and I have discussed patient management with the admitting provider.          History     85-year-old lady presents with concerns about abdominal pain primarily in her lower abdomen that does go into her epigastric region.  She says that with this she has had some discomfort but does not describe it as any sort of sharp pain.  She has had some nausea but no specific vomiting and no diarrhea    Elements of this note were created using speech recognition software.  As such, errors of speech recognition may be present.        ROS     Review of Systems   Constitutional:  Negative for chills and fever.   HENT:  Negative for congestion and rhinorrhea.    Respiratory:  Positive for shortness of breath. Negative for cough.    Gastrointestinal:  Positive for abdominal pain and nausea. Negative for  150 - 450 K/uL    MPV 10.2 9.4 - 12.3 FL    nRBC 0.00 0.0 - 0.2 K/uL    Differential Type AUTOMATED      Neutrophils % 72.4 43.0 - 78.0 %    Lymphocytes % 17.5 13.0 - 44.0 %    Monocytes % 8.8 4.0 - 12.0 %    Eosinophils % 0.4 (L) 0.5 - 7.8 %    Basophils % 0.4 0.0 - 2.0 %    Immature Granulocytes % 0.5 0.0 - 5.0 %    Neutrophils Absolute 8.26 (H) 1.70 - 8.20 K/UL    Lymphocytes Absolute 1.99 0.50 - 4.60 K/UL    Monocytes Absolute 1.00 0.10 - 1.30 K/UL    Eosinophils Absolute 0.04 0.00 - 0.80 K/UL    Basophils Absolute 0.05 0.00 - 0.20 K/UL    Immature Granulocytes Absolute 0.06 0.0 - 0.5 K/UL   Comprehensive Metabolic Panel   Result Value Ref Range    Sodium 138 136 - 145 mmol/L    Potassium 4.2 3.5 - 5.1 mmol/L    Chloride 102 98 - 107 mmol/L    CO2 26 20 - 29 mmol/L    Anion Gap 11 7 - 16 mmol/L    Glucose 117 (H) 70 - 99 mg/dL    BUN 32 (H) 8 - 23 MG/DL    Creatinine 0.67 0.60 - 1.10 MG/DL    Est, Glom Filt Rate 86 >60 ml/min/1.73m2    Calcium 8.8 8.8 - 10.2 MG/DL    Total Bilirubin 0.4 0.0 - 1.2 MG/DL    ALT 31 8 - 45 U/L    AST 49 (H) 15 - 37 U/L    Alk Phosphatase 112 (H) 35 - 104 U/L    Total Protein 7.1 6.3 - 8.2 g/dL    Albumin 3.2 3.2 - 4.6 g/dL    Globulin 3.9 (H) 2.3 - 3.5 g/dL    Albumin/Globulin Ratio 0.8 (L) 1.0 - 1.9     Lipase   Result Value Ref Range    Lipase 42 13 - 60 U/L   Magnesium   Result Value Ref Range    Magnesium 2.2 1.8 - 2.4 mg/dL   Urinalysis   Result Value Ref Range    Color, UA YELLOW/STRAW      Appearance CLEAR      Specific Gravity, UA 1.024 (H) 1.001 - 1.023      pH, Urine 6.0 5.0 - 9.0      Protein, UA Negative NEG mg/dL    Glucose, Ur Negative NEG mg/dL    Ketones, Urine 15 (A) NEG mg/dL    Bilirubin, Urine Negative NEG      Blood, Urine Negative NEG      Urobilinogen, Urine 0.2 0.2 - 1.0 EU/dL    Nitrite, Urine Negative NEG      Leukocyte Esterase, Urine Negative NEG           CT ABDOMEN PELVIS W IV CONTRAST Additional Contrast? None   Final Result   1. Long segment of wall  thickening involving the sigmoid colon with trace   pericolonic stranding which may relate to a colitis or acute diverticulitis. No   extraluminal air or abscess is seen. However, malignancy is not excluded and   direct visualization is recommended.   2. Small left pleural effusion with adjacent compressive   atelectasis/consolidation.   3. Additional findings as above.               Electronically signed by Vinay Butler      CT CHEST W CONTRAST   Final Result   1. Large bulky mediastinal and left hilar lymphadenopathy concerning for   malignancy/metastatic disease. There is occlusion of the left lower lobe   bronchus with compressive atelectasis/consolidation of the left lower lobe.   2. Moderate left and small right pleural effusions as above.   3. Trace pericardial effusion.      Electronically signed by Vinay Butler                   No results for input(s): \"COVID19\" in the last 72 hours.    Voice dictation software was used during the making of this note.  This software is not perfect and grammatical and other typographical errors may be present.  This note has not been completely proofread for errors.        Catracho Almanza MD  03/31/25 2051

## 2025-03-31 NOTE — TELEPHONE ENCOUNTER
I called and spoke with pt spouse and he said that her abd is tight about 2-2.5 inches below her navel and is not emptying her bladder enough. He said that she has discomfort  and nausea also and no energy. He said when she does urinate it is normal in color and odor. He also said that she is having trouble with constipation even with taking miralax and laxative daily. Denies fever or vomiting.   I spoke with Kym Alvarado and per her advise I advised pt spouse that if she is not emptying her bladder she needs to be taken to ER for evaluation. He agrees.

## 2025-03-31 NOTE — TELEPHONE ENCOUNTER
patients  is on the phone wants to speak to a nurse regarding his wife, he said it was about her stomach he said no to scheduling an appointment  12 mins  Tried taking info for TE, he declined  8 mins  He does not want to hold any longer, he is asking for you to call him back please  [phone]    PLEASE ADVISE.

## 2025-04-01 ENCOUNTER — TELEPHONE (OUTPATIENT)
Dept: PULMONOLOGY | Age: 85
End: 2025-04-01

## 2025-04-01 DIAGNOSIS — J90 PLEURAL EFFUSION: Primary | ICD-10-CM

## 2025-04-01 DIAGNOSIS — R59.0 MEDIASTINAL LYMPHADENOPATHY: ICD-10-CM

## 2025-04-01 DIAGNOSIS — J90 PLEURAL EFFUSION, LEFT: ICD-10-CM

## 2025-04-01 DIAGNOSIS — R91.8 LUNG MASS: ICD-10-CM

## 2025-04-01 LAB
AMYLASE FLD-CCNC: 19 U/L
APPEARANCE FLD: NORMAL
BODY FLD TYPE: NORMAL
COLOR FLD: NORMAL
EKG ATRIAL RATE: 111 BPM
EKG DIAGNOSIS: NORMAL
EKG P AXIS: 81 DEGREES
EKG P-R INTERVAL: 148 MS
EKG Q-T INTERVAL: 338 MS
EKG QRS DURATION: 81 MS
EKG QTC CALCULATION (BAZETT): 456 MS
EKG R AXIS: 80 DEGREES
EKG T AXIS: 75 DEGREES
EKG VENTRICULAR RATE: 109 BPM
GLUCOSE FLD-MCNC: 111 MG/DL
HCT VFR FLD CALC: 0.1 %
LDH FLD L TO P-CCNC: 97 U/L
LYMPHOCYTES NFR BRONCH MANUAL: 91 %
MACROPHAGES NFR BRONCH MANUAL: 8 %
NEUTROPHILS NFR BRONCH MANUAL: 1 %
NUC CELL # FLD: 6252 /CU MM
OTHER CELLS NFR BLD MANUAL: 16
PROT FLD-MCNC: 2.4 G/DL
RBC # FLD: NORMAL /CU MM
SPECIMEN SOURCE FLD: NORMAL

## 2025-04-01 PROCEDURE — 6370000000 HC RX 637 (ALT 250 FOR IP): Performed by: STUDENT IN AN ORGANIZED HEALTH CARE EDUCATION/TRAINING PROGRAM

## 2025-04-01 PROCEDURE — 88305 TISSUE EXAM BY PATHOLOGIST: CPT

## 2025-04-01 PROCEDURE — 88112 CYTOPATH CELL ENHANCE TECH: CPT

## 2025-04-01 PROCEDURE — 87205 SMEAR GRAM STAIN: CPT

## 2025-04-01 PROCEDURE — C1729 CATH, DRAINAGE: HCPCS | Performed by: INTERNAL MEDICINE

## 2025-04-01 PROCEDURE — 96372 THER/PROPH/DIAG INJ SC/IM: CPT

## 2025-04-01 PROCEDURE — 6360000002 HC RX W HCPCS: Performed by: STUDENT IN AN ORGANIZED HEALTH CARE EDUCATION/TRAINING PROGRAM

## 2025-04-01 PROCEDURE — 82150 ASSAY OF AMYLASE: CPT

## 2025-04-01 PROCEDURE — G0378 HOSPITAL OBSERVATION PER HR: HCPCS

## 2025-04-01 PROCEDURE — 2709999900 HC NON-CHARGEABLE SUPPLY: Performed by: INTERNAL MEDICINE

## 2025-04-01 PROCEDURE — 3609027000 HC THORACENTESIS W/ULTRASOUND: Performed by: INTERNAL MEDICINE

## 2025-04-01 PROCEDURE — 84157 ASSAY OF PROTEIN OTHER: CPT

## 2025-04-01 PROCEDURE — 99223 1ST HOSP IP/OBS HIGH 75: CPT | Performed by: INTERNAL MEDICINE

## 2025-04-01 PROCEDURE — 93010 ELECTROCARDIOGRAM REPORT: CPT | Performed by: INTERNAL MEDICINE

## 2025-04-01 PROCEDURE — 83615 LACTATE (LD) (LDH) ENZYME: CPT

## 2025-04-01 PROCEDURE — 76604 US EXAM CHEST: CPT | Performed by: INTERNAL MEDICINE

## 2025-04-01 PROCEDURE — 87070 CULTURE OTHR SPECIMN AEROBIC: CPT

## 2025-04-01 PROCEDURE — 96376 TX/PRO/DX INJ SAME DRUG ADON: CPT

## 2025-04-01 PROCEDURE — 82945 GLUCOSE OTHER FLUID: CPT

## 2025-04-01 PROCEDURE — 89050 BODY FLUID CELL COUNT: CPT

## 2025-04-01 PROCEDURE — 84311 SPECTROPHOTOMETRY: CPT

## 2025-04-01 PROCEDURE — 85013 SPUN MICROHEMATOCRIT: CPT

## 2025-04-01 PROCEDURE — 32555 ASPIRATE PLEURA W/ IMAGING: CPT | Performed by: INTERNAL MEDICINE

## 2025-04-01 RX ADMIN — ASPIRIN 81 MG: 81 TABLET ORAL at 20:23

## 2025-04-01 RX ADMIN — ACETAMINOPHEN 650 MG: 325 TABLET ORAL at 12:35

## 2025-04-01 RX ADMIN — ONDANSETRON 4 MG: 2 INJECTION, SOLUTION INTRAMUSCULAR; INTRAVENOUS at 11:45

## 2025-04-01 RX ADMIN — TRAMADOL HYDROCHLORIDE 50 MG: 50 TABLET, COATED ORAL at 11:26

## 2025-04-01 RX ADMIN — ATORVASTATIN CALCIUM 10 MG: 10 TABLET, FILM COATED ORAL at 17:50

## 2025-04-01 RX ADMIN — LORAZEPAM 0.5 MG: 0.5 TABLET ORAL at 04:16

## 2025-04-01 RX ADMIN — ASPIRIN 81 MG: 81 TABLET ORAL at 10:11

## 2025-04-01 RX ADMIN — Medication 1 G: at 17:50

## 2025-04-01 RX ADMIN — LEVOTHYROXINE SODIUM 88 MCG: 0.09 TABLET ORAL at 04:16

## 2025-04-01 RX ADMIN — ENOXAPARIN SODIUM 40 MG: 100 INJECTION SUBCUTANEOUS at 10:11

## 2025-04-01 ASSESSMENT — PAIN SCALES - GENERAL
PAINLEVEL_OUTOF10: 3
PAINLEVEL_OUTOF10: 0
PAINLEVEL_OUTOF10: 0
PAINLEVEL_OUTOF10: 8
PAINLEVEL_OUTOF10: 6

## 2025-04-01 ASSESSMENT — PAIN DESCRIPTION - ORIENTATION
ORIENTATION: RIGHT
ORIENTATION: LEFT

## 2025-04-01 ASSESSMENT — PAIN DESCRIPTION - DESCRIPTORS: DESCRIPTORS: ACHING

## 2025-04-01 ASSESSMENT — PAIN DESCRIPTION - LOCATION
LOCATION: HEAD
LOCATION: CHEST

## 2025-04-01 ASSESSMENT — PAIN - FUNCTIONAL ASSESSMENT: PAIN_FUNCTIONAL_ASSESSMENT: ACTIVITIES ARE NOT PREVENTED

## 2025-04-01 NOTE — PROGRESS NOTES
TRANSFER - IN REPORT:    Verbal report received from Joyce GARZA on Violet Siddiqui  being received from ED for routine progression of patient care      Report consisted of patient's Situation, Background, Assessment and   Recommendations(SBAR).     Information from the following report(s) ED Encounter Summary, MAR, and Recent Results was reviewed with the receiving nurse.    Opportunity for questions and clarification was provided.      Assessment completed upon patient's arrival to unit and care assumed.

## 2025-04-01 NOTE — ACP (ADVANCE CARE PLANNING)
Advance Care Planning Note   Admit Date:  3/31/2025  6:04 PM   Name:  Violet Siddiqui   Age:  85 y.o.  Sex:  female  :  1940   MRN:  [id number]   Room:      Violet Siddiqui has capacity to make her own decisions:   Yes    If pt unable to make decisions, POA/surrogate decision maker:  Daughter    Other people present:   Daughter & Son in law    Patient / surrogate decision-maker directed code status:  DNR/DNI    Other ACP topics discussed, if applicable:   None    Patient or surrogate consented to discussion of the current conditions, workup, management plans, prognosis, and the risk for further deterioration.  Time spent: 20 minutes in direct discussion.      Signed:  Jeffrey Su MD

## 2025-04-01 NOTE — TELEPHONE ENCOUNTER
Good morning!     Can we please get her scheduled for OP PET and EBUS. She should be discharged I think tomorrow.     Thanks!   Marcia   Impression:   Pt presenting with abd pain. Found to also have L>R pleural effusion, bulky mediastinal LAD and L hilar mass. All very concerning for primary lung cancer, small vs squamous cell.   Discussed this with her. She is interested in undergoing workup of this.   Will start with thoracentesis while admitted. Will need outpt PET scan.   If tap is non diagnostic will then need to be set up for EBUS if no other targets are found.      Wean O2 as sats allow. No desaturation documented and was on RA on admission.      Monitor for signs of infection  but agree with holding abx for now.       Pradeep [place] MD    I have spoken with Dr [place], per MD initiate PET scan and schedule EBUS with anesthesia if pleural fluid is not diagnostic.

## 2025-04-01 NOTE — H&P (VIEW-ONLY)
History and Physical Initial Visit NOTE           4/1/2025    Violet Siddiqui                        Date of Admission:  3/31/2025    The patient's chart is reviewed and the patient is discussed with the staff.    Subjective:     Patient is a 85 y.o.  female seen and evaluated at the request of Dr. Jeffrey Su for new pulmonary malignancy, SÁNCHEZ and pleural effusion. PMH includes left carotid artery stenosis, T2DM, HTN, CKD, hypothyroidism, SIADH, former tobacco abuse 60 total pack years- quit in 2014. Denies any known COPD, asthma or inhalers.    Presented to the ER after calling PCP and complaining of inability to void and abdominal pain. Once in ER she began complaining of severe shortness of breath and SÁNCHEZ over the last week. Notable ER labs CT A/P with long segment of wall thickening involving the sigmoid colon with trace pericolonic stranding, no abscess or extraluminal air seen. CT Chest with bilateral pleural effusions and bulky mediastinal lymphadenopathy and hilar lymphadenopathy. Denies any fevers, chills, cough or sick contacts. Denies any edema.     Review of Systems: Comprehensive ROS negative except in HPI    Current Outpatient Medications   Medication Instructions    aspirin 81 mg, Oral, 2 TIMES DAILY    atorvastatin (LIPITOR) 10 mg, Oral, EVERY EVENING    Calcium Carbonate-Vitamin D (CALCIUM-VITAMIN D) 600-125 MG-UNIT TABS 1 tablet, Oral, DAILY WITH LUNCH    coenzyme Q10 100 mg, Oral, DAILY WITH LUNCH    levothyroxine (SYNTHROID) 88 MCG tablet Take 1/2 tab on Sunday and one all other days Indications: a condition with low thyroid hormone levels    lisinopril (PRINIVIL;ZESTRIL) 20 mg, Oral, 2 TIMES DAILY    meclizine (ANTIVERT) 25 mg, Oral, 3 TIMES DAILY PRN    Multiple Vitamin (MULTI-VITAMINS PO) 1 tablet, Oral, DAILY    polyethylene glycol (GLYCOLAX) 17 g, Oral, DAILY PRN    sodium chloride 1 g, Oral, 3 TIMES DAILY WITH MEALS    urea (URE-NA) 15 g, Oral, 2 times daily     About Running Out of Food in the Last Year: Never true     Ran Out of Food in the Last Year: Never true   Transportation Needs: No Transportation Needs (3/31/2025)    PRAPARE - Transportation     Lack of Transportation (Medical): No     Lack of Transportation (Non-Medical): No   Physical Activity: Insufficiently Active (2024)    Exercise Vital Sign     Days of Exercise per Week: 2 days     Minutes of Exercise per Session: 20 min   Stress: Not on file   Social Connections: Unknown (3/20/2021)    Received from [employer]    Social Connections     Frequency of Communication with Friends and Family: Not asked     Frequency of Social Gatherings with Friends and Family: Not asked   Intimate Partner Violence: Unknown (3/20/2021)    Received from [employer]    Intimate Partner Violence     Fear of Current or Ex-Partner: Not asked     Emotionally Abused: Not asked     Physically Abused: Not asked     Sexually Abused: Not asked   Housing Stability: Low Risk  (3/31/2025)    Housing Stability Vital Sign     Unable to Pay for Housing in the Last Year: No     Number of Times Moved in the Last Year: 0     Homeless in the Last Year: No     Family History   Problem Relation Age of Onset    Heart Disease Brother         valve replaced    Thyroid Disease Paternal Grandmother         hypothyroidism    Cancer Mother         at age 90    Breast Cancer Mother 85    Heart Disease Mother     Lung Disease Sister     Heart Disease Sister     Other Sister         claudication    Stroke Father 64         at 65 after stroke    Diabetes Maternal Grandmother         controlled with diet     Allergies   Allergen Reactions    Lactose Other (See Comments) and Itching     Abdominal pain, irritable bowel    Nexletol [Bempedoic Acid] Diarrhea    Statins Myalgia and Other (See Comments)     Only allergic to pravastatin     Objective:   Blood pressure (!) 141/70, pulse (!) 106, temperature 97.7 °F (36.5 °C),  temperature source Axillary, resp. rate 17, height 1.524 m (5'), weight 72.6 kg (160 lb), SpO2 96%. No intake or output data in the 24 hours ending 04/01/25 0935  PHYSICAL EXAM   Constitutional:  the patient is well developed and in no acute distress  EENMT:  Sclera clear, pupils equal, oral mucosa moist  Respiratory: symmetric chest rise. Diminished posteriorly in L base on 2lpm  Cardiovascular:  RRR without M,G,R. There is no lower extremity edema.  Gastrointestinal: soft and non-tender; with positive bowel sounds.  Musculoskeletal: warm without cyanosis. Normal muscle tone.   Skin:  no jaundice or rashes, no wounds   Neurologic: symmetric strength, fluent speech  Psychiatric:  calm, appropriate, oriented x 4    Imaging: I performed an independent interpretation of the patient's images.  CT Chest: 3/31        Recent Labs     03/31/25  1844   WBC 11.4*   HGB 15.0   HCT 43.4         K 4.2      CO2 26   BUN 32*   CREATININE 0.67   MG 2.2   BILITOT 0.4   AST 49*   ALT 31   ALKPHOS 112*       Lab Results   Component Value Date/Time     03/31/2025 06:44 PM    K 4.2 03/31/2025 06:44 PM     03/31/2025 06:44 PM    CO2 26 03/31/2025 06:44 PM    BUN 32 03/31/2025 06:44 PM    CREATININE 0.67 03/31/2025 06:44 PM    GLUCOSE 117 03/31/2025 06:44 PM    CALCIUM 8.8 03/31/2025 06:44 PM      No results found for: \"BNP\"    ECHO: No results found for this or any previous visit.    MICRO: No results for input(s): \"CULTURE\" in the last 72 hours.  No results for input(s): \"COVID19\" in the last 72 hours.  Assessment and Plan:  (Medical Decision Making)   Impression: 86yo  female with PMH of left carotid artery stenosis, T2DM, HTN, CKD, hypothyroidism, former tobacco abuse 60 total pack years- quit in 2014. Presented to ER with abdominal pain and inability to void. During ER interview she also mentioned worsening SOB and SÁNCHEZ. CT Chest with lymphadenopathy and pleural effusions with concerns for

## 2025-04-01 NOTE — INTERVAL H&P NOTE
Update History & Physical    The patient's History and Physical of April 1,  was reviewed with the patient and I examined the patient. There was no change. The surgical site was confirmed by the patient and me.     Plan: The risks, benefits, expected outcome, and alternative to the recommended procedure have been discussed with the patient. Patient understands and wants to proceed with the procedure.     Electronically signed by Pradeep Kolb MD on 4/1/2025 at 11:21 AM

## 2025-04-01 NOTE — PROGRESS NOTES
Patient received Ativan twice this shift for anxiety; see MAR. Fine crackles auscultated to right posterior lobe. Patient ambulated with steady gait and standby assist to bathroom. Dyspnea on exertion resolved with rest. 2L NC remains in place. No distress noted.

## 2025-04-01 NOTE — PROGRESS NOTES
Pt is resting in bed without any complaints. She is currently on 2L NC with O2 >90%. Hourly rounds completed and all needs met. Bed is low, locked,call light is in reach and pt is encouraged to call for assistance.

## 2025-04-01 NOTE — H&P
Hospitalist History and Physical   Admit Date:  3/31/2025  6:04 PM   Name:  Violet Siddiqui   Age:  85 y.o.  Sex:  female  :  1940   MRN:  [id number]   Room:  [facility]/    Presenting Complaint: Abdominal Pain (6-9 months), Fatigue (6-9 months), and Shortness of Breath (X1 week)     Reason(s) for Admission: Lung mass [R91.8]  Hypoxia [R09.02]  Pleural effusion, left [J90]     History of Present Illness:   85-year-old female with PAD status post left carotid endarterectomy, SIA (recently discharged from [facility]  with increased salt tablets) presents with c/o abdominal tightness and discomfort.  She spoke on the phone to her PCP earlier today (3/31), complaining of abdominal tightness, nausea as well as inability to completely void.  She spoke with her PCP, who recommended coming to the emergency room due to failure to void.  Upon additional interview in the emergency room, she is complaining of severe shortness of breath and worsening dyspnea on exertion over the last week.    ED Course:  Heart rate 112, satting 95% on room air (the emergency room notes that O2 sats were borderline between 91 to 92%, though these are not yet recorded inside of epic).  /113… Though later in the ER, it is 99/44.  CMP unremarkable, CBC with a WBC of 11 otherwise normal, UA concentrated otherwise normal and a CT chest with contrast showed large bulky mediastinal and left hilar lymphadenopathy concerning for metastatic/malignancy as well as occlusion of the left lower lobe bronchus with compressive atelectasis and consolidation of the left lower lobe and a moderate left and small right pleural effusion and trace pericardial effusion.  A CT abdomen pelvis with contrast showed long segment of wall thickening involving the sigmoid colon that could possibly be colitis or acute diverticulitis (malignancy cannot be excluded).  The ER gave fentanyl and Zofran.    We did discuss her CODE STATUS  is flat.      Palpations: Abdomen is soft.   Musculoskeletal:         General: No swelling. Normal range of motion.      Cervical back: Normal range of motion and neck supple.   Skin:     General: Skin is warm and dry.   Neurological:      General: No focal deficit present.      Mental Status: She is alert and oriented to person, place, and time.   Psychiatric:         Mood and Affect: Mood normal.         Behavior: Behavior normal.         I have reviewed ordered lab tests and data below:    Last 24hr Labs:  Recent Results (from the past 24 hours)   CBC with Auto Differential    Collection Time: 03/31/25  6:44 PM   Result Value Ref Range    WBC 11.4 (H) 4.3 - 11.1 K/uL    RBC 4.88 4.05 - 5.2 M/uL    Hemoglobin 15.0 11.7 - 15.4 g/dL    Hematocrit 43.4 35.8 - 46.3 %    MCV 88.9 82 - 102 FL    MCH 30.7 26.1 - 32.9 PG    MCHC 34.6 31.4 - 35.0 g/dL    RDW 13.3 11.9 - 14.6 %    Platelets 246 150 - 450 K/uL    MPV 10.2 9.4 - 12.3 FL    nRBC 0.00 0.0 - 0.2 K/uL    Differential Type AUTOMATED      Neutrophils % 72.4 43.0 - 78.0 %    Lymphocytes % 17.5 13.0 - 44.0 %    Monocytes % 8.8 4.0 - 12.0 %    Eosinophils % 0.4 (L) 0.5 - 7.8 %    Basophils % 0.4 0.0 - 2.0 %    Immature Granulocytes % 0.5 0.0 - 5.0 %    Neutrophils Absolute 8.26 (H) 1.70 - 8.20 K/UL    Lymphocytes Absolute 1.99 0.50 - 4.60 K/UL    Monocytes Absolute 1.00 0.10 - 1.30 K/UL    Eosinophils Absolute 0.04 0.00 - 0.80 K/UL    Basophils Absolute 0.05 0.00 - 0.20 K/UL    Immature Granulocytes Absolute 0.06 0.0 - 0.5 K/UL   Comprehensive Metabolic Panel    Collection Time: 03/31/25  6:44 PM   Result Value Ref Range    Sodium 138 136 - 145 mmol/L    Potassium 4.2 3.5 - 5.1 mmol/L    Chloride 102 98 - 107 mmol/L    CO2 26 20 - 29 mmol/L    Anion Gap 11 7 - 16 mmol/L    Glucose 117 (H) 70 - 99 mg/dL    BUN 32 (H) 8 - 23 MG/DL    Creatinine 0.67 0.60 - 1.10 MG/DL    Est, Glom Filt Rate 86 >60 ml/min/1.73m2    Calcium 8.8 8.8 - 10.2 MG/DL    Total Bilirubin 0.4 0.0 -

## 2025-04-01 NOTE — OP NOTE
PROCEDURE: DIAGNOSTIC THORACENTESIS (CPT 29734)  and THERAPEUTIC THORACENTESIS (CPT  .       PRE-OP DIAGNOSIS:   LEFT  PLEURAL EFFUSION    POST-OP DIAGNOSIS:   LEFT  PLEURAL EFFUSION    VOLUME REMOVED:    300cc    ANESTHESIA:    LOCAL ANESTHESIA WITH 1% LIDOCAINE 10 CC TOTAL.      CHEST ULTRASOUND FINDINGS:    A Turbo-M, Sonosite ultrasound with a 5-16 mHz probe was used to image the chest and localize the pleural effusion on the left chest.    A moderate anechoic space was seen consistent with an uncomplicated pleural effusion.      DESCRIPTION OF PROCEDURE:    After obtaining informed consent and localizing the safest location for thoracentesis, the  8th intercostal space was marked with a blunt, plastic needle cap in the mid scapular line.    An [employer] AK-0100 Pleral-Seal thoracentesis kit was used to perform the procedure.    The skin was cleansed with the supplied chlorhexidine swab and then draped in the usual fashion.    Using the previously marked location as a guide, a 22 G 1.5 inch needle was used to inject 1% lidocaine into the skin and subcutaneous tissue, as well as onto the underlying rib and inter-costal muscles.  Pleural fluid was aspirated to assure proper location and additional lidocaine was injected into the pleural space prior to removing the anesthesia needle.    A 3mm incision was then made with the supplied scalpel in the usual fashion to facilitate the insertion of the thoracentesis needle.    The needle with an 8 Mohawk thoracentesis catheter was then introduced into the chest through the previously made incision in the usual fashion, the rib localized with the needle, and the catheter then marched over the rib into the pleural space.    After aspirating fluid, the thoracentesis catheter was then placed into the chest using the needle itself as a trocar.  The needle was then removed and the catheter was attached to the supplied tubing without complication.    300 cc of serosanguinous  fluid was aspirated and sent for analysis.    Fluid was sent for the following tests:    LDH  Total Protein  Glucose Cell count with differential  Routine culture and Gram stain   Cytology   AFB   Fungus  Cholesterol      Following drainage the catheter was removed, a bandage was applied and US confirmed complete drainage of the effusion and presence of lung sliding, ruling out pneumothorax. (10601)    EBL:     <5cc    COMPLICATIONS:    none      Pradeep Kolb MD

## 2025-04-01 NOTE — PROGRESS NOTES
Hospitalist Progress Note   Admit Date:  3/31/2025  6:04 PM   Name:  Violet Siddiqui   Age:  85 y.o.  Sex:  female  :  1940   MRN:  [id number]   Room:  [facility]/    Presenting/Chief Complaint: Abdominal Pain (6-9 months), Fatigue (6-9 months), and Shortness of Breath (X1 week)     Reason(s) for Admission: Lung mass [R91.8]  Hypoxia [R09.02]  Pleural effusion, left [J90]     Hospital Course:     Copied from admission history and physical HPI:  85-year-old female with PAD status post left carotid endarterectomy, SIA (recently discharged from [facility]  with increased salt tablets) presents with c/o abdominal tightness and discomfort.  She spoke on the phone to her PCP earlier today (3/31), complaining of abdominal tightness, nausea as well as inability to completely void.  She spoke with her PCP, who recommended coming to the emergency room due to failure to void.  Upon additional interview in the emergency room, she is complaining of severe shortness of breath and worsening dyspnea on exertion over the last week.     ED Course:  Heart rate 112, satting 95% on room air (the emergency room notes that O2 sats were borderline between 91 to 92%, though these are not yet recorded inside of epic).  /113… Though later in the ER, it is 99/44.  CMP unremarkable, CBC with a WBC of 11 otherwise normal, UA concentrated otherwise normal and a CT chest with contrast showed large bulky mediastinal and left hilar lymphadenopathy concerning for metastatic/malignancy as well as occlusion of the left lower lobe bronchus with compressive atelectasis and consolidation of the left lower lobe and a moderate left and small right pleural effusion and trace pericardial effusion.  A CT abdomen pelvis with contrast showed long segment of wall thickening involving the sigmoid colon that could possibly be colitis or acute diverticulitis (malignancy cannot be excluded).  The ER gave fentanyl and  128/70 97 %   03/31/25 2239 97.7 °F (36.5 °C) 65 22 (!) 145/72 96 %   03/31/25 2215 -- (!) 113 21 122/80 96 %   03/31/25 2201 -- (!) 112 20 125/88 96 %   03/31/25 2145 -- 100 20 93/80 99 %   03/31/25 2130 -- (!) 105 19 (!) 144/76 98 %   03/31/25 2115 -- (!) 109 22 (!) 139/110 94 %   03/31/25 2100 -- 97 17 (!) 143/79 92 %   03/31/25 2045 -- (!) 108 20 132/87 96 %   03/31/25 2030 -- 97 17 (!) 99/44 94 %   03/31/25 2015 -- (!) 107 15 (!) 149/94 95 %   03/31/25 1945 -- (!) 104 15 107/70 93 %   03/31/25 1930 -- (!) 106 17 (!) 127/97 93 %   03/31/25 1915 -- (!) 104 20 (!) 141/91 94 %   03/31/25 1900 -- 100 19 128/83 93 %   03/31/25 1849 -- (!) 114 15 129/71 95 %   03/31/25 1845 -- (!) 108 18 137/87 95 %   03/31/25 1843 -- (!) 101 23 137/87 95 %   03/31/25 1807 97.8 °F (36.6 °C) (!) 112 17 (!) 151/113 95 %       Oxygen Therapy  SpO2: 98 %  Pulse via Oximetry: 100 beats per minute  O2 Device: None (Room air)  O2 Flow Rate (L/min): 2 L/min    Estimated body mass index is 31.25 kg/m² as calculated from the following:    Height as of this encounter: 1.524 m (5').    Weight as of this encounter: 72.6 kg (160 lb).    Intake/Output Summary (Last 24 hours) at 4/1/2025 1616  Last data filed at 4/1/2025 1313  Gross per 24 hour   Intake 400 ml   Output 300 ml   Net 100 ml         Physical Exam:     General:    Pleasant cooperative no obvious acute distress-claims she feels better since being placed on oxygen  Head:  Normocephalic, atraumatic  Eyes:  Sclerae appear normal.  Pupils equally round.  ENT:  Nares appear normal.  Moist oral mucosa  Neck:  No restricted ROM.  Trachea midline   CV:   Rate controlled rhythm regular no gross JVD  Lungs:   Decreased breath sounds bilateral, no wheeze.  No gross consolidation  Abdomen:   Soft, nontender, nondistended.  Bowel sounds x 4 quadrants  Extremities: No cyanosis or clubbing.  No unilateral lower extremity edema  Neuro:  CN II-XII grossly intact.  No focal motor weakness  Psych:  Normal

## 2025-04-01 NOTE — PROGRESS NOTES
Pt sat up on side of bed for thoracentesis. Consent obtained.  Time out performed. Pts vitals monitored throughout procedure.  Left ultrasound done and pic taken of pleural fluid.  ~300 ml Shavonne pleural fluid from L.  Pt tolerated procedure well with no adverse rxn.  Specimens sent to the lab x 3 and labeled appropriately.  Site dressed appropriately and report given to pts RN.   Lung sliding done and ultrasound findings reviewed by MD.

## 2025-04-01 NOTE — CONSULTS
History and Physical Initial Visit NOTE           4/1/2025    Violet Siddiqui                        Date of Admission:  3/31/2025    The patient's chart is reviewed and the patient is discussed with the staff.    Subjective:     Patient is a 85 y.o.  female seen and evaluated at the request of Dr. Jeffrey Su for new pulmonary malignancy, SÁNCHEZ and pleural effusion. PMH includes left carotid artery stenosis, T2DM, HTN, CKD, hypothyroidism, SIADH, former tobacco abuse 60 total pack years- quit in 2014. Denies any known COPD, asthma or inhalers.    Presented to the ER after calling PCP and complaining of inability to void and abdominal pain. Once in ER she began complaining of severe shortness of breath and SÁNCHEZ over the last week. Notable ER labs CT A/P with long segment of wall thickening involving the sigmoid colon with trace pericolonic stranding, no abscess or extraluminal air seen. CT Chest with bilateral pleural effusions and bulky mediastinal lymphadenopathy and hilar lymphadenopathy. Denies any fevers, chills, cough or sick contacts. Denies any edema.     Review of Systems: Comprehensive ROS negative except in HPI    Current Outpatient Medications   Medication Instructions    aspirin 81 mg, Oral, 2 TIMES DAILY    atorvastatin (LIPITOR) 10 mg, Oral, EVERY EVENING    Calcium Carbonate-Vitamin D (CALCIUM-VITAMIN D) 600-125 MG-UNIT TABS 1 tablet, Oral, DAILY WITH LUNCH    coenzyme Q10 100 mg, Oral, DAILY WITH LUNCH    levothyroxine (SYNTHROID) 88 MCG tablet Take 1/2 tab on Sunday and one all other days Indications: a condition with low thyroid hormone levels    lisinopril (PRINIVIL;ZESTRIL) 20 mg, Oral, 2 TIMES DAILY    meclizine (ANTIVERT) 25 mg, Oral, 3 TIMES DAILY PRN    Multiple Vitamin (MULTI-VITAMINS PO) 1 tablet, Oral, DAILY    polyethylene glycol (GLYCOLAX) 17 g, Oral, DAILY PRN    sodium chloride 1 g, Oral, 3 TIMES DAILY WITH MEALS    urea (URE-NA) 15 g, Oral, 2 times daily     temperature source Axillary, resp. rate 17, height 1.524 m (5'), weight 72.6 kg (160 lb), SpO2 96%. No intake or output data in the 24 hours ending 04/01/25 0935  PHYSICAL EXAM   Constitutional:  the patient is well developed and in no acute distress  EENMT:  Sclera clear, pupils equal, oral mucosa moist  Respiratory: symmetric chest rise. Diminished posteriorly in L base on 2lpm  Cardiovascular:  RRR without M,G,R. There is no lower extremity edema.  Gastrointestinal: soft and non-tender; with positive bowel sounds.  Musculoskeletal: warm without cyanosis. Normal muscle tone.   Skin:  no jaundice or rashes, no wounds   Neurologic: symmetric strength, fluent speech  Psychiatric:  calm, appropriate, oriented x 4    Imaging: I performed an independent interpretation of the patient's images.  CT Chest: 3/31        Recent Labs     03/31/25  1844   WBC 11.4*   HGB 15.0   HCT 43.4         K 4.2      CO2 26   BUN 32*   CREATININE 0.67   MG 2.2   BILITOT 0.4   AST 49*   ALT 31   ALKPHOS 112*       Lab Results   Component Value Date/Time     03/31/2025 06:44 PM    K 4.2 03/31/2025 06:44 PM     03/31/2025 06:44 PM    CO2 26 03/31/2025 06:44 PM    BUN 32 03/31/2025 06:44 PM    CREATININE 0.67 03/31/2025 06:44 PM    GLUCOSE 117 03/31/2025 06:44 PM    CALCIUM 8.8 03/31/2025 06:44 PM      No results found for: \"BNP\"    ECHO: No results found for this or any previous visit.    MICRO: No results for input(s): \"CULTURE\" in the last 72 hours.  No results for input(s): \"COVID19\" in the last 72 hours.  Assessment and Plan:  (Medical Decision Making)   Impression: 84yo  female with PMH of left carotid artery stenosis, T2DM, HTN, CKD, hypothyroidism, former tobacco abuse 60 total pack years- quit in 2014. Presented to ER with abdominal pain and inability to void. During ER interview she also mentioned worsening SOB and SÁNCHEZ. CT Chest with lymphadenopathy and pleural effusions with concerns for  malignancy. We were consulted for further recommendations.    Principal Problem:    Hypoxia  Plan: currently on 2lpm, not on O2 at home, wean as tolerated. Will need mabulating sat closer to discharge.   Active Problems:    Pleural effusion  Plan: will plan for US and potential thoracentesis today if able. Will ask bronch team to add to schedule.     Mediastinal lymphadenopathy  Plan: with bulky mediastinal and hilar lymphadenopathy causing compression. Would recommend EBUS as OP, PET scan and potential colonoscopy given pericolonic stranding ?colitis.     Leukocytosis  Plan: WBC 11.4, may be reactionary, could potentially have some post obstructive pneumonia however afebrile, no cough so likelihood is low.      Message sent to schedule EBUS and PET.     DNR    Thank you very much for this referral.  We appreciate the opportunity to participate in this patient's care.  Will follow along with above stated plan.    In this split/shared evaluation I performed performed a medically appropriate history and exam, counseled and educated the patient and/or family member, ordered medications, tests or procedures, documented information in EMR, and coordinated care. which accounted for 20 minutes clinical time.     OTTO Nazario - CNP       ATTENDING ADDENDUM:    In this split/shared evaluation I performed reviewed the patients's H&P, available images, labs, cultures., discussed case in detail with NPP, performed a medically appropriate history and exam, counseled and educated the patient and/or family member, ordered and/or reviewed medications, tests or procedures, documented information in EMR, independently interpreted images, and coordinated care. which accounted for 25 minutes clinical time.     Impression:   Pt presenting with abd pain. Found to also have L>R pleural effusion, bulky mediastinal LAD and L hilar mass. All very concerning for primary lung cancer, small vs squamous cell.   Discussed this with

## 2025-04-01 NOTE — ED NOTES
TRANSFER - OUT REPORT:    Verbal report given to Gracie GARZA on Violet Siddiqui  being transferred to [facility] for routine progression of patient care       Report consisted of patient's Situation, Background, Assessment and   Recommendations(SBAR).     Information from the following report(s) Nurse Handoff Report, Index, ED Encounter Summary, ED SBAR, MAR, and Recent Results was reviewed with the receiving nurse.    [place] Fall Assessment:    Presents to emergency department  because of falls (Syncope, seizure, or loss of consciousness): No  Age > 70: Yes  Altered Mental Status, Intoxication with alcohol or substance confusion (Disorientation, impaired judgment, poor safety awaremess, or inability to follow instructions): No  Impaired Mobility: Ambulates or transfers with assistive devices or assistance; Unable to ambulate or transer.: No  Nursing Judgement: Yes          Lines:   Peripheral IV 03/31/25 Distal;Left;Anterior Wrist (Active)   Site Assessment Clean, dry & intact 03/31/25 2153   Line Status Normal saline locked 03/31/25 2153   Phlebitis Assessment No symptoms 03/31/25 2153   Infiltration Assessment 0 03/31/25 2153   Alcohol Cap Used Yes 03/31/25 2153   Dressing Status Clean, dry & intact 03/31/25 2153   Dressing Type Transparent 03/31/25 2153        Opportunity for questions and clarification was provided.      Patient transported with:  O2 @ 2lpm and Registered Nurse          Albin, Joyce, RN  03/31/25 2229

## 2025-04-02 VITALS
HEART RATE: 108 BPM | WEIGHT: 140.5 LBS | HEIGHT: 60 IN | TEMPERATURE: 98.2 F | BODY MASS INDEX: 27.58 KG/M2 | SYSTOLIC BLOOD PRESSURE: 149 MMHG | DIASTOLIC BLOOD PRESSURE: 61 MMHG | OXYGEN SATURATION: 99 % | RESPIRATION RATE: 14 BRPM

## 2025-04-02 LAB
ALBUMIN SERPL-MCNC: 2.9 G/DL (ref 3.2–4.6)
ALBUMIN/GLOB SERPL: 0.8 (ref 1–1.9)
ALP SERPL-CCNC: 110 U/L (ref 35–104)
ALT SERPL-CCNC: 23 U/L (ref 8–45)
ANION GAP SERPL CALC-SCNC: 11 MMOL/L (ref 7–16)
AST SERPL-CCNC: 45 U/L (ref 15–37)
BASOPHILS # BLD: 0.04 K/UL (ref 0–0.2)
BASOPHILS NFR BLD: 0.3 % (ref 0–2)
BILIRUB SERPL-MCNC: 0.4 MG/DL (ref 0–1.2)
BUN SERPL-MCNC: 20 MG/DL (ref 8–23)
CALCIUM SERPL-MCNC: 8.7 MG/DL (ref 8.8–10.2)
CHLORIDE SERPL-SCNC: 100 MMOL/L (ref 98–107)
CO2 SERPL-SCNC: 25 MMOL/L (ref 20–29)
CREAT SERPL-MCNC: 0.77 MG/DL (ref 0.6–1.1)
CYTOLOGY-NON GYN: NORMAL
DIFFERENTIAL METHOD BLD: ABNORMAL
EOSINOPHIL # BLD: 0.13 K/UL (ref 0–0.8)
EOSINOPHIL NFR BLD: 1 % (ref 0.5–7.8)
ERYTHROCYTE [DISTWIDTH] IN BLOOD BY AUTOMATED COUNT: 13.2 % (ref 11.9–14.6)
GLOBULIN SER CALC-MCNC: 3.6 G/DL (ref 2.3–3.5)
GLUCOSE SERPL-MCNC: 148 MG/DL (ref 70–99)
HCT VFR BLD AUTO: 43.4 % (ref 35.8–46.3)
HGB BLD-MCNC: 14.3 G/DL (ref 11.7–15.4)
IMM GRANULOCYTES # BLD AUTO: 0.07 K/UL (ref 0–0.5)
IMM GRANULOCYTES NFR BLD AUTO: 0.5 % (ref 0–5)
LYMPHOCYTES # BLD: 2.03 K/UL (ref 0.5–4.6)
LYMPHOCYTES NFR BLD: 14.8 % (ref 13–44)
MCH RBC QN AUTO: 30.4 PG (ref 26.1–32.9)
MCHC RBC AUTO-ENTMCNC: 32.9 G/DL (ref 31.4–35)
MCV RBC AUTO: 92.1 FL (ref 82–102)
MONOCYTES # BLD: 1.17 K/UL (ref 0.1–1.3)
MONOCYTES NFR BLD: 8.6 % (ref 4–12)
NEUTS SEG # BLD: 10.24 K/UL (ref 1.7–8.2)
NEUTS SEG NFR BLD: 74.8 % (ref 43–78)
NRBC # BLD: 0 K/UL (ref 0–0.2)
PLATELET # BLD AUTO: 263 K/UL (ref 150–450)
PMV BLD AUTO: 10.2 FL (ref 9.4–12.3)
POTASSIUM SERPL-SCNC: 3.7 MMOL/L (ref 3.5–5.1)
PROT SERPL-MCNC: 6.5 G/DL (ref 6.3–8.2)
RBC # BLD AUTO: 4.71 M/UL (ref 4.05–5.2)
SODIUM SERPL-SCNC: 136 MMOL/L (ref 136–145)
SPECIMEN SOURCE: NORMAL
WBC # BLD AUTO: 13.7 K/UL (ref 4.3–11.1)

## 2025-04-02 PROCEDURE — 85025 COMPLETE CBC W/AUTO DIFF WBC: CPT

## 2025-04-02 PROCEDURE — G0378 HOSPITAL OBSERVATION PER HR: HCPCS

## 2025-04-02 PROCEDURE — 36415 COLL VENOUS BLD VENIPUNCTURE: CPT

## 2025-04-02 PROCEDURE — 80053 COMPREHEN METABOLIC PANEL: CPT

## 2025-04-02 PROCEDURE — 6360000002 HC RX W HCPCS: Performed by: STUDENT IN AN ORGANIZED HEALTH CARE EDUCATION/TRAINING PROGRAM

## 2025-04-02 PROCEDURE — 6370000000 HC RX 637 (ALT 250 FOR IP): Performed by: STUDENT IN AN ORGANIZED HEALTH CARE EDUCATION/TRAINING PROGRAM

## 2025-04-02 PROCEDURE — 96372 THER/PROPH/DIAG INJ SC/IM: CPT

## 2025-04-02 RX ORDER — TRAZODONE HYDROCHLORIDE 50 MG/1
50 TABLET ORAL NIGHTLY PRN
Qty: 30 TABLET | Refills: 0 | Status: SHIPPED | OUTPATIENT
Start: 2025-04-02

## 2025-04-02 RX ORDER — OXYCODONE HYDROCHLORIDE 5 MG/1
5 TABLET ORAL EVERY 6 HOURS PRN
Qty: 12 TABLET | Refills: 0 | Status: SHIPPED | OUTPATIENT
Start: 2025-04-02 | End: 2025-04-05

## 2025-04-02 RX ADMIN — Medication 1 G: at 12:04

## 2025-04-02 RX ADMIN — ASPIRIN 81 MG: 81 TABLET ORAL at 08:50

## 2025-04-02 RX ADMIN — LEVOTHYROXINE SODIUM 88 MCG: 0.09 TABLET ORAL at 05:10

## 2025-04-02 RX ADMIN — Medication 1 G: at 08:49

## 2025-04-02 RX ADMIN — Medication 15 G: at 08:48

## 2025-04-02 RX ADMIN — ALUMINUM HYDROXIDE, MAGNESIUM HYDROXIDE, AND SIMETHICONE 30 ML: 200; 200; 20 SUSPENSION ORAL at 02:11

## 2025-04-02 RX ADMIN — LORAZEPAM 0.5 MG: 0.5 TABLET ORAL at 02:11

## 2025-04-02 RX ADMIN — ENOXAPARIN SODIUM 40 MG: 100 INJECTION SUBCUTANEOUS at 08:50

## 2025-04-02 ASSESSMENT — PAIN SCALES - GENERAL: PAINLEVEL_OUTOF10: 4

## 2025-04-02 NOTE — PLAN OF CARE
She is planning to be discharged today. Lise Bartolo will arrange PET Scan and will also plan to follow up pleural fluid cytology to determine next steps.  She is on RA.     Thoracenteses  Date:   LEFT RIGHT  DESCRIPTION   4/1 300 cc  transudative           Pleural Fluid Only Analysis (PFO3)  If any one of the 3 are positive, the pleural fluid is considered an exudate:  []  Pleural fluid protein greater than 3.0 g/dL (30 g/L)  []  Pleural fluid cholesterol greater than 55 mg/dL (1.424 mmol/L)  []  Pleural fluid LDH greater than 0.67 times the upper limit of the laboratory's normal serum LDH of 281 (=187).    Cytology pending    Shavonne Dixon, APRN - CNP

## 2025-04-02 NOTE — TELEPHONE ENCOUNTER
Notification from Mrs Dixon that patient to be discharged today.  Scheduled PET scan on 4/7 and called patient's cell phone #.  No answer. Spoke with Riky on 6th floor.  She will give patient instructions.

## 2025-04-02 NOTE — DISCHARGE SUMMARY
Hospitalist Discharge Summary   Admit Date:  3/31/2025  6:04 PM   DC Note date: 2025  Name:  Violet Siddiqui   Age:  85 y.o.  Sex:  female  :  1940   MRN:  [id number]   Room:  [facility]  PCP:  Zuleika Medrano MD    Presenting Complaint: Abdominal Pain (6-9 months), Fatigue (6-9 months), and Shortness of Breath (X1 week)     Initial Admission Diagnosis: Lung mass [R91.8]  Hypoxia [R09.02]  Pleural effusion, left [J90]     Problem List for this Hospitalization (present on admission):    Principal Problem:    Hypoxia  Active Problems:    Pleural effusion    Mediastinal lymphadenopathy    Lung mass    Pleural effusion, left  Resolved Problems:    * No resolved hospital problems. *      Hospital Course:  From admission history and physical HPI:  85-year-old female with PAD status post left carotid endarterectomy, [facility] (recently discharged from [facility]  with increased salt tablets) presents with c/o abdominal tightness and discomfort.  She spoke on the phone to her PCP earlier today (3/31), complaining of abdominal tightness, nausea as well as inability to completely void.  She spoke with her PCP, who recommended coming to the emergency room due to failure to void.  Upon additional interview in the emergency room, she is complaining of severe shortness of breath and worsening dyspnea on exertion over the last week.  Hospital course-  Patient had subjective dyspnea relieved with nasal cannula oxygen but did not qualify for home oxygen at rest.  She underwent thoracentesis with formal final result pending regarding cytology-possible exudative but would be consistent with expected malignancy.  No fevers shaking chills or evidence of definite an infectious process.  Patient has pain related to sciatica and other-DJD and requested medication other than tramadol which causes headaches even though listed as an outpatient.  Given a 3-day prescription for oxycodone.  Discussed possible  Culture, Body Fluid (with Gram Stain) [[id number]] Collected: 04/01/25 1120    Order Status: Sent Specimen: Body Fluid from Thoracentesis     Culture, Body Fluid (with Gram Stain) [[id number]] Collected: 04/01/25 1115    Order Status: Completed Specimen: Pleural Fluid Updated: 04/02/25 0800     Special Requests NO SPECIAL REQUESTS        Gram Stain MANY WBCS SEEN         NO DEFINITE ORGANISM SEEN        Culture NO GROWTH 1 DAY               All Labs from Last 24 Hrs:  Recent Results (from the past 24 hours)   CBC with Auto Differential    Collection Time: 04/02/25  2:53 AM   Result Value Ref Range    WBC 13.7 (H) 4.3 - 11.1 K/uL    RBC 4.71 4.05 - 5.2 M/uL    Hemoglobin 14.3 11.7 - 15.4 g/dL    Hematocrit 43.4 35.8 - 46.3 %    MCV 92.1 82 - 102 FL    MCH 30.4 26.1 - 32.9 PG    MCHC 32.9 31.4 - 35.0 g/dL    RDW 13.2 11.9 - 14.6 %    Platelets 263 150 - 450 K/uL    MPV 10.2 9.4 - 12.3 FL    nRBC 0.00 0.0 - 0.2 K/uL    Differential Type AUTOMATED      Neutrophils % 74.8 43.0 - 78.0 %    Lymphocytes % 14.8 13.0 - 44.0 %    Monocytes % 8.6 4.0 - 12.0 %    Eosinophils % 1.0 0.5 - 7.8 %    Basophils % 0.3 0.0 - 2.0 %    Immature Granulocytes % 0.5 0.0 - 5.0 %    Neutrophils Absolute 10.24 (H) 1.70 - 8.20 K/UL    Lymphocytes Absolute 2.03 0.50 - 4.60 K/UL    Monocytes Absolute 1.17 0.10 - 1.30 K/UL    Eosinophils Absolute 0.13 0.00 - 0.80 K/UL    Basophils Absolute 0.04 0.00 - 0.20 K/UL    Immature Granulocytes Absolute 0.07 0.0 - 0.5 K/UL   Comprehensive Metabolic Panel w/ Reflex to MG    Collection Time: 04/02/25  2:53 AM   Result Value Ref Range    Sodium 136 136 - 145 mmol/L    Potassium 3.7 3.5 - 5.1 mmol/L    Chloride 100 98 - 107 mmol/L    CO2 25 20 - 29 mmol/L    Anion Gap 11 7 - 16 mmol/L    Glucose 148 (H) 70 - 99 mg/dL    BUN 20 8 - 23 MG/DL    Creatinine 0.77 0.60 - 1.10 MG/DL    Est, Glom Filt Rate 76 >60 ml/min/1.73m2    Calcium 8.7 (L) 8.8 - 10.2 MG/DL    Total Bilirubin 0.4 0.0 - 1.2 MG/DL    ALT 23 8 - 45

## 2025-04-02 NOTE — PLAN OF CARE
Problem: Chronic Conditions and Co-morbidities  Goal: Patient's chronic conditions and co-morbidity symptoms are monitored and maintained or improved  4/2/2025 0246 by Laura Nieves RN  Outcome: Progressing     Problem: Discharge Planning  Goal: Discharge to home or other facility with appropriate resources  4/2/2025 1004 by Ivonne Mcnally RN  Outcome: Progressing  4/2/2025 0246 by Laura Nieves RN  Outcome: Progressing     Problem: Pain  Goal: Verbalizes/displays adequate comfort level or baseline comfort level  4/2/2025 1004 by Ivonne Mcnally RN  Outcome: Progressing  4/2/2025 0246 by Laura Nieves RN  Outcome: Progressing  Flowsheets (Taken 4/1/2025 1947)  Verbalizes/displays adequate comfort level or baseline comfort level:   Encourage patient to monitor pain and request assistance   Assess pain using appropriate pain scale   Administer analgesics based on type and severity of pain and evaluate response     Problem: Safety - Adult  Goal: Free from fall injury  4/2/2025 1004 by Ivonne Mcnally RN  Outcome: Progressing  4/2/2025 0246 by Laura Nieves RN  Outcome: Progressing

## 2025-04-02 NOTE — CARE COORDINATION
Assessment completed with patient at bedside. Patient lives with spouse. She has a cane and rolling walker at home. She does not drive, family and spouse assist with transportation. Demographics and PCP confirmed. Patient is current with [facility]. BENNY orders sent. Patient discharging home today, family providing transport home.     04/02/25 1312   Service Assessment   Patient Orientation Alert and Oriented   Cognition Alert   History Provided By Patient   Primary Caregiver Self   Support Systems Spouse/Significant Other;Family Members   Patient's Healthcare Decision Maker is: Legal Next of Kin   PCP Verified by CM Yes   Prior Functional Level Independent in ADLs/IADLs   Current Functional Level Independent in ADLs/IADLs   Can patient return to prior living arrangement Yes   Ability to make needs known: Good   Family able to assist with home care needs: Yes   Would you like for me to discuss the discharge plan with any other family members/significant others, and if so, who? Yes  (spouse)   Financial Resources Medicare   Community Resources None   Social/Functional History   Lives With Spouse   Type of Home House   Condition of Participation: Discharge Planning   The Plan for Transition of Care is related to the following treatment goals: return home   The Patient and/or Patient Representative was provided with a Choice of Provider? Patient   The Patient and/Or Patient Representative agree with the Discharge Plan? Yes   Freedom of Choice list was provided with basic dialogue that supports the patient's individualized plan of care/goals, treatment preferences, and shares the quality data associated with the providers?  Yes         Chirag GUY, ACM  [place]

## 2025-04-02 NOTE — PLAN OF CARE
Problem: Chronic Conditions and Co-morbidities  Goal: Patient's chronic conditions and co-morbidity symptoms are monitored and maintained or improved  Outcome: Progressing     Problem: Discharge Planning  Goal: Discharge to home or other facility with appropriate resources  Outcome: Progressing     Problem: Pain  Goal: Verbalizes/displays adequate comfort level or baseline comfort level  Outcome: Progressing  Flowsheets (Taken 4/1/2025 1947)  Verbalizes/displays adequate comfort level or baseline comfort level:   Encourage patient to monitor pain and request assistance   Assess pain using appropriate pain scale   Administer analgesics based on type and severity of pain and evaluate response     Problem: Safety - Adult  Goal: Free from fall injury  Outcome: Progressing

## 2025-04-03 ENCOUNTER — CARE COORDINATION (OUTPATIENT)
Dept: CARE COORDINATION | Facility: CLINIC | Age: 85
End: 2025-04-03

## 2025-04-03 LAB
BACTERIA SPEC CULT: NORMAL
GRAM STN SPEC: NORMAL
GRAM STN SPEC: NORMAL
SERVICE CMNT-IMP: NORMAL

## 2025-04-03 NOTE — CARE COORDINATION
Care Transitions Note    Initial Call - Call within 2 business days of discharge: Yes    Patient Current Location:  Home: [address] [place] [id number]    Care Transition Nurse contacted the patient by telephone to perform post hospital discharge assessment, verified name and  as identifiers.  Provided introduction to self, and explanation of the Care Transition Nurse role.    Patient: Violet Siddiqui Patient : 1940   MRN: [id number]    Reason for Admission: Abdominal Pain; Fatigue; Shortness of Breath  Discharge Date: 25  RURS: Readmission Risk Score: 13.8    Last Discharge Facility       Date Complaint Diagnosis Description Type Department Provider    3/31/25 Abdominal Pain; Fatigue; Shortness of Breath Sciatica, unspecified laterality ... ED to Hosp-Admission (Discharged) (ADMITTED) [id number] Lyle Pearce, ; Archie, ...        Was this an external facility discharge? No    Additional needs identified to be addressed with provider   No needs identified             Method of communication with provider: none.    Patients top risk factors for readmission: medical condition-Hypoxia, Left pleural effusion, S/P thoracentesis, Lung mass, PAD, SIADH, DM, CRD    Interventions to address risk factors:   Spouse reports patient had a good night and denies any needs at this time.   Reviewed discharge instructions and offered opportunity to ask any questions regarding discharge instructions.  Confirmed medications obtained and taking as ordered  CTN encouraged self-monitoring and when to seek care.  Spouse reports patient's BP this morning was 111/84.   Spouse declines CTN assist to scheduled follow up appointments. Spouse prefers to wait for Pulmonary to call and would like to wait for PCP follow up at this time. Spouse is aware of upcoming appointments. Patient was discharged with [facility] and kermit is scheduled for next week per spouse.   CTN encouraged patient/spouse to reach out I

## 2025-04-04 RX ORDER — CALCIUM CARBONATE 160(400)MG
1 TABLET,CHEWABLE ORAL DAILY
Qty: 1 EACH | Refills: 0 | Status: SHIPPED | OUTPATIENT
Start: 2025-04-04

## 2025-04-04 NOTE — TELEPHONE ENCOUNTER
Pt spouse called back asking for update. Spoke with spouse and states he wasn't at hospital when she was discharged (daughter was with her), so rollator wasn't requested. Would like for rx to go to [facility].    I called [facility] and they said they don't have an order form. Just needs an order faxed to them along with documentation of need to [phone].    Order pended. Can you sign?

## 2025-04-04 NOTE — TELEPHONE ENCOUNTER
Patients spouse called requesting an order for a walker rollator states he would like to get it picked up today since spouse was just discharged from hospital.    Please advise.

## 2025-04-05 LAB
CHOLESTEROL, TOTAL: 30 MG/DL
SPECIMEN SOURCE: NORMAL

## 2025-04-06 ENCOUNTER — TELEPHONE (OUTPATIENT)
Dept: INTERNAL MEDICINE CLINIC | Facility: CLINIC | Age: 85
End: 2025-04-06

## 2025-04-07 ENCOUNTER — TELEPHONE (OUTPATIENT)
Dept: INTERNAL MEDICINE CLINIC | Facility: CLINIC | Age: 85
End: 2025-04-07

## 2025-04-07 ENCOUNTER — HOSPITAL ENCOUNTER (OUTPATIENT)
Dept: PET IMAGING | Age: 85
Discharge: HOME | End: 2025-04-10
Payer: MEDICARE

## 2025-04-07 ENCOUNTER — TELEPHONE (OUTPATIENT)
Dept: PULMONOLOGY | Age: 85
End: 2025-04-07

## 2025-04-07 DIAGNOSIS — J90 PLEURAL EFFUSION: ICD-10-CM

## 2025-04-07 DIAGNOSIS — R59.0 MEDIASTINAL LYMPHADENOPATHY: ICD-10-CM

## 2025-04-07 DIAGNOSIS — J90 PLEURAL EFFUSION, LEFT: ICD-10-CM

## 2025-04-07 DIAGNOSIS — R91.8 LUNG MASS: ICD-10-CM

## 2025-04-07 LAB
GLUCOSE BLD STRIP.AUTO-MCNC: 148 MG/DL (ref 65–100)
SERVICE CMNT-IMP: ABNORMAL

## 2025-04-07 PROCEDURE — A9609 HC RX DIAGNOSTIC RADIOPHARMACEUTICAL: HCPCS | Performed by: STUDENT IN AN ORGANIZED HEALTH CARE EDUCATION/TRAINING PROGRAM

## 2025-04-07 PROCEDURE — 2500000003 HC RX 250 WO HCPCS: Performed by: STUDENT IN AN ORGANIZED HEALTH CARE EDUCATION/TRAINING PROGRAM

## 2025-04-07 PROCEDURE — 6360000004 HC RX CONTRAST MEDICATION: Performed by: STUDENT IN AN ORGANIZED HEALTH CARE EDUCATION/TRAINING PROGRAM

## 2025-04-07 PROCEDURE — 78815 PET IMAGE W/CT SKULL-THIGH: CPT

## 2025-04-07 PROCEDURE — 82962 GLUCOSE BLOOD TEST: CPT

## 2025-04-07 PROCEDURE — 3430000000 HC RX DIAGNOSTIC RADIOPHARMACEUTICAL: Performed by: STUDENT IN AN ORGANIZED HEALTH CARE EDUCATION/TRAINING PROGRAM

## 2025-04-07 RX ORDER — SODIUM CHLORIDE 0.9 % (FLUSH) 0.9 %
20 SYRINGE (ML) INJECTION AS NEEDED
Status: DISCONTINUED | OUTPATIENT
Start: 2025-04-07 | End: 2025-04-10

## 2025-04-07 RX ORDER — DIATRIZOATE MEGLUMINE AND DIATRIZOATE SODIUM 660; 100 MG/ML; MG/ML
10 SOLUTION ORAL; RECTAL
Status: DISCONTINUED | OUTPATIENT
Start: 2025-04-07 | End: 2025-04-10

## 2025-04-07 RX ORDER — FLUDEOXYGLUCOSE F 18 200 MCI/ML
12.67 INJECTION, SOLUTION INTRAVENOUS
Status: COMPLETED | OUTPATIENT
Start: 2025-04-07 | End: 2025-04-07

## 2025-04-07 RX ADMIN — SODIUM CHLORIDE, PRESERVATIVE FREE 20 ML: 5 INJECTION INTRAVENOUS at 13:35

## 2025-04-07 RX ADMIN — DIATRIZOATE MEGLUMINE AND DIATRIZOATE SODIUM 10 ML: 660; 100 LIQUID ORAL; RECTAL at 13:35

## 2025-04-07 RX ADMIN — FLUDEOXYGLUCOSE F 18 12.67 MILLICURIE: 200 INJECTION, SOLUTION INTRAVENOUS at 13:35

## 2025-04-07 NOTE — TELEPHONE ENCOUNTER
Patient scheduled for PET scan today at 1:30 and is requesting medication for anxiety; pharmacy is [facility] the [address]

## 2025-04-07 NOTE — TELEPHONE ENCOUNTER
[facility] CALL      Complaint: SOB/Wheezing  Cough: yes  Productive:  no  Bloody Sputum:  no  Increased SOB/Wheezing:  yes  Duration: 2 days  Fever/Chills: no  OTC Meds tried: none  Had a rough night last night with a lot of SOB/wheezing.   gave her some of his 02  Asking for inhaler

## 2025-04-08 NOTE — H&P
Hospitalist History and Physical   Admit Date:  2025 11:10 AM   Name:  Violet Siddiqui   Age:  85 y.o.  Sex:  female  :  1940   MRN:  [id number]   Room:  [place] [address]    Presenting/Chief Complaint: Shortness of Breath (Upon exertion. X multiple months. 88% RA)     Reason(s) for Admission: Pleural effusion [J90]     History of Present Illness:   85-year-old female history of of left carotid endarterectomy, SIADH, presents with shortness of breath.  Patient has been recently discharged from the hospital .  CT scan revealed mass on lungs.  Large left pleural effusion was drained during that hospital stay.  Patient was in outpatient setting and had PET scan yesterday and noticed wheezing and progressively worsening shortness of breath after that scan.  Worsening shortness of breath with any exertion.  Patient presented with known hypoxia with oxygen saturation of 88% on room air.      Assessment & Plan:   Pleural effusion  -Recent diagnosis of malignant effusion last week requiring thoracentesis  - Pulmonology consulted  - Progressively worsening shortness of breath  - Per chest x-ray appears reaccumulation of effusion.  Pulmonology consulted for possible thoracentesis, Pleurx cath?    SIADH  - Continue salt tablets  - Continue urea  - Fluid restriction    Hypothyroidism  - Continue levothyroxine    Hypertension  - Continue home antihypertensives    Peripheral artery disease status post left CEA  - Continue home atorvastatin, aspirin      PT/OT evals ordered?  Therapy evals ordered  Diet: ADULT DIET; Regular; 3 carb choices (45 gm/meal); Low Fat/Low Chol/High Fiber/2 gm Na; No Added Salt (3-4 gm)  VTE prophylaxis: Lovenox  Code status: Full Code      Non-peripheral Lines and Tubes (if present):             Hospital Problems:  Principal Problem:    Pleural effusion  Resolved Problems:    * No resolved hospital problems. *        Objective:   Patient Vitals for the past 24 hrs:   Temp Pulse Resp BP

## 2025-04-08 NOTE — FLOWSHEET NOTE
Patient arriving from home via EMS with complaints of shortness of breath upon exertion. Patient has been SOB for months now and is currently being evaluated for lung cancer.

## 2025-04-08 NOTE — ED PROVIDER NOTES
Emergency Department Provider Note       PCP: Zuleika Medrano MD   Age: 85 y.o.   Sex: female     DISPOSITION Decision To Admit 04/08/2025 01:23:37 PM    ICD-10-CM    1. Hypoxia  R09.02       2. Pleural effusion  J90           Medical Decision Making     Patient is hypoxic here, likely will require repeat thoracentesis, may benefit from a pleural drain placement.  I contacted oncology, but the patient has not established yet with oncology and it looks like cytology was negative from her previous pleural effusion studies.  As a result, I have discussed case with the hospitalist who will admit for further management.     1 acute illness with systemic symptoms.  Discussion with external consultants.  Shared medical decision making was utilized in creating the patients health plan today.  I independently ordered and reviewed each unique test.    I reviewed external records: provider visit note from PCP.  I reviewed external records: provider visit note from outside specialist.   The patients assessment required an independent historian: EMS.  The reason they were needed is important historical information not provided by the patient.  ED cardiac monitoring rhythm strip was ordered and interpreted:  sinus rhythm, no evidence of an arrhythmia  ST Segments:Normal ST segments - NO STEMI   Rate: 95, occassional PAC  I interpreted the X-rays recurrent large pleural effusion.  ED provider's independent EKG interpretation sinus rhythm, occasional PACs, no ST segment changes or T wave inversion            History   HISTORY OF PRESENT ILLNESS:    An 85-year-old female was transported to the ED by EMS with a chief complaint of shortness of breath. The history was provided by both the patient and the EMT. This is her second visit to the ED within a week; during her previous visit, she experienced trouble breathing, and a CT scan revealed a mass on the lungs. A large left pleural effusion was drained during that hospital stay.  see recent CT chest.         Electronically signed by Haseeb Gonzalez                   No results for input(s): \"COVID19\" in the last 72 hours.     Voice dictation software was used during the making of this note.  This software is not perfect and grammatical and other typographical errors may be present.  This note has not been completely proofread for errors.     Wilda, Colby SCHAFFER MD  04/08/25 5565

## 2025-04-09 PROBLEM — I48.91 ATRIAL FIBRILLATION (HCC): Status: ACTIVE | Noted: 2025-01-01

## 2025-04-09 PROBLEM — J96.00 ACUTE RESPIRATORY FAILURE (HCC): Status: ACTIVE | Noted: 2025-04-09

## 2025-04-09 PROBLEM — J81.0 ACUTE PULMONARY EDEMA (HCC): Status: ACTIVE | Noted: 2025-04-09

## 2025-04-09 NOTE — RESULT ENCOUNTER NOTE
Pt's pleural fluid cytology is negative.  She is currently hospitalized again and has been seen by Dr. Cuevas today.  She had a PET scan but this is not yet read.  My review shows bulky mediastinal and supraclavicular lymphadenopathy.  I agree with her plan to repeat a thoracentesis and they should repeat cytology.  She will need a bronchoscopy with EBUS set up as an outpatient with anesthesiology sedating.

## 2025-04-09 NOTE — ED NOTES
Placed on Bipap. Pt appears very comfortable at this moment     Natacha Dooley, RN  04/09/25 8812

## 2025-04-09 NOTE — PROGRESS NOTES
Hospitalist Progress Note   Admit Date:  2025 11:10 AM   Name:  Violet Siddiqui   Age:  85 y.o.  Sex:  female  :  1940   MRN:  [id number]   Room:  [place] [address]    Presenting/Chief Complaint: Shortness of Breath (Upon exertion. X multiple months. 88% RA)     Reason(s) for Admission: Pleural effusion [J90]     Hospital Course:   85-year-old female history of of left carotid endarterectomy, SIADH, presents with shortness of breath. Patient has been recently discharged from the hospital . CT scan revealed mass on lungs. Large left pleural effusion was drained during that hospital stay. Patient was in outpatient setting and had PET scan yesterday and noticed wheezing and progressively worsening shortness of breath after that scan. Worsening shortness of breath with any exertion. Patient presented with known hypoxia with oxygen saturation of 88% on room air.       Subjective & 24hr Events:   Patient was seen and examined at bedside.  No overnight events.  Patient more dyspneic this morning found to be new onset atrial fibrillation with rapid ventricular response this morning.        Assessment & Plan:   Pleural effusion  -Recent diagnosis of malignant effusion last week requiring thoracentesis  - Pulmonology consulted  - Progressively worsening shortness of breath  - Per chest x-ray appears reaccumulation of effusion.  Pulmonology consulted for possible thoracentesis, Pleurx cath?    New onset atrial fibrillation with rapid ventricular response  - Cardiology consulted  - Started on amnio drip  - EKG with evidence of A-fib RVR  - Heparin drip  - Heparin drip placed on hold tomorrow 4/10 at 8 AM for 6 hours prior to thoracentesis per pulmonology recommendations  - Echo pending    Acute respiratory failure  - Patient started on CPAP further supportive care once patient was in A-fib RVR  - Pulmonology consulted and following  - Repeat chest x-ray in a.m.     SIADH  - Continue salt tablets  - Continue

## 2025-04-09 NOTE — ED NOTES
Report called to KAYLA Royal in CCU.  Pt remains in Afib but at a controlled rate.  #22 diffusix jelco via the left hand and a #22 jelco via the right forearm.  Pt transported to CCU via the stretcher attached to cardiac monitor     Natacha Dooley RN  04/09/25 3491

## 2025-04-09 NOTE — PROGRESS NOTES
Patient voided a large amount of yellow urine in brief. Amount was unable to be recorded due to purewick did not suction appropriately.

## 2025-04-09 NOTE — PLAN OF CARE
Problem: Safety - Adult  Goal: Free from fall injury  Outcome: Progressing     Problem: Skin/Tissue Integrity  Goal: Skin integrity remains intact  Outcome: Progressing     Problem: Chronic Conditions and Co-morbidities  Goal: Patient's chronic conditions and co-morbidity symptoms are monitored and maintained or improved  Outcome: Progressing

## 2025-04-09 NOTE — H&P (VIEW-ONLY)
History and Physical Initial Visit NOTE           4/9/2025    Violet Siddiqui                        Date of Admission:  4/8/2025    The patient's chart is reviewed and the patient is discussed with the staff.    Subjective:     Patient is a 85 y.o.  female seen and evaluated at the request of Dr. Howard for recurrent pleural effusion.    She was seen by on 4/1 for left pleural effusion.   She has new concern for pulmonary malignancy, SÁNCHEZ and recurrent left pleural effusion with negative cytology. Her PMH includes left carotid artery stenosis, T2DM, HTN, CKD, hypothyroidism, SIADH, former tobacco abuse 60 total pack years- quit in 2014. She has not seen cardiology in the past. Denies any known COPD, asthma or inhalers.     Presented to the ED with SOB. CXR with recurrent left effusion. Her recent CT A/P with long segment of wall thickening involving the sigmoid colon with trace pericolonic stranding, no abscess or extraluminal air seen. CT Chest with bilateral pleural effusions and bulky mediastinal lymphadenopathy and hilar lymphadenopathy. Pleural fluid was without malignant cells and EBUS for further work up after PET scan was discussed. She had her PET scan on Monday.  When seen, HR in 150-160's, new onset atrial fibrillation with RVR. She is hypotensive with tachypnea. She is is on NC with sat 95%. Cardiology called to see. We were asked to see her for acute respiratory failure with a new onset a fib RVR with hypotension.     Her daughter confirms DNI but wants medications and CPR if needed. She lives with her , walks, and is not on oxygen at home.     Review of Systems: Comprehensive ROS negative except in HPI    Current Outpatient Medications   Medication Instructions    ALPRAZolam (XANAX) 0.25 mg, Oral, DAILY PRN, Take 30 minutes prior to scan today. May repeat if needed.    aspirin 81 mg, 2 TIMES DAILY    atorvastatin (LIPITOR) 10 mg, Oral, EVERY EVENING    Calcium

## 2025-04-09 NOTE — CONSULTS
Occupational History    Not on file   Tobacco Use    Smoking status: Former     Current packs/day: 0.00     Average packs/day: 1.5 packs/day for 40.0 years (60.0 ttl pk-yrs)     Types: Cigarettes     Quit date: 3/21/2014     Years since quittin.0    Smokeless tobacco: Never   Vaping Use    Vaping status: Never Used   Substance and Sexual Activity    Alcohol use: No    Drug use: Never    Sexual activity: Not Currently     Partners: Male   Other Topics Concern    Not on file   Social History Narrative    , walks the dog, yard work     Social Drivers of Health     Financial Resource Strain: Low Risk  (2024)    Overall Financial Resource Strain (CARDIA)     Difficulty of Paying Living Expenses: Not hard at all   Food Insecurity: No Food Insecurity (2025)    Hunger Vital Sign     Worried About Running Out of Food in the Last Year: Never true     Ran Out of Food in the Last Year: Never true   Transportation Needs: No Transportation Needs (2025)    PRAPARE - Transportation     Lack of Transportation (Medical): No     Lack of Transportation (Non-Medical): No   Physical Activity: Insufficiently Active (2024)    Exercise Vital Sign     Days of Exercise per Week: 2 days     Minutes of Exercise per Session: 20 min   Stress: Not on file   Social Connections: Unknown (3/20/2021)    Received from [facility]    Social Connections     Frequency of Communication with Friends and Family: Not asked     Frequency of Social Gatherings with Friends and Family: Not asked   Intimate Partner Violence: Unknown (3/20/2021)    Received from [facility]    Intimate Partner Violence     Fear of Current or Ex-Partner: Not asked     Emotionally Abused: Not asked     Physically Abused: Not asked     Sexually Abused: Not asked   Housing Stability: Low Risk  (2025)    Housing Stability Vital Sign     Unable to Pay for Housing in the Last Year: No     Number of Times Moved in the   --  101   CO2 26  --  31*   BUN 55*  --  28*   CREATININE 0.81  --  0.70   MG 2.4  --   --    BILITOT 0.3  --  0.4   AST 87*  --  56*   ALT 67*  --  52*   ALKPHOS 132*  --  125*       Lab Results   Component Value Date/Time     04/09/2025 07:54 AM    K 3.9 04/09/2025 07:54 AM     04/09/2025 07:54 AM    CO2 31 04/09/2025 07:54 AM    BUN 28 04/09/2025 07:54 AM    CREATININE 0.70 04/09/2025 07:54 AM    GLUCOSE 108 04/09/2025 07:54 AM    CALCIUM 8.8 04/09/2025 07:54 AM      No results found for: \"BNP\"    ECHO: No results found for this or any previous visit.    MICRO: No results for input(s): \"CULTURE\" in the last 72 hours.  No results for input(s): \"COVID19\" in the last 72 hours.  Assessment and Plan:  (Medical Decision Making)   Impression:  84yo  female with PMH of left carotid artery stenosis, T2DM, HTN, CKD, hypothyroidism, former tobacco abuse 60 total pack years- quit in 2014.     Principal Problem:    Pleural effusion  Plan: on left with 300 cc removed on 3/31. Now with re-accumulation but not likely large enough to be driving her HR. If heparin initiated, will need to hold for 6 hours prior to thoracentesis. Send cytology again, non-diagnostic pleural fluid, s/p PET and will need EBUS for diagnosis         New onset Atrial Fibrillation RVR  Plan: new,over the past 30-45 minutes. Will start amiodarone since she is hypotensive now and ask cardiology to see. She is on NC. Visibly SOB, tachypnic .TTE ordered.        Acute respiratory failure  Plan: marginal O2 sat with underlying Afib RVR with HR in 160-170's, cardiology called and now at bedside. Hypotensive, amiodarone ordered. Increased WOB and will start CPAP for supportive care until HR improved.        Recent inability to void, will need to do strict I/O, catheter if needed. If remains unstable or needs CPAP, she will need ICU vs telemetry. She is a DNI per her daughter.      Full Code    Thank you very much for this referral.  We  be held at 8:00 in the morning tomorrow in anticipation of possible thoracentesis at around 1 PM we will try to make arrangements.  Repeat chest x-ray in a.m.  Discussed with patient and her daughter at the bedside    Tank Koroma MD

## 2025-04-09 NOTE — CONSULTS
[facility] Cardiology Initial Cardiac Evaluation      Date of  Admission: 4/8/2025 11:10 AM     Primary Care Physician: Zuleika Medrano MD  Primary Cardiologist: none  Referring Physician: Shavonne Dixon NP  Supervising Physician: Dr. Sandra    CC/Reason for evaluation: afib rvr?    HPI:  Violet Siddiqui is a 85 y.o. female with prior history of left CEA, hypothyroid, SIADH who presents with progress SOB. Recently discharged from [facility] with malignant large left pleural effusion s/p thoracentesis. She was seen for outpatient PET 2 days ago and had notable wheezing and SOB. BMP on arrival unremarkable. ECG showed ST and 105. WBC 13.    Pulmonary consulted for recurrent pleural effusion. The patient was placed on bipap for respiratory support. The patient reported develop tachycardia, palpitations and hypotension while in the ER. ECG showed afib RVR at 160. Intermittently up to 180s on bedside monitor. IV amiodarone bolus and drip initiated per Cardiology recommendations.    She is relatively asymptomatic. Only mild palpitations.    Cardiology consulted for afib RVR.        Past Medical History:   Diagnosis Date    Carotid artery stenosis 10/1/2014    Depression 1/4/2016    Hearing loss Last 5 to 10 years    Mostly Left ear    Hyperlipidemia     Hypertension     Hypothyroidism Per Chart    Insomnia 1/4/2016    Irritable bowel syndrome 1/4/2016    Osteoarthritis 1/4/2016    Osteoporosis 1/4/2016    Peripheral artery insufficiency 1/4/2016    Peripheral vascular disease Mar 14, 2022    See Dr John Cuevas 1/4/2016    TIA (transient ischemic attack) 9/11/2016      Past Surgical History:   Procedure Laterality Date    APPENDECTOMY      BREAST SURGERY Left     cyst removed    CHOLECYSTECTOMY      COLONOSCOPY      CYST INCISION AND DRAINAGE Left 1980's    HIP FRACTURE SURGERY Right 3/2014    HYSTERECTOMY (CERVIX STATUS UNKNOWN)      HYSTERECTOMY, VAGINAL  1985    JOINT REPLACEMENT  2013    right hip    ORTHOPEDIC     MCH 30.5 26.1 - 32.9 PG    MCHC 31.5 31.4 - 35.0 g/dL    RDW 13.6 11.9 - 14.6 %    Platelets 395 150 - 450 K/uL    MPV 9.8 9.4 - 12.3 FL    nRBC 0.00 0.0 - 0.2 K/uL    Differential Type AUTOMATED      Neutrophils % 81.4 (H) 43.0 - 78.0 %    Lymphocytes % 8.7 (L) 13.0 - 44.0 %    Monocytes % 7.6 4.0 - 12.0 %    Eosinophils % 0.5 0.5 - 7.8 %    Basophils % 0.5 0.0 - 2.0 %    Immature Granulocytes % 1.3 0.0 - 5.0 %    Neutrophils Absolute 9.03 (H) 1.70 - 8.20 K/UL    Lymphocytes Absolute 0.97 0.50 - 4.60 K/UL    Monocytes Absolute 0.84 0.10 - 1.30 K/UL    Eosinophils Absolute 0.06 0.00 - 0.80 K/UL    Basophils Absolute 0.05 0.00 - 0.20 K/UL    Immature Granulocytes Absolute 0.14 0.0 - 0.5 K/UL   Comprehensive Metabolic Panel    Collection Time: 04/08/25 11:33 AM   Result Value Ref Range    Sodium 139 136 - 145 mmol/L    Potassium 4.1 3.5 - 5.1 mmol/L    Chloride 102 98 - 107 mmol/L    CO2 26 20 - 29 mmol/L    Anion Gap 11 7 - 16 mmol/L    Glucose 166 (H) 70 - 99 mg/dL    BUN 55 (H) 8 - 23 MG/DL    Creatinine 0.81 0.60 - 1.10 MG/DL    Est, Glom Filt Rate 71 >60 ml/min/1.73m2    Calcium 9.6 8.8 - 10.2 MG/DL    Total Bilirubin 0.3 0.0 - 1.2 MG/DL    ALT 67 (H) 8 - 45 U/L    AST 87 (H) 15 - 37 U/L    Alk Phosphatase 132 (H) 35 - 104 U/L    Total Protein 7.5 6.3 - 8.2 g/dL    Albumin 3.1 (L) 3.2 - 4.6 g/dL    Globulin 4.4 (H) 2.3 - 3.5 g/dL    Albumin/Globulin Ratio 0.7 (L) 1.0 - 1.9     Magnesium    Collection Time: 04/08/25 11:33 AM   Result Value Ref Range    Magnesium 2.4 1.8 - 2.4 mg/dL   CBC with Auto Differential    Collection Time: 04/09/25  6:08 AM   Result Value Ref Range    WBC 13.2 (H) 4.3 - 11.1 K/uL    RBC 4.47 4.05 - 5.2 M/uL    Hemoglobin 13.4 11.7 - 15.4 g/dL    Hematocrit 40.8 35.8 - 46.3 %    MCV 91.3 82 - 102 FL    MCH 30.0 26.1 - 32.9 PG    MCHC 32.8 31.4 - 35.0 g/dL    RDW 13.7 11.9 - 14.6 %    Platelets 409 150 - 450 K/uL    MPV 9.8 9.4 - 12.3 FL    nRBC 0.00 0.0 - 0.2 K/uL    Differential Type  AUTOMATED      Neutrophils % 73.8 43.0 - 78.0 %    Lymphocytes % 12.6 (L) 13.0 - 44.0 %    Monocytes % 10.6 4.0 - 12.0 %    Eosinophils % 1.4 0.5 - 7.8 %    Basophils % 0.4 0.0 - 2.0 %    Immature Granulocytes % 1.2 0.0 - 5.0 %    Neutrophils Absolute 9.70 (H) 1.70 - 8.20 K/UL    Lymphocytes Absolute 1.66 0.50 - 4.60 K/UL    Monocytes Absolute 1.39 (H) 0.10 - 1.30 K/UL    Eosinophils Absolute 0.19 0.00 - 0.80 K/UL    Basophils Absolute 0.05 0.00 - 0.20 K/UL    Immature Granulocytes Absolute 0.16 0.0 - 0.5 K/UL   Comprehensive Metabolic Panel w/ Reflex to MG    Collection Time: 04/09/25  7:54 AM   Result Value Ref Range    Sodium 143 136 - 145 mmol/L    Potassium 3.9 3.5 - 5.1 mmol/L    Chloride 101 98 - 107 mmol/L    CO2 31 (H) 20 - 29 mmol/L    Anion Gap 11 7 - 16 mmol/L    Glucose 108 (H) 70 - 99 mg/dL    BUN 28 (H) 8 - 23 MG/DL    Creatinine 0.70 0.60 - 1.10 MG/DL    Est, Glom Filt Rate 85 >60 ml/min/1.73m2    Calcium 8.8 8.8 - 10.2 MG/DL    Total Bilirubin 0.4 0.0 - 1.2 MG/DL    ALT 52 (H) 8 - 45 U/L    AST 56 (H) 15 - 37 U/L    Alk Phosphatase 125 (H) 35 - 104 U/L    Total Protein 6.6 6.3 - 8.2 g/dL    Albumin 2.8 (L) 3.2 - 4.6 g/dL    Globulin 3.8 (H) 2.3 - 3.5 g/dL    Albumin/Globulin Ratio 0.7 (L) 1.0 - 1.9     Magnesium    Collection Time: 04/09/25  7:54 AM   Result Value Ref Range    Magnesium 2.1 1.8 - 2.4 mg/dL   EKG 12 Lead    Collection Time: 04/09/25 10:52 AM   Result Value Ref Range    Ventricular Rate 157 BPM    Atrial Rate 156 BPM    QRS Duration 57 ms    Q-T Interval 283 ms    QTc Calculation (Bazett) 458 ms    R Axis 65 degrees    T Axis 65 degrees    Diagnosis       Atrial fibrillation with rapid V-rate  Repolarization abnormality, prob rate related    Confirmed by BRIDGER MANTILLA (), WYATT ELLIOTT ([id number]) on 4/9/2025 12:46:27 PM     APTT    Collection Time: 04/09/25 11:50 AM   Result Value Ref Range    APTT 24.2 23.3 - 37.4 SEC   Protime-INR    Collection Time: 04/09/25 11:50 AM   Result Value Ref Range

## 2025-04-09 NOTE — PROGRESS NOTES
Physical Therapy Note:    Attempted to see patient this AM for physical therapy evaluation session. Patient on hold per RN d/t unstable and worsening respiratory status. Will follow and re-attempt as schedule permits/patient available. Thank you,    Ashlee Hernandez, [facility]     Rehab Caseload Tracker

## 2025-04-09 NOTE — TELEPHONE ENCOUNTER
Patient is back in hospital with fluid on her lungs again. Patient is at still in the ER but she will be going to . Also they would like to know what is the results with the PET scan they done at the cancer center . Would like to  hear from Dr. Kolb

## 2025-04-09 NOTE — ED NOTES
Pt's HR increased to 150 bpm.  Pt is also c/o shortness of breath.  Shavonne NP here from pulmonary to assess the pt     Natacha Dooley, RN  04/09/25 4503

## 2025-04-09 NOTE — PROGRESS NOTES
Occupational Therapy Note:    Attempted to see patient this AM for occupational therapy evaluation session. Per RN, Patient has unstable and worsening respiratory status and asked therapy to HOLD at this time. Will follow and re-attempt as schedule permits/patient available. Thank you,    RUT HERNÁNDEZ, OT    Rehab Caseload Tracker

## 2025-04-10 ENCOUNTER — CARE COORDINATION (OUTPATIENT)
Dept: CARE COORDINATION | Facility: CLINIC | Age: 85
End: 2025-04-10

## 2025-04-10 PROBLEM — R93.5 ABNORMAL ABDOMINAL CT SCAN: Status: ACTIVE | Noted: 2025-01-01

## 2025-04-10 NOTE — PROGRESS NOTES
Hospitalist Progress Note   Admit Date:  2025 11:10 AM   Name:  Violet Siddiqui   Age:  85 y.o.  Sex:  female  :  1940   MRN:  [id number]   Room:  [facility]/    Presenting/Chief Complaint: Shortness of Breath (Upon exertion. X multiple months. 88% RA)     Reason(s) for Admission: Pleural effusion [J90]  Hypoxia [R09.02]  Atrial fibrillation, unspecified type (HCC) [I48.91]     Hospital Course:   85-year-old female history of of left carotid endarterectomy, SIADH, presents with shortness of breath. Patient has been recently discharged from the hospital . CT scan revealed mass on lungs. Large left pleural effusion was drained during that hospital stay. Patient was in outpatient setting and had PET scan yesterday and noticed wheezing and progressively worsening shortness of breath after that scan. Worsening shortness of breath with any exertion. Patient presented with known hypoxia with oxygen saturation of 88% on room air.     Subjective & 24hr Events:   Endorses chronic shortness of breath.  Endorses lack of appetite and lack of p.o. intake      Assessment & Plan:     Pleural effusion  Acute hypoxic respiratory failure  - Being evaluated for weight repeat thoracentesis.  Plan to check cytology and flow again as well  - Continuous pulse oximetry and wean as tolerated  - IV Lasix 40 mg twice daily    Atrial fibrillation, new onset  - Loading with amiodarone  - Heparin drip while awaiting thoracentesis    SIADH  - Serum chloride tablets 3 times a day  - Urea tablets 15 mg 2 times daily    Hypothyroidism  - Continue home Synthroid    Hypertension  - Not on antihypertensives    Peripheral arterial disease status post CEA  - Continue atorvastatin and aspirin  - Will be started on anticoagulation upon discharge unless contraindications     Transfer orders placed for telemetry bed    Patient is critically ill.  Without these interventions, there is a high probability of imminent acute organ impairment  04/09/25 11:50 AM   Result Value Ref Range    Protime 14.4 11.3 - 14.9 sec    INR 1.0     Anti-Xa, Unfractionated Heparin    Collection Time: 04/09/25 11:50 AM   Result Value Ref Range    Anti-XA Unfrac Heparin <0.1 (L) 0.3 - 0.7 IU/mL   Echo (TTE) complete (PRN contrast/bubble/strain/3D)    Collection Time: 04/09/25  3:12 PM   Result Value Ref Range    LV EDV A2C 48 mL    LV EDV A4C 65 mL    LV ESV A2C 21 mL    LV ESV A4C 22 mL    IVSd 0.6 0.6 - 0.9 cm    LVIDd 4.5 3.9 - 5.3 cm    LVIDs 3.0 cm    LVOT Diameter 2.0 cm    LVOT Mean Gradient 2 mmHg    LVOT VTI 22.0 cm    LVOT Peak Velocity 1.1 m/s    LVOT Peak Gradient 4 mmHg    LVPWd 0.5 (A) 0.6 - 0.9 cm    LV E' Lateral Velocity 6.96 cm/s    LV E' Septal Velocity 6.85 cm/s    LV Ejection Fraction A2C 57 %    LV Ejection Fraction A4C 66 %    EF BP 63 55 - 100 %    LVOT Area 3.1 cm2    LVOT SV 69.1 ml    LA Minor Axis 5.1 cm    LA Major [place] 4.7 cm    LA Area 2C 12.0 cm2    LA Area 4C 11.8 cm2    LA Volume MOD A2C 23 22 - 52 mL    LA Volume MOD A4C 24 22 - 52 mL    LA Volume BP 24 22 - 52 mL    LA Diameter 2.5 cm    AV Mean Velocity 1.0 m/s    AV Mean Gradient 4 mmHg    AV Mean Gradient 4 mmHg    AV VTI 29.3 cm    AV Peak Velocity 1.5 m/s    AV Peak Gradient 9 mmHg    AV Peak Gradient 9 mmHg    AV Area by VTI 2.4 cm2    AV Area by Peak Velocity 2.2 cm2    Aortic Root 2.6 cm    Ascending Aorta 2.5 cm    IVC Proxmal 1.8 cm    MV Max Velocity 1.1 m/s    MV Peak Gradient 5 mmHg    MV E Wave Deceleration Time 240.0 ms    MV A Velocity 1.21 m/s    MV E Velocity 0.99 m/s    MV Mean Gradient 2 mmHg    MV VTI 27.3 cm    MV Mean Velocity 0.7 m/s    MV Area by VTI 2.5 cm2    CT End Diastolic Max Velocity 1.1 m/s    Pulmonary Artery EDP 5 mmHg    PV Peak Gradient 6 mmHg    PV AT 74.0 ms    PV Max Velocity 1.2 m/s    PV Peak Gradient 6 mmHg    RV Basal Dimension 2.7 cm    RV Longitudinal Dimension 5.5 cm    RV Mid Dimension 1.8 cm    TAPSE 2.5 >=1.7 cm    Fractional Shortening 2D

## 2025-04-10 NOTE — CARE COORDINATION
Pt transferred from the ICU/CCU. Was admitted on 4/8/25 for L pleural effusion. Pt s/p thora today. GS consulted for consideration of surgical biopsy of a R supraclavicular lymph node for suspected lymphoma. On 4L supplemental. Previous CM completed CMA:  Pt lives with spouse. Mostly independent but does need some assist. Rollator for ambulation and daughter recently bought transport w/c. PCP and insurance confirmed. Current with [facility]. States they have not seen Palliative care. Daughter would like to talk with Palliative and referral sent to [facility] and gilmar Cross notified and following. Referral sent through cc link/palliative care only presently. [facility] referral sent for them to follow and gilmar Alvarenga aware. Family ok with resumption at d/c unless pt needs more. Aware CM to follow for JILL needs/POC.   Will continue to monitor.

## 2025-04-10 NOTE — PROGRESS NOTES
[facility] Critical Care Note:: 4/10/2025  Violet Siddiqui  Admission Date: 4/8/2025     Length of Stay: 2 days    Background: 85 y.o. female with h/o L CEA, SIADH, 57pkyr smoking history (quit 2013), mediastinal and hilar lymphadenopathy, L pleural effusion (exudate, no malignant cells but abundant lymphocytes- FLOW CYTOMETRY RECOMMENDED).    Notable PMH:  has a past medical history of Carotid artery stenosis, Depression, Hearing loss, Hyperlipidemia, Hypertension, Hypothyroidism, Insomnia, Irritable bowel syndrome, Osteoarthritis, Osteoporosis, Peripheral artery insufficiency, Peripheral vascular disease, Sciatica, and TIA (transient ischemic attack).    24 Hour events:   Doing well this morning.  PET reviewed and demonstrates multiple FDG-AVID lymph nodes in mediastinum, hilum, L pleural uptake and also R supraclavicular lymphadenopathy.    She is on 4lpm and eating this morning.  Her HR is in 80s.  Heparin drip held this morning for possible thoracentesis.      Review of Systems: Comprehensive ROS negative except in HPI    Lines:    External Urinary Catheter (Active)      Drips: current dose (range)  Dose (units/hr) Heparin: 0 Units/hr  Dose (mg/min) Amiodarone: 1 mg/min     Pertinent Exam:         Blood pressure (!) 112/57, pulse 83, temperature 98.2 °F (36.8 °C), temperature source Oral, resp. rate (!) 36, height 1.524 m (5'), weight 68 kg (150 lb), SpO2 98%.   Intake/Output Summary (Last 24 hours) at 4/10/2025 0859  Last data filed at 4/10/2025 0812  Gross per 24 hour   Intake 250 ml   Output 100 ml   Net 150 ml     Constitutional: pleasant on 4lpm  EENMT:  Sclera clear, pupils equal, oral mucosa moist  Respiratory: CTA with decrease L base  Cardiovascular: RRR  Gastrointestinal:  soft with no tenderness; positive bowel sounds present  Musculoskeletal:  warm with no cyanosis  Skin:  no jaundice or ecchymosis  Neurologic: intact      CXR:   RUL abn either atelectasis, fluid or mass.  L

## 2025-04-10 NOTE — PLAN OF CARE
Problem: Safety - Adult  Goal: Free from fall injury  Outcome: Progressing  Flowsheets (Taken 4/9/2025 1930)  Free From Fall Injury: Instruct family/caregiver on patient safety     Problem: Skin/Tissue Integrity  Goal: Skin integrity remains intact  Description: 1.  Monitor for areas of redness and/or skin breakdown  2.  Assess vascular access sites hourly  3.  Every 4-6 hours minimum:  Change oxygen saturation probe site  4.  Every 4-6 hours:  If on nasal continuous positive airway pressure, respiratory therapy assess nares and determine need for appliance change or resting period  Outcome: Progressing  Flowsheets (Taken 4/9/2025 1930)  Skin Integrity Remains Intact: Monitor for areas of redness and/or skin breakdown     Problem: Chronic Conditions and Co-morbidities  Goal: Patient's chronic conditions and co-morbidity symptoms are monitored and maintained or improved  Outcome: Progressing  Flowsheets (Taken 4/9/2025 1930)  Care Plan - Patient's Chronic Conditions and Co-Morbidity Symptoms are Monitored and Maintained or Improved: Monitor and assess patient's chronic conditions and comorbid symptoms for stability, deterioration, or improvement     Problem: Discharge Planning  Goal: Discharge to home or other facility with appropriate resources  Outcome: Progressing  Flowsheets (Taken 4/9/2025 1930)  Discharge to home or other facility with appropriate resources: Identify barriers to discharge with patient and caregiver

## 2025-04-10 NOTE — INTERDISCIPLINARY ROUNDS
Multi-D Rounds/Checklist (leapfrog):  Lines: can any be removed?: None    External Urinary Catheter (Active)      DVT Prophylaxis: Ordered  Vent: N/A  Nutrition Ordered/appropriate: Ordered  Can antibiotics or other drugs be stopped? N/A Yes/No  MRSA swab:   Inpat Anti-Infectives (From admission, onward)      None          Consults needed: None  A: Is pain control adequate? (has PRNs? Stop drip?) Yes  B: Sedation break and SBT? N/A  C: Is sedation choice appropriate? N/A  D: Delirium/CAM-ICU? No  E: Mobility goals/appropriateness? Yes  F: Family update and plan? spouse is surrogate decision maker and is being updated daily by primary attending and nursing staff.    Marcia Mccann, APRN - CNP

## 2025-04-10 NOTE — PROGRESS NOTES
Occupational Therapy Note:  Therapist is discharging patient from OT at this time due to decline in medical status and transfer to ICU. Please reconsult OT when MD deems patient appropriate for continued services.   Thank you.  Fabio Parker, OTR/L

## 2025-04-10 NOTE — PROGRESS NOTES
4/10/2025 1:27 PM    Admit Date: 4/8/2025    Admit Diagnosis: Pleural effusion [J90]  Hypoxia [R09.02]  Atrial fibrillation, unspecified type (HCC) [I48.91]      Subjective:   No cp- less sob      Objective:    /67   Pulse (!) 120   Temp 97.2 °F (36.2 °C) (Axillary)   Resp 28   Ht 1.524 m (5')   Wt 68 kg (150 lb)   SpO2 96%   BMI 29.29 kg/m²     Physical Exam:  General-Well Developed, Well Nourished, No Acute Distress, Alert & Oriented x 3, appropriate mood.  Neck- supple, no JVD  CV- irregular rate and rhythm no MRG  Lung- clear bilaterally  Abd- soft, nontender, nondistended  Ext- no edema bilaterally.  Skin- warm and dry        Data Review:   Recent Labs     04/09/25  1150 04/10/25  0538   NA  --  136   K  --  3.5   BUN  --  37*   WBC  --  15.3*   HGB  --  12.8   HCT  --  39.1   PLT  --  386   INR 1.0  --        Assessment/Plan:     Active Hospital Problems    Atrial fibrillation with rapid ventricular response (HCC)-she had converted to normal sinus rhythm yesterday but flipped back into A-fib this morning.  Will continue IV amiodarone.  Hopefully as her pulmonary status improves her A-fib will be easier to control.  Rate is not as fast as yesterday though.  On heparin for anticoagulation      Acute pulmonary edema (HCC)      Acute respiratory failure      Pleural effusion

## 2025-04-10 NOTE — PROGRESS NOTES
Therapy Note:  Therapist participated in ICU/CCU IDT rounds and believe patient is currently functioning below baseline functional mobility/ADL performance.  Patient could benefit from skilled therapy services when MD deems medically appropriate.  Thank you,  KERRY HUBBARD, PT

## 2025-04-10 NOTE — PROGRESS NOTES
TRANSFER - IN REPORT:    Verbal report received from KAYLA Pérez on Violet Siddiqui  being received from CCU for routine progression of patient care      Report consisted of patient's Situation, Background, Assessment and   Recommendations(SBAR).     Information from the following report(s) Nurse Handoff Report, Adult Overview, Intake/Output, and Cardiac Rhythm SR/Afib  was reviewed with the receiving nurse.    Opportunity for questions and clarification was provided.      Assessment completed upon patient's arrival to unit and care assumed.

## 2025-04-10 NOTE — PROGRESS NOTES
Transported patient to room 403 in bed. On remote telemetry and oxygen at 4 liters NC. Having some nausea has been medicated with Zofran. Tylenol given for HA.

## 2025-04-10 NOTE — PROGRESS NOTES
Physical Therapy Note:    Therapist is discontinuing acute PT services secondary to transfer to the CCU. Please re-consult acute PT services when medically appropriate. Thank you,    Sophie Crowley, PT, DPT

## 2025-04-10 NOTE — CARE COORDINATION
No transitions of care outreach at this time as patient was readmitted to D on 4/8/25 for Pleural effusion. Current program closed as patient is likely to be reassigned for paola pending discharge disposition.

## 2025-04-10 NOTE — CARE COORDINATION
Case Management Assessment  Initial Evaluation    Date/Time of Evaluation: 4/10/2025 1:22 PM  Assessment Completed by: Vonda Wild RN    If patient is discharged prior to next notation, then this note serves as note for discharge by case management.    Patient Name: Violet Siddiqui                   YOB: 1940  Diagnosis: Pleural effusion [J90]  Hypoxia [R09.02]  Atrial fibrillation, unspecified type (HCC) [I48.91]                   Date / Time: 4/8/2025 11:10 AM    Patient Admission Status: Inpatient   Readmission Risk (Low < 19, Mod (19-27), High > 27): Readmission Risk Score: 20.5    Current PCP: Zuleika Medrano MD  PCP verified by CM? Yes    Chart Reviewed: Yes      History Provided by: Patient, Child/Family  Patient Orientation: Alert and Oriented    Patient Cognition: Alert    Hospitalization in the last 30 days (Readmission):  Yes    If yes, Readmission Assessment in  Navigator will be completed.    Advance Directives:      Code Status: Limited   Patient's Primary Decision Maker is: Named in Scanned ACP Document    Primary Decision Maker: ChenKamaljit ELTON III - Spouse - [phone]    Secondary Decision Maker: Melanie Sandra - Child - [phone]    Secondary Decision Maker: Dorian Garzon - Child - [phone]    Discharge Planning:    Patient lives with: (P) Spouse/Significant Other Type of Home: (P) Other (Comment) (Pending)  Primary Care Giver: Self  Patient Support Systems include: Spouse/Significant Other, Children, Family Members   Current Financial resources: (P) Medicare, Other (Comment) ([facility])  Current community resources: (P) ECF/Home Care, Other (Comment) ([facility]  Current services prior to admission: (P) Durable Medical Equipment            Current DME: (P) Cane, Wheelchair, Walker (Rollator, transport w/c)            Type of Home Care services:       ADLS  Prior functional level: (P) Assistance with the following:  Current functional level: (P) Assistance  with the following:    PT AM-PAC:   /24  OT AM-PAC:   /24    Family can provide assistance at DC: (P) Yes  Would you like Case Management to discuss the discharge plan with any other family members/significant others, and if so, who? (P) Yes  Plans to Return to Present Housing: (P) Unknown at present  Other Identified Issues/Barriers to RETURNING to current housing: Pending  Potential Assistance needed at discharge: (P) Home Care, Other (Comment)            Potential DME:  Pending  Patient expects to discharge to:  Pending  Plan for transportation at discharge: (P) Family    Financial    Payor: MEDICARE / Plan: MEDICARE PART A AND B / Product Type: *No Product type* /     Does insurance require precert for SNF: No    Potential assistance Purchasing Medications: (P) No  Meds-to-Beds request: No      [id number] - [place], [place] - [address] - P [phone] - F [phone]  [address]  [facility] [address]  Phone: [phone] Fax: [phone]      Notes:    Factors facilitating achievement of predicted outcomes: Family support, Cooperative, and Pleasant    Barriers to discharge: Pending medical treatment    Additional Case Management Notes: Chart reviewed and pt seen in CCU s/p re-admission. Alert and oriented. Daughter at bedside. They confirm demographics/screen. Pt lives with spouse. Mostly independent but does need some assist. Rollator for ambulation and daughter recently bought transport w/c. PCP and insurance confirmed. Current with [facility]. States they have not seen Palliative care. Daughter would like to talk with Palliative and referral sent to [facility] and gilmar Cross notified and following. Referral sent through cc link/palliative care only presently. [facility] referral sent for them to follow and gilmar Alvarenga aware. Family ok with resumption at d/c unless pt needs more. Aware CM to follow for JILL needs/POC.         The Plan for Transition of Care is related to

## 2025-04-10 NOTE — CONSULTS
Consult Note            Date:4/10/2025        Patient Name:Violet Siddiqui     YOB: 1940     Age:85 y.o.    Inpatient consult to GI  Consult performed by: Kimber Galindo APRN - CNP  Consult ordered by: Jaelyn Alvarez MD        Chief Complaint     Chief Complaint   Patient presents with   • Shortness of Breath     Upon exertion. X multiple months. 88% RA        History Obtained From   patient, family member - daughter    History of Present Illness   Violet Siddiqui is an 84 yo female who presented to the ED with shortness of breath. She was recently discharged on 4/2 after having a CT scan which found a mass on lungs and then having a thoracentesis. PMH includes left carotid endarterectomy, SIADH, smoker, depression. GI was consulted for CT scan showing sigmoid thickening on Ct and visualization recommended. Patient is currently being worked up for lymphoma and had PET scan that showed associated PET uptake in sigmoid colon with wall thickening. She denies any nausea, vomiting, diarrhea. She has occasionally LUQ abdominal pain. She has been constipated recently but typically has to use medications to have a BM. She denies any black stools and states sometimes there is blood when she wipes on the tissue paper. She denies alcohol or tobacco use. Her mother did have colon cancer but she denies any family history of IBD. She denies any recent unintentional weight loss but has had loss of appetite. She did have a colonoscopy in 2013 but the MD stated there was a very tacky area in the sigmoid that neither the adult or pediatric scope could pass as well as numerous diverticulosis and 2 polyps. The patient's daughter stated they told her she has a kink in her colon and they are unable to do colonoscopies. She has had 2 CT colon's since that colonoscopy both have showed sigmoid wall thickness.     Medications: Heparin    Imaging: PET CT SKULL BASE TO MID THIGH  Result Date: 4/10/2025  1.  effusion. 3. Small partially loculated right pleural effusion. 4. Inflammatory change in the sigmoid colon with associated PET uptake. Most likely acute diverticulitis, malignancy not excluded. Electronically signed by CHARLES GARCIA    XR CHEST PORTABLE  Result Date: 4/10/2025  Findings/impression: Accounting for the differences in technique likely no significant interval change compared to the prior examination 1 day earlier. Electronically signed by Haseeb Gonzalez    XR CHEST PORTABLE  Result Date: 2025  Findings/impression: Medium sized left and small right pleural effusions again demonstrated with associated atelectasis left greater than right. Stable appearance of the cardiomediastinal silhouette. Mild prominence of central pulmonary vasculature. Senescent changes of the thoracic aorta, shoulders and spine. For more specific findings please see recent CT chest. Electronically signed by Haseeb Gonzalez      Assessment      [facility] Problems           Last Modified POA    * (Principal) Pleural effusion 2025 Yes    Atrial fibrillation with rapid ventricular response (HCC) 2025 Yes    Acute pulmonary edema (HCC) 2025 Yes    Acute respiratory failure 2025 Yes       Plan   1. Sigmoid wall thickenin yo female who is being worked up for lymphoma was noted on her PET scan to having sigmoid wall thickening with PET uptake. Colonoscopy in  showed \"a kink\" in her sigmoid colon and she has had two CT colon afterwards that have showed the sigmoid wall thickening. Recent CT scan recommended direct visualization which could be done outpatient. Will discuss with MD.     Electronically signed by OTTO Sanon CNP on 4/10/25 at 10:41 AM EDT

## 2025-04-10 NOTE — ACP (ADVANCE CARE PLANNING)
Advance Care Planning     Advance Care Planning Activator (Inpatient)  Conversation Note      Date of ACP Conversation: 4/10/2025     ACP Activator: Vonda Wild, RN    {When Decision Maker makes decisions on behalf of the incapacitated patient: Decision Maker is asked to consider and make decisions based on patient values, known preferences, or best interests.     Health Care Decision Maker: GISELA/NOBLE on file. Agents listed below.     Current Designated Health Care Decision Maker:     Primary Decision Maker: Kamaljit Siddiqui III - Spouse - [phone]    Secondary Decision Maker: Melanie Sandra - Child - [phone]    Secondary Decision Maker: Dorian Garzon - Child - [phone]  Click here to complete Healthcare Decision Makers including section of the Healthcare Decision Maker Relationship (ie \"Primary\")  Today we documented Decision Maker(s) consistent with ACP documents on file.    Care Preferences  Limited code per MD orders

## 2025-04-10 NOTE — OP NOTE
Operative Note      Patient: Violet Siddiqui  YOB: 1940  MRN: [id number]    Date of Procedure: 4/10/2025    Pre-Op Diagnosis Codes:      * Pleural effusion [J90]    Post-Op Diagnosis: Same       Procedure(s):  THORACENTESIS ULTRASOUND    Surgeon(s):  Tank Koroma MD    Assistant:   * No surgical staff found *    Anesthesia: Local    Estimated Blood Loss (mL): Minimal    Complications: None    Specimens:   ID Type Source Tests Collected by Time Destination   1 : L Pleural fluid #1 Body Fluid Thoracentesis CULTURE, BODY FLUID (WITH GRAM STAIN) Tank Koroma MD 4/10/2025 1410    2 : L Pleural fluid #2 Body Fluid Thoracentesis GLUCOSE, BODY FLUID, LACTATE DEHYDROGENASE, BODY FLUID, PROTEIN, BODY FLUID, BODY FLUID CELL COUNT, CHOLESTEROL, BODY FLUID Tank Koroma MD 4/10/2025 1410    A : L Pleural Fluid #3 Body Fluid Thoracentesis CYTOLOGY, NON-GYN, FLOW CYTOMETRY/HEMATOPATHOLOGY MISC Tank Koroma MD 4/10/2025 1410        Implants:  * No implants in log *      Drains:   External Urinary Catheter (Active)   Site Assessment Clean,dry & intact 04/10/25 0730   Placement Repositioned 04/10/25 0300   Securement Method Securing device (Describe) 04/10/25 0300   Catheter Care Catheter/Wick replaced 04/10/25 0300   Perineal Care Yes 04/10/25 0300   Suction 40 mmgHg continuous 04/10/25 0300   Urine Color Shavonne 04/10/25 0730   Urine Appearance Clear 04/10/25 0300   Output (mL) 100 mL 04/09/25 1930         Detailed Description of Procedure:   PROCEDURE:    DIAGNOSTIC/THERAPEUTIC THORACENTESIS.        PRE-OP DIAGNOSIS:    L PLEURAL EFFUSION    POST-OP DIAGNOSIS:    L PLEURAL EFFUSION    ASSISTANT:    none    ANESTHESIA:    LOCAL ANESTHESIA WITH 1% LIDOCAINE 10 CC TOTAL.      CHEST ULTRASOUND FINDINGS:    A Turbo-M, Sonosite ultrasound with a 5-16 mHz probe was used to image the chest and localize the pleural effusion on the Left/and/Right chest.    A moderate anechoic space was seen on the

## 2025-04-10 NOTE — CONSULTS
H&P/Consult Note/Progress Note/Office Note:   Voilet Siddiqui  MRN: [id number]  :1940  Age:85 y.o.    HPI: Violet Siddiqui is a 85 y.o. female who we are asked by Pulmonary/Critical Care to see for consideration of surgical biopsy of a R Supraclavicular Lymph node for suspected Lymphoma. The patient has a PMHx of Left Carotid stenosis >70%, DM2, CKD, HTN, SIADH, Thyroid disease and is a former smoker 60 pack year history quit . She presented with Shortness of Breath to the ED. She was admitted on 2025.     Cardiology consulted: Afib RVR (likely new onset) she is on a heparin drip that was placed on hold today for Thoracentesis. Echo 25: EF 60-65% LV normal function. Mild aortic sclerosis, mild mitral and tricuspid valve regurgitation.     CT Chest 3/31/25 showed a Large bulky mediastinal and left hilar lymphadenopathy concerning for malignancy/metastatic disease. There is occlusion of the left lower lobe  bronchus with compressive atelectasis/consolidation of the left lower lobe. Images reviewed by Dr Pérez.    CT A/P 3/31/25:  Long segment of wall thickening involving the sigmoid colon with trace pericolonic stranding which may relate to a colitis or acute diverticulitis. No extraluminal air or abscess is seen. Images have been reviewed by Dr Pérez. GI has been consulted - OP colonoscopy is planned in the next 1-2 weeks     PET CT 4/10/25: Hypermetabolic left hilar mass with extensive mediastinal and supraclavicular adenopathy. Differential includes lymphoma and small cell lung cancer. 2. Complete atelectasis of the left lower lobe and moderate size left pleural effusion. 3. Small partially loculated right pleural effusion. 4. Inflammatory change in the sigmoid colon with associated PET uptake. Most likely acute diverticulitis, malignancy not excluded. Images reviewed by Dr Pérez.     She was seen by Pulmonary 25 for Left pleural effusion with Thoracentesis yielding 300ml.  contrast was used for better evaluation of solid organs  and vascular structures.  Radiation dose reduction techniques were used for this  study.  All CT scans performed at this facility use one or all of the following:  Automated exposure control, adjustment of the mA and/or kVp according to  patient's size, iterative reconstruction.    COMPARISON: Chest x-ray from 1/21/2025.    FINDINGS:  Cardiac size is within normal limits. Thoracic aorta demonstrates a normal  caliber with extensive atherosclerotic calcifications. Coronary artery  calcifications are identified. There is a trace pericardial effusion. There is a  small to moderate left-sided pleural effusion with adjacent left lower lobe  compressive atelectasis/consolidation. There is a small right-sided pleural  effusion in the right upper hemithorax and along the right major fissure. There  is large bulky mediastinal lymphadenopathy which extends to the left hilar  region resulting in occlusion of the left lower lobe bronchus. No pneumothorax  is seen.    Evaluation of the osseous structures demonstrates degenerative changes greatest  at T9-T10.    Impression  1. Large bulky mediastinal and left hilar lymphadenopathy concerning for  malignancy/metastatic disease. There is occlusion of the left lower lobe  bronchus with compressive atelectasis/consolidation of the left lower lobe.  2. Moderate left and small right pleural effusions as above.  3. Trace pericardial effusion.    Electronically signed by Vinay Butler    US Result (most recent):  No results found for this or any previous visit from the past 3650 days.        Admission date (for inpatients): 4/8/2025   * Day of Surgery *  Procedure(s):  THORACENTESIS ULTRASOUND    ASSESSMENT/PLAN: 85 yr old female admitted with recurrent Shortness of breath/SÁNCHEZ with recurrent Left pleural effusion s/p thoracentesis (4/1 cytology neg for malignancy; 4/10/25 cytology pending); CT Chest with bulky mediastinal

## 2025-04-10 NOTE — PROGRESS NOTES
Pt states she does not wear cpap at home and does not wish to wear it here. No complications noted at this time.

## 2025-04-10 NOTE — PROGRESS NOTES
TRANSFER - OUT REPORT:    Verbal report given to Nella GARZA on Violet Siddiqui  being transferred to [facility] for routine progression of patient care       Report consisted of patient's Situation, Background, Assessment and   Recommendations(SBAR).     Information from the following report(s) Nurse Handoff Report, ED Encounter Summary, Intake/Output, MAR, Recent Results, and Cardiac Rhythm SR/AFib  was reviewed with the receiving nurse.           Lines:   Peripheral IV 04/09/25 Right;Anterior Forearm (Active)   Site Assessment Clean, dry & intact 04/10/25 1130   Line Status Intermittent infusions 04/10/25 1130   Line Care Connections checked and tightened 04/10/25 0730   Phlebitis Assessment No symptoms 04/10/25 1130   Infiltration Assessment 0 04/10/25 1130   Alcohol Cap Used Yes 04/10/25 0730   Dressing Status Clean, dry & intact 04/10/25 1130   Dressing Type Transparent 04/10/25 1130       Peripheral IV 04/09/25 Left;Posterior Hand (Active)   Site Assessment Clean, dry & intact 04/10/25 1130   Phlebitis Assessment No symptoms 04/10/25 1130   Infiltration Assessment 0 04/10/25 1130   Dressing Status Clean, dry & intact 04/10/25 1130   Dressing Type Transparent 04/10/25 1130        Opportunity for questions and clarification was provided.      Patient transported with:  Monitor, O2 @ 4lpm, Patient-specific medications from Pharmacy, and Registered Nurse

## 2025-04-10 NOTE — FLOWSHEET NOTE
4 Eyes Skin Assessment     NAME:  Violet Siddiqui  YOB: 1940  MEDICAL RECORD NUMBER:  [id number]    The patient is being assessed for  Admission    I agree that at least one RN has performed a thorough Head to Toe Skin Assessment on the patient. ALL assessment sites listed below have been assessed.      Areas assessed by both nurses:    Head, Face, Ears, Shoulders, Back, Chest, Arms, Elbows, Hands, Sacrum. Buttock, Coccyx, Ischium, and Legs. Feet and Heels       04/10/25 6710   Skin Integumentary    Skin Color Ecchymosis (comment)   Skin Condition/Temp Warm;Dry;Fragile   Skin Integrity Ecchymosis;Scars (comment)   Location abd scar; scattered scars/bruising   Skin Integumentary (WDL) X       Does the Patient have a Wound? No noted wound(s)       Boom Prevention initiated by RN: No  Wound Care Orders initiated by RN: No    Pressure Injury (Stage 3,4, Unstageable, DTI, NWPT, and Complex wounds) if present, place Wound referral order by RN under : No    New Ostomies, if present place, Ostomy referral order under : No     Nurse 1 eSignature: Electronically signed by Nella Sandra RN on 4/10/25 at 6:20 PM EDT    **SHARE this note so that the co-signing nurse can place an eSignature**    Nurse 2 eSignature: Electronically signed by Estephanie Hart RN on 4/10/25 at 6:21 PM EDT

## 2025-04-10 NOTE — INTERVAL H&P NOTE
Update History & Physical    The patient's History and Physical of April 10, 2025 was reviewed with the patient and I examined the patient. There was no change. The surgical site was confirmed by the patient and me.     Plan: The risks, benefits, expected outcome, and alternative to the recommended procedure have been discussed with the patient. Patient understands and wants to proceed with the procedure.     Electronically signed by Tank Koroma MD on 4/10/2025 at 1:57 PM

## 2025-04-11 NOTE — PLAN OF CARE
Problem: Safety - Adult  Goal: Free from fall injury  4/10/2025 2208 by Natty Foreman RN  Outcome: Progressing  4/10/2025 0932 by Beto Chan RN  Outcome: Progressing  Flowsheets (Taken 4/10/2025 0730)  Free From Fall Injury: Instruct family/caregiver on patient safety     Problem: Skin/Tissue Integrity  Goal: Skin integrity remains intact  Description: 1.  Monitor for areas of redness and/or skin breakdown  2.  Assess vascular access sites hourly  3.  Every 4-6 hours minimum:  Change oxygen saturation probe site  4.  Every 4-6 hours:  If on nasal continuous positive airway pressure, respiratory therapy assess nares and determine need for appliance change or resting period  4/10/2025 2208 by Natty Foreman RN  Outcome: Progressing  4/10/2025 0932 by Beto Chan RN  Outcome: Progressing  Flowsheets (Taken 4/10/2025 0730)  Skin Integrity Remains Intact:   Monitor for areas of redness and/or skin breakdown   Assess vascular access sites hourly   Every 4-6 hours minimum:  Change oxygen saturation probe site   Every 4-6 hours:  If on nasal continuous positive airway pressure, assess nares and determine need for appliance change or resting period   Turn and reposition as indicated   Assess need for specialty bed   Positioning devices   Pressure redistribution bed/mattress (bed type)   Check visual cues for pain   Monitor skin under medical devices     Problem: Chronic Conditions and Co-morbidities  Goal: Patient's chronic conditions and co-morbidity symptoms are monitored and maintained or improved  4/10/2025 2208 by Natty Foreman RN  Outcome: Progressing  4/10/2025 0932 by Beto Chan RN  Outcome: Progressing  Flowsheets (Taken 4/10/2025 0730)  Care Plan - Patient's Chronic Conditions and Co-Morbidity Symptoms are Monitored and Maintained or Improved:   Monitor and assess patient's chronic conditions and comorbid symptoms for stability, deterioration, or improvement   Collaborate with

## 2025-04-11 NOTE — CARE COORDINATION
CM following. On 4L supplemental. Remains in AFib. GS consulted for biopsy. [facility] arranged ITM.

## 2025-04-11 NOTE — ICUWATCH
RRT Clinical Rounding Nurse Progress Report      SUBJECTIVE: Patient assessed secondary to transfer from critical care.      Vitals:    04/10/25 1952 04/10/25 2000 04/10/25 2100 04/10/25 2200   BP: 135/66 112/70 (!) 109/56 (!) 116/43   Pulse: 78 (!) 104 80 87   Resp: 18      Temp: 97.9 °F (36.6 °C)      TempSrc: Oral      SpO2:       Weight:       Height:            DETERIORATION INDEX SCORE: 39    ASSESSMENT:  Pt resting in bed at this time. Pt denies any pain or SOB. Pt remains on amio gtt. Respirations are even and unlabored on 4L NC. Bilateral breath sounds are diminished. Pt states her breathing is feeling better after her thoracentesis today. VSS. No acute distress noted. Primary RN to call with any concerns.     PLAN:  Will follow per RRT Clinical Rounding Program protocol.    Audrey Zuñiga RN  [facility]: [phone]  [facility]: [phone]

## 2025-04-11 NOTE — PROGRESS NOTES
[facility] CARDIOLOGY PROGRESS NOTE           4/11/2025 7:12 AM    Admit Date: 4/8/2025      Subjective:   Patient denies any chest pain or active dyspnea.  On 4 L of oxygen.  Review of telemetry shows intermittent atrial fibrillation.  At time of my exam she is in atrial fibrillation at 103 bpm.  Earlier this morning she was in sinus rhythm.  Remains on amiodarone at 1 mg/min and intravenous heparin.  Seen by surgery for potential lymph node biopsy given her abnormal CT and PET scan.  Patient had thoracentesis yesterday.  Currently on IV Lasix 40 mg every 12 hours    ROS:  Cardiovascular:  As noted above    Objective:      Vitals:    04/11/25 0417 04/11/25 0426 04/11/25 0500 04/11/25 0615   BP: 107/61 (!) 124/52 (!) 101/54 (!) 114/57   Pulse: 76 (!) 114 98 74   Resp: 18      Temp: 97.5 °F (36.4 °C)      TempSrc: Oral      SpO2: 100%      Weight: 67.4 kg (148 lb 11.2 oz)      Height:           Physical Exam:  General-elderly white female in no acute distress.  Neck- supple, no JVD  CV-irregular regular rhythm with mildly tachycardic rate.  Lung- clear bilaterally  Abd- soft, nontender, nondistended  Ext- no edema bilaterally.  Skin- warm and dry      Data Review:   Recent Labs     04/11/25  0628 04/10/25  0538 04/09/25  0608   WBC 13.4* 15.3* 13.2*   HGB 12.7 12.8 13.4   HCT 40.2 39.1 40.8   MCV 95.5 92.9 91.3    386 409       Recent Labs     04/11/25  0628 04/10/25  0538 04/09/25  0754      < > 143   K 3.5   < > 3.9   CL 95*   < > 101   CO2 32*   < > 31*   BUN 50*   < > 28*   CREATININE 0.84   < > 0.70   GLUCOSE 133*   < > 108*   CALCIUM 9.3   < > 8.8   BILITOT  --   --  0.4   ALKPHOS  --   --  125*   AST  --   --  56*   ALT  --   --  52*   LABGLOM 68   < > 85   GLOB  --   --  3.8*    < > = values in this interval not displayed.       No results for input(s): \"CKTOTAL\", \"CKMB\", \"CKMBINDEX\", \"DDIMER\", \"TROPONINI\" in the last 720 hours.    Echo (4/9/25):       Left Ventricle: Normal left  ventricular systolic function with a visually estimated EF of 60 - 65%. Left ventricle size is normal. Normal wall thickness. Normal wall motion. Abnormal diastolic function.    Aortic Valve: Mild sclerosis of the aortic valve cusps.    Mitral Valve: Mild regurgitation.    Tricuspid Valve: Mild regurgitation.    Pericardium: Trivial pericardial effusion present. No indication of cardiac tamponade.       Assessment/Plan:     Active Hospital Problems    Abnormal abdominal CT scan  Concerning for malignancy.  Awaiting lymph node biopsy      Atrial fibrillation with rapid ventricular response (HCC)  Intermittent atrial fibrillation.  Continue IV amiodarone today and IV heparin.        Acute respiratory failure  Multifactorial.  Improved after thoracentesis.  Continue diuresis with Lasix as long as blood pressure and renal function allow      *Pleural effusion  Status post thoracentesis.                Ej Newton MD

## 2025-04-11 NOTE — ICUWATCH
RRT Clinical Rounding Nurse Update    Vitals:    04/11/25 0026 04/11/25 0100 04/11/25 0206 04/11/25 0300   BP: (!) 121/52 (!) 108/45 (!) 105/59 (!) 103/54   Pulse: 75 74 75 75   Resp:       Temp:       TempSrc:       SpO2:       Weight:       Height:            DETERIORATION INDEX SCORE: 36    ASSESSMENT:  Previous outreach assessment was reviewed. There have been no significant changes since previous assessment.    PLAN:  Will follow per RRT Clinical Rounding Program protocol.    Audrey Zuñiga RN  [facility]: [phone]  [facility]: [phone]

## 2025-04-11 NOTE — PROGRESS NOTES
Daily Progress Note: 4/11/2025    Violet Siddiqui  Admission Date: 4/8/2025     Length of Stay: 3 days      Background: Patient is a 85 y.o.  female seen and evaluated at the request of Dr. Howard for recurrent pleural effusion.  She was seen by on 4/1 for left pleural effusion.  She has new concern for pulmonary malignancy, SÁNCHEZ and recurrent left pleural effusion with negative cytology. Her PMH includes left carotid artery stenosis, T2DM, HTN, CKD, hypothyroidism, SIADH, former tobacco abuse 60 total pack years- quit in 2014. She has not seen cardiology in the past. Denies any known COPD, asthma or inhalers.   Presented to the ED with SOB. CXR with recurrent left effusion. Her recent CT A/P with long segment of wall thickening involving the sigmoid colon with trace pericolonic stranding, no abscess or extraluminal air seen. CT Chest with bilateral pleural effusions and bulky mediastinal lymphadenopathy and hilar lymphadenopathy. Pleural fluid was without malignant cells and EBUS for further work up after PET scan was discussed. She had her PET scan on Monday. When seen in ED, HR in 150-160's, new onset atrial fibrillation with RVR. She is hypotensive with tachypnea. She is is on NC with sat 95%. Cardiology called to see. We were asked to see her for acute respiratory failure with a new onset a fib RVR with hypotension.  Her daughter confirms DNI but wants medications and CPR if needed. She lives with her , walks, and is not on oxygen at home.  Echocardiogram completed and EF 60-65%, abnormal diastolic function    Subjective:     GI saw her and planning o/p colonoscopy 1-2 weeks  Surgery consulted for biopsy  380 cc removed on left on 4/10, transudative  Remains in a fib. On lasix.  Looks visibly SOB but says she feels better than she has in days    Review of Systems  Constitutional: negative for fever, chills, sweats  Cardiovascular: negative for chest pain, palpitations, syncope,        ATTENDING ADDENDUM:    In this split/shared evaluation I performed reviewed the patients's H&P, available images, labs, cultures., discussed case in detail with NPP, performed a medically appropriate history and exam, counseled and educated the patient and/or family member, ordered and/or reviewed medications, tests or procedures, documented information in EMR, independently interpreted images, and coordinated care. which accounted for 30 minutes clinical time.     Impression:      85 y.o. female admitted 4/8/2025 for Pleural effusions/p thoracentesis ,but non diagnostic ,she had PET scan with hypermetabolic hilar mass and extensive mediastinal and supraclavicular adenopathy  Plans for excisional supraclavicular biopsy by surgery on Monday   On heparin gtt will need to be hold for biopsy       Belinda Deluca MD

## 2025-04-11 NOTE — PROGRESS NOTES
General Surgery consulted for consideration of surgical biopsy of a R Supraclavicular Lymph node for suspected Lymphoma. Surgical plan per Dr Pérez.         Kimberly Frommel, NP

## 2025-04-11 NOTE — PROGRESS NOTES
Hospitalist Progress Note   Admit Date:  2025 11:10 AM   Name:  Violet Siddiqui   Age:  85 y.o.  Sex:  female  :  1940   MRN:  [id number]   Room:  403/01    Presenting/Chief Complaint: Shortness of Breath (Upon exertion. X multiple months. 88% RA)     Reason(s) for Admission: Pleural effusion [J90]  Hypoxia [R09.02]  Atrial fibrillation, unspecified type (HCC) [I48.91]     Hospital Course:   Please refer to the admission H&P for details of presentation.      In summary, Violet Siddiqui is a 85 y.o. female with medical history significant for left carotid endarterectomy, SIADH, hypothyroidism, hypertension, peripheral artery disease status post CEA who presented with SOB.   Patient was discharged from the hospital  where CT scan revealed mass on lungs. Large left pleural effusion was drained during that hospital stay.     Patient has PET  on  and noticed to have wheezing with progressively worsening shortness of breath after that scan.  On presentation, she was saturating at 88% on RA.    Patient underwent thoracentesis with pulmonary on 4/10 with removal of 380 cc from the left pleural effusion.  Surgery has been consulted for biopsy of lymphadenopathy.       Subjective/24 hr Events (25) :  Patient is seen and examined at bedside.  No acute events reported overnight by nursing staff.  Patient family at bedside.  Patient is currently on 4L O2 NC and reports that breathing has improved since thoracentesis.  Patient denies fever, chills, chest pains, n/v, abdominal pain.    Assessment & Plan:   Pleural effusion  Acute hypoxic respiratory failure  S/p thoracentesis on 4/10 with removal of 380cc of left pleural effusion  - appreciate pulmonary's recommendation.  Pending cytology from thoracentesis.  - Continue O2 supplementation wean as tolerated  - Continue IV Lasix twice daily      Atrial fibrillation, new onset  Patient continues to be intermittent atrial fibrillation.  Was in     MV A Velocity 1.21 m/s    MV E Velocity 0.99 m/s    MV Mean Gradient 2 mmHg    MV VTI 27.3 cm    MV Mean Velocity 0.7 m/s    MV Area by VTI 2.5 cm2    MI End Diastolic Max Velocity 1.1 m/s    Pulmonary Artery EDP 5 mmHg    PV Peak Gradient 6 mmHg    PV AT 74.0 ms    PV Max Velocity 1.2 m/s    PV Peak Gradient 6 mmHg    RV Basal Dimension 2.7 cm    RV Longitudinal Dimension 5.5 cm    RV Mid Dimension 1.8 cm    TAPSE 2.5 >=1.7 cm    Fractional Shortening 2D 33 28 - 44 %    LV ESV Index A4C 13 mL/m2    LV EDV Index A4C 39 mL/m2    LV ESV Index A2C 13 mL/m2    LV EDV Index A2C 29 mL/m2    LVIDd Index 2.73 cm/m2    LVIDs Index 1.82 cm/m2    LV RWT Ratio 0.22     LV Mass 2D 70.9 67 - 162 g    LV Mass 2D Index 43.0 43 - 95 g/m2    MV E/A 0.82     E/E' Ratio (Averaged) 14.34     E/E' Lateral 14.22     E/E' Septal 14.45     LA Volume Index BP 15 (A) 16 - 34 ml/m2    LVOT Stroke Volume Index 41.9 mL/m2    LA Volume Index MOD A2C 14 (A) 16 - 34 ml/m2    LA Volume Index MOD A4C 15 (A) 16 - 34 ml/m2    LA Size Index 1.52 cm/m2    LA/AO Root Ratio 0.96     Ao Root Index 1.58 cm/m2    Ascending Aorta Index 1.52 cm/m2    AV Velocity Ratio 0.73     LVOT:AV VTI Index 0.75     HUSEYIN/BSA VTI 1.5 cm2/m2    HUSEYIN/BSA Peak Velocity 1.3 cm2/m2    MV:LVOT VTI Index 1.24     Body Surface Area 1.7 m2    EF 3D 63 %   Anti-Xa, Unfractionated Heparin    Collection Time: 04/09/25  5:36 PM   Result Value Ref Range    Anti-XA Unfrac Heparin 0.50 0.3 - 0.7 IU/mL   Anti-XA, Heparin    Collection Time: 04/09/25  9:58 PM   Result Value Ref Range    Anti-XA Unfrac Heparin 0.51 0.3 - 0.7 IU/mL   Anti-Xa, Unfractionated Heparin    Collection Time: 04/09/25 11:28 PM   Result Value Ref Range    Anti-XA Unfrac Heparin 0.53 0.3 - 0.7 IU/mL   Anti-Xa, Unfractionated Heparin    Collection Time: 04/10/25  5:38 AM   Result Value Ref Range    Anti-XA Unfrac Heparin 0.60 0.3 - 0.7 IU/mL   Basic Metabolic Panel w/ Reflex to MG    Collection Time: 04/10/25  5:38 AM

## 2025-04-12 PROBLEM — K63.9 SIGMOID THICKENING: Status: ACTIVE | Noted: 2025-01-01

## 2025-04-12 NOTE — PROGRESS NOTES
Violet Siddiqui  Admission Date: 4/8/2025         Daily Progress Note: 4/12/2025    Background:   85 y.o.  female seen and evaluated for recurrent pleural effusion.  She was seen by on 4/1 for left pleural effusion.  She has new concern for pulmonary malignancy, SÁNCHEZ and recurrent left pleural effusion with negative cytology. Her PMH includes left carotid artery stenosis, T2DM, HTN, CKD, hypothyroidism, SIADH, former tobacco abuse 60 total pack years- quit in 2014. She has not seen cardiology in the past. Denies pulmonary history.  Presented to the ED with SOB. CXR with recurrent left effusion. Her recent CT A/P with long segment of wall thickening involving the sigmoid colon with trace pericolonic stranding, no abscess or extraluminal air seen. CT Chest with bilateral pleural effusions and bulky mediastinal lymphadenopathy and hilar lymphadenopathy. PET with bilateral hypermetabolic supraclavicular adenopathy, poorly defined hypermetabolic left hilar mass with compression  of the left lower lobe bronchus and complete left lower lobe atelectasis. There  is also extensive hypermetabolic mediastinal adenopathy largest node is right  paratracheal, 4.9 x 3.7 cm and 6.9 SUV max.     When seen in ED, HR in 150-160's, new onset atrial fibrillation with RVR. She is hypotensive with tachypnea. She is is on NC with sat 95%. Cardiology called to see. We were asked to see her for acute respiratory failure with a new onset a fib RVR with hypotension.      Her daughter confirms DNI but wants medications and CPR if needed. She lives with her , walks, and is not on oxygen at home.     Subjective:     Overall CXR stable.  Only 2L NC, sat 92-94%.      Current Facility-Administered Medications   Medication Dose Route Frequency    heparin (porcine) injection 4,000 Units  4,000 Units IntraVENous PRN    heparin (porcine) injection 2,000 Units  2,000 Units IntraVENous PRN    heparin 25,000 units in  dextrose 5% 250 mL (premix) infusion  5-30 Units/kg/hr IntraVENous Continuous    melatonin tablet 6 mg  6 mg Oral Nightly PRN    amiodarone (CORDARONE) 450 mg in dextrose 5 % 250 mL infusion ([id number])  1 mg/min IntraVENous Continuous    ALPRAZolam (XANAX) tablet 0.25 mg  0.25 mg Oral Daily PRN    aspirin EC tablet 81 mg  81 mg Oral BID    atorvastatin (LIPITOR) tablet 10 mg  10 mg Oral QPM    coenzyme Q10 capsule 100 mg  (Patient Supplied)  100 mg Oral Lunch    levothyroxine (SYNTHROID) tablet 88 mcg  88 mcg Oral Daily    [Held by provider] lisinopril (PRINIVIL;ZESTRIL) tablet 20 mg  20 mg Oral BID    meclizine (ANTIVERT) tablet 25 mg  25 mg Oral TID PRN    sodium chloride tablet 1 g  1 g Oral TID WC    traZODone (DESYREL) tablet 50 mg  50 mg Oral Nightly PRN    urea (URE-NA) packet 15 g  15 g Oral BID    sodium chloride flush 0.9 % injection 5-40 mL  5-40 mL IntraVENous 2 times per day    sodium chloride flush 0.9 % injection 5-40 mL  5-40 mL IntraVENous PRN    0.9 % sodium chloride infusion   IntraVENous PRN    potassium chloride (KLOR-CON M) extended release tablet 40 mEq  40 mEq Oral PRN    Or    potassium bicarb-citric acid (EFFER-K) effervescent tablet 40 mEq  40 mEq Oral PRN    Or    potassium chloride 10 mEq/100 mL IVPB (Peripheral Line)  10 mEq IntraVENous PRN    magnesium sulfate 2000 mg in 50 mL IVPB premix  2,000 mg IntraVENous PRN    ondansetron (ZOFRAN-ODT) disintegrating tablet 4 mg  4 mg Oral Q8H PRN    Or    ondansetron (ZOFRAN) injection 4 mg  4 mg IntraVENous Q6H PRN    polyethylene glycol (GLYCOLAX) packet 17 g  17 g Oral Daily PRN    acetaminophen (TYLENOL) tablet 650 mg  650 mg Oral Q6H PRN    Or    acetaminophen (TYLENOL) suppository 650 mg  650 mg Rectal Q6H PRN     Review of Systems: Comprehensive ROS negative except in HPI  Objective:   Blood pressure 122/66, pulse 82, temperature 97 °F (36.1 °C), temperature source Temporal, resp. rate 18, height 1.524 m (5'), weight 68 kg (149 lb 14.4

## 2025-04-12 NOTE — ICUWATCH
RRT Clinical Rounding Nurse Update    Vitals:    04/11/25 0615 04/11/25 0724 04/11/25 1118 04/11/25 1958   BP: (!) 114/57 107/72  117/61   Pulse: 74 (!) 106 74 75   Resp:  12 16 18   Temp:  97.3 °F (36.3 °C) 97.5 °F (36.4 °C) 98.1 °F (36.7 °C)   TempSrc:  Oral  Oral   SpO2:  98% 96%    Weight:       Height:            DETERIORATION INDEX SCORE: 33    ASSESSMENT:  Previous outreach assessment was reviewed. There have been no significant changes since previous assessment.    PLAN:  Will discharge from RRT Clinical Rounding Program per protocol. Please call if needed.    Pradeep Canas RN  [facility]: [phone]  [facility]: [phone]

## 2025-04-12 NOTE — PROGRESS NOTES
[facility] CARDIOLOGY PROGRESS NOTE           4/12/2025 8:25 AM    Admit Date: 4/8/2025      Subjective:   Patient remains on 4 L of oxygen.  Denies any active dyspnea or chest pain.  Remains on intravenous amiodarone and heparin.  In sinus rhythm this morning with PACs.  Currently on IV Lasix 40 mg every 12 hours.  Labs suggest that she is prerenal.  BUN increased from 28 on admission to 62.    ROS:  Cardiovascular:  As noted above    Objective:      Vitals:    04/11/25 1958 04/11/25 2354 04/12/25 0418 04/12/25 0739   BP: 117/61 (!) 113/44 (!) 120/49 122/66   Pulse: 75 76 74 82   Resp: 18 18 18 18   Temp: 98.1 °F (36.7 °C) 97.3 °F (36.3 °C) 97.7 °F (36.5 °C) 97 °F (36.1 °C)   TempSrc: Oral Oral Oral Temporal   SpO2:  98% 97% 100%   Weight:   68 kg (149 lb 14.4 oz)    Height:           Physical Exam:  General-elderly white female in no acute distress.  Neck- supple, no JVD  CV-irregular regular rhythm with PACs  Lung- clear bilaterally  Abd- soft, nontender, nondistended  Ext- no edema bilaterally.  Skin- warm and dry      Data Review:   Recent Labs     04/12/25  0732 04/11/25  0628 04/10/25  0538   WBC 16.3* 13.4* 15.3*   HGB 12.6 12.7 12.8   HCT 38.5 40.2 39.1   MCV 93.9 95.5 92.9    335 386       Recent Labs     04/12/25  0732 04/10/25  0538 04/09/25  0754   *   < > 143   K 3.7   < > 3.9   CL 93*   < > 101   CO2 32*   < > 31*   BUN 62*   < > 28*   CREATININE 0.90   < > 0.70   GLUCOSE 128*   < > 108*   CALCIUM 9.7   < > 8.8   BILITOT  --   --  0.4   ALKPHOS  --   --  125*   AST  --   --  56*   ALT  --   --  52*   LABGLOM 63   < > 85   GLOB  --   --  3.8*    < > = values in this interval not displayed.       No results for input(s): \"CKTOTAL\", \"CKMB\", \"CKMBINDEX\", \"DDIMER\", \"TROPONINI\" in the last 720 hours.    Echo (4/9/25):       Left Ventricle: Normal left ventricular systolic function with a visually estimated EF of 60 - 65%. Left ventricle size is normal. Normal wall thickness.  Normal wall motion. Abnormal diastolic function.    Aortic Valve: Mild sclerosis of the aortic valve cusps.    Mitral Valve: Mild regurgitation.    Tricuspid Valve: Mild regurgitation.    Pericardium: Trivial pericardial effusion present. No indication of cardiac tamponade.       Assessment/Plan:     Active Hospital Problems    Abnormal abdominal CT scan  Concerning for malignancy.  Awaiting lymph node biopsy.  Unclear timing      Atrial fibrillation with rapid ventricular response (HCC)  Continue IV amiodarone today and IV heparin.        Acute respiratory failure  Multifactorial.  Some expiratory wheezes noted today.  Defer to pulmonary if needs any treatment for bronchospasm.  Does not appear objectively volume overloaded and renal parameters suggestive preresult azotemia.  Hold Lasix at the present time.      *Pleural effusion  Status post thoracentesis.                Ej Newton MD

## 2025-04-12 NOTE — PROGRESS NOTES
Patient refused cpap. She currently has a SpO2 greater than 90% and no respiratory distress noted at this time.

## 2025-04-12 NOTE — PLAN OF CARE
Problem: Safety - Adult  Goal: Free from fall injury  Outcome: Progressing     Problem: Skin/Tissue Integrity  Goal: Skin integrity remains intact  Description: 1.  Monitor for areas of redness and/or skin breakdown  2.  Assess vascular access sites hourly  3.  Every 4-6 hours minimum:  Change oxygen saturation probe site  4.  Every 4-6 hours:  If on nasal continuous positive airway pressure, respiratory therapy assess nares and determine need for appliance change or resting period  Outcome: Progressing     Problem: Chronic Conditions and Co-morbidities  Goal: Patient's chronic conditions and co-morbidity symptoms are monitored and maintained or improved  Outcome: Progressing     Problem: Discharge Planning  Goal: Discharge to home or other facility with appropriate resources  Outcome: Progressing     Problem: ABCDS Injury Assessment  Goal: Absence of physical injury  Outcome: Progressing     Problem: Respiratory - Adult  Goal: Achieves optimal ventilation and oxygenation  Outcome: Progressing     Problem: Cardiovascular - Adult  Goal: Absence of cardiac dysrhythmias or at baseline  Outcome: Progressing     Problem: Skin/Tissue Integrity - Adult  Goal: Skin integrity remains intact  Description: 1.  Monitor for areas of redness and/or skin breakdown  2.  Assess vascular access sites hourly  3.  Every 4-6 hours minimum:  Change oxygen saturation probe site  4.  Every 4-6 hours:  If on nasal continuous positive airway pressure, respiratory therapy assess nares and determine need for appliance change or resting period  Outcome: Progressing     Problem: Musculoskeletal - Adult  Goal: Return mobility to safest level of function  Outcome: Progressing  Goal: Return ADL status to a safe level of function  Outcome: Progressing     Problem: Gastrointestinal - Adult  Goal: Maintains or returns to baseline bowel function  Outcome: Progressing  Goal: Maintains adequate nutritional intake  Outcome: Progressing     Problem:

## 2025-04-12 NOTE — PROGRESS NOTES
Hospitalist Progress Note   Admit Date:  2025 11:10 AM   Name:  Violet Siddiqui   Age:  85 y.o.  Sex:  female  :  1940   MRN:  [id number]   Room:  [facility]/01    Presenting/Chief Complaint: Shortness of Breath (Upon exertion. X multiple months. 88% RA)     Reason(s) for Admission: Pleural effusion [J90]  Hypoxia [R09.02]  Atrial fibrillation, unspecified type (HCC) [I48.91]     Hospital Course:   Please refer to the admission H&P for details of presentation.      In summary, Violet Siddiqui is a 85 y.o. female with medical history significant for left carotid endarterectomy, SIADH, hypothyroidism, hypertension, carotid artery disease status post CEA who presented with SOB.   Patient was discharged from the hospital  where CT scan revealed mass on lungs. Large left pleural effusion was drained during that hospital stay.     Patient has PET  on  and noticed to have wheezing with progressively worsening shortness of breath after that scan.  On presentation, she was saturating at 88% on RA.    Patient underwent thoracentesis with pulmonary on 4/10 with removal of 380 cc from the left pleural effusion.  Surgery has been consulted for biopsy of lymphadenopathy.       Subjective/24 hr Events (25) :  No acute overnight events.    Still on 4L. No significant improvement in breathing.  No chest pain or palpitations.    Assessment & Plan:   Pleural effusion  Acute hypoxic respiratory failure  S/p thoracentesis on 4/10 with removal of 380cc of left pleural effusion  - appreciate pulmonary's recommendation.  Pending cytology from thoracentesis.  - Continue O2 supplementation wean as tolerated  - Hold IV Lasix per Cardiology rec  -Will order CXR.  Will order DuoNeb for wheezing.      Atrial fibrillation, new onset  -Appreciate cardiology.  -Continue with IV amiodarone  -Continue with heparin gtt.      Left hilar mass  Mediastinal and supraclavicular adenopathy  Noted in PET scan  - surgery  0.20 K/UL    Immature Granulocytes Absolute 0.32 0.0 - 0.5 K/UL   Basic Metabolic Panel w/ Reflex to MG    Collection Time: 04/11/25  6:28 AM   Result Value Ref Range    Sodium 136 136 - 145 mmol/L    Potassium 3.5 3.5 - 5.1 mmol/L    Chloride 95 (L) 98 - 107 mmol/L    CO2 32 (H) 20 - 29 mmol/L    Anion Gap 9 7 - 16 mmol/L    Glucose 133 (H) 70 - 99 mg/dL    BUN 50 (H) 8 - 23 MG/DL    Creatinine 0.84 0.60 - 1.10 MG/DL    Est, Glom Filt Rate 68 >60 ml/min/1.73m2    Calcium 9.3 8.8 - 10.2 MG/DL   Magnesium    Collection Time: 04/11/25  6:28 AM   Result Value Ref Range    Magnesium 2.1 1.8 - 2.4 mg/dL   Anti-XA, Heparin    Collection Time: 04/11/25  3:03 PM   Result Value Ref Range    Anti-XA Unfrac Heparin 0.90 (H) 0.3 - 0.7 IU/mL   Anti-XA, Heparin    Collection Time: 04/11/25 10:13 PM   Result Value Ref Range    Anti-XA Unfrac Heparin 0.46 0.3 - 0.7 IU/mL   Basic Metabolic Panel w/ Reflex to MG    Collection Time: 04/12/25  7:32 AM   Result Value Ref Range    Sodium 134 (L) 136 - 145 mmol/L    Potassium 3.7 3.5 - 5.1 mmol/L    Chloride 93 (L) 98 - 107 mmol/L    CO2 32 (H) 20 - 29 mmol/L    Anion Gap 10 7 - 16 mmol/L    Glucose 128 (H) 70 - 99 mg/dL    BUN 62 (H) 8 - 23 MG/DL    Creatinine 0.90 0.60 - 1.10 MG/DL    Est, Glom Filt Rate 63 >60 ml/min/1.73m2    Calcium 9.7 8.8 - 10.2 MG/DL   CBC with Auto Differential    Collection Time: 04/12/25  7:32 AM   Result Value Ref Range    WBC 16.3 (H) 4.3 - 11.1 K/uL    RBC 4.10 4.05 - 5.2 M/uL    Hemoglobin 12.6 11.7 - 15.4 g/dL    Hematocrit 38.5 35.8 - 46.3 %    MCV 93.9 82 - 102 FL    MCH 30.7 26.1 - 32.9 PG    MCHC 32.7 31.4 - 35.0 g/dL    RDW 13.7 11.9 - 14.6 %    Platelets 397 150 - 450 K/uL    MPV 10.3 9.4 - 12.3 FL    nRBC 0.00 0.0 - 0.2 K/uL    Differential Type AUTOMATED      Neutrophils % 72.4 43.0 - 78.0 %    Lymphocytes % 10.8 (L) 13.0 - 44.0 %    Monocytes % 10.3 4.0 - 12.0 %    Eosinophils % 2.6 0.5 - 7.8 %    Basophils % 0.5 0.0 - 2.0 %    Immature  Granulocytes % 3.4 0.0 - 5.0 %    Neutrophils Absolute 11.83 (H) 1.70 - 8.20 K/UL    Lymphocytes Absolute 1.76 0.50 - 4.60 K/UL    Monocytes Absolute 1.69 (H) 0.10 - 1.30 K/UL    Eosinophils Absolute 0.42 0.00 - 0.80 K/UL    Basophils Absolute 0.08 0.00 - 0.20 K/UL    Immature Granulocytes Absolute 0.56 (H) 0.0 - 0.5 K/UL   Anti-XA, Heparin    Collection Time: 04/12/25  7:32 AM   Result Value Ref Range    Anti-XA Unfrac Heparin 0.16 (L) 0.3 - 0.7 IU/mL       No results for input(s): \"COVID19\" in the last 72 hours.    Current Meds:  Current Facility-Administered Medications   Medication Dose Route Frequency    [Held by provider] furosemide (LASIX) injection 20 mg  20 mg IntraVENous BID    ipratropium 0.5 mg-albuterol 2.5 mg (DUONEB) nebulizer solution 1 Dose  1 Dose Inhalation Q4H WA RT    heparin (porcine) injection 4,000 Units  4,000 Units IntraVENous PRN    heparin (porcine) injection 2,000 Units  2,000 Units IntraVENous PRN    heparin 25,000 units in dextrose 5% 250 mL (premix) infusion  5-30 Units/kg/hr IntraVENous Continuous    melatonin tablet 6 mg  6 mg Oral Nightly PRN    amiodarone (CORDARONE) 450 mg in dextrose 5 % 250 mL infusion ([id number])  1 mg/min IntraVENous Continuous    ALPRAZolam (XANAX) tablet 0.25 mg  0.25 mg Oral Daily PRN    aspirin EC tablet 81 mg  81 mg Oral BID    atorvastatin (LIPITOR) tablet 10 mg  10 mg Oral QPM    coenzyme Q10 capsule 100 mg  (Patient Supplied)  100 mg Oral Lunch    levothyroxine (SYNTHROID) tablet 88 mcg  88 mcg Oral Daily    [Held by provider] lisinopril (PRINIVIL;ZESTRIL) tablet 20 mg  20 mg Oral BID    meclizine (ANTIVERT) tablet 25 mg  25 mg Oral TID PRN    sodium chloride tablet 1 g  1 g Oral TID WC    traZODone (DESYREL) tablet 50 mg  50 mg Oral Nightly PRN    urea (URE-NA) packet 15 g  15 g Oral BID    sodium chloride flush 0.9 % injection 5-40 mL  5-40 mL IntraVENous 2 times per day    sodium chloride flush 0.9 % injection 5-40 mL  5-40 mL IntraVENous PRN    0.9

## 2025-04-13 NOTE — CONSULTS
Initially consulted for PICC placement, but due to patient having large bulky mediastinal lymphadenopathy the patient is not a candidate for us and we would recommend placement with fluoroscopy.    Ultrasound was used to find the vein which was compressible and without any ultrasound features of an artery or nerve bundle. Skin was cleaned and disinfected prior to IV puncture.  Under real-time ultrasound guidance peripheral access was obtained in the right forearm using 22 G 1.75\" Peripheral IV catheter after 1 attempt(s). No immediate complications noted. Patient tolerated the procedure well.  Refer to IV flowsheet for further documentation.

## 2025-04-13 NOTE — PROGRESS NOTES
04/13/25 1939   NIV Type   NIV Started/Stopped Off  (pt declined)   Assessment   Pulse 88   Respirations 18   SpO2 97 %   Breath Sounds   Respiratory Pattern Regular   Settings/Measurements   O2 Flow Rate (L/min) 2 L/min   Oxygen Therapy/Pulse Ox   O2 Therapy Oxygen humidified   $Oxygen $Daily Charge   O2 Device Nasal cannula   Pulse Oximeter Device Mode Continuous   Pulse Oximeter Device Location Forehead   $Pulse Oximeter $Spot check (multiple/continuous)     Spoke with patient regarding cpap orders.  Pt stated that she does not have a home unit and at this time, would not like to wear a hospital cpap.  However, if she changes her mind, she will reach out.

## 2025-04-13 NOTE — PROGRESS NOTES
Violet Siddiqui  Admission Date: 4/8/2025         Daily Progress Note: 4/13/2025    Background:   85 y.o.  female seen and evaluated for recurrent pleural effusion.  She was seen by on 4/1 for left pleural effusion.  She has new concern for pulmonary malignancy, SÁNCHEZ and recurrent left pleural effusion with negative cytology. Her PMH includes left carotid artery stenosis, T2DM, HTN, CKD, hypothyroidism, SIADH, former tobacco abuse 60 total pack years- quit in 2014. She has not seen cardiology in the past. Denies pulmonary history.  Presented to the ED with SOB. CXR with recurrent left effusion. Her recent CT A/P with long segment of wall thickening involving the sigmoid colon with trace pericolonic stranding, no abscess or extraluminal air seen. CT Chest with bilateral pleural effusions and bulky mediastinal lymphadenopathy and hilar lymphadenopathy. PET with bilateral hypermetabolic supraclavicular adenopathy, poorly defined hypermetabolic left hilar mass with compression  of the left lower lobe bronchus and complete left lower lobe atelectasis. There  is also extensive hypermetabolic mediastinal adenopathy largest node is right  paratracheal, 4.9 x 3.7 cm and 6.9 SUV max.     When seen in ED, HR in 150-160's, new onset atrial fibrillation with RVR. She is hypotensive with tachypnea. She is is on NC with sat 95%. Cardiology called to see. We were asked to see her for acute respiratory failure with a new onset a fib RVR with hypotension.      Her daughter confirms DNI but wants medications and CPR if needed. She lives with her , walks, and is not on oxygen at home.     Subjective:     Overall CXR stable.  Only 2L NC, sat 92-94%.      Current Facility-Administered Medications   Medication Dose Route Frequency    amiodarone (CORDARONE) 450 mg in dextrose 5 % 250 mL infusion  0.5 mg/min IntraVENous Continuous    [Held by provider] furosemide (LASIX) injection 20 mg  20 mg IntraVENous

## 2025-04-13 NOTE — PLAN OF CARE
Problem: Safety - Adult  Goal: Free from fall injury  4/13/2025 0213 by Lori Villatoro RN  Outcome: Progressing  Flowsheets (Taken 4/12/2025 2010)  Free From Fall Injury: Instruct family/caregiver on patient safety  4/12/2025 1311 by Zandra Arias RN  Outcome: Progressing     Problem: Skin/Tissue Integrity  Goal: Skin integrity remains intact  Description: 1.  Monitor for areas of redness and/or skin breakdown  2.  Assess vascular access sites hourly  3.  Every 4-6 hours minimum:  Change oxygen saturation probe site  4.  Every 4-6 hours:  If on nasal continuous positive airway pressure, respiratory therapy assess nares and determine need for appliance change or resting period  4/13/2025 0213 by Lori Villatoro RN  Outcome: Progressing  Flowsheets (Taken 4/12/2025 2010)  Skin Integrity Remains Intact: Monitor for areas of redness and/or skin breakdown  4/12/2025 1311 by Zandra Arias RN  Outcome: Progressing     Problem: Chronic Conditions and Co-morbidities  Goal: Patient's chronic conditions and co-morbidity symptoms are monitored and maintained or improved  4/13/2025 0213 by Lori Villatoro RN  Outcome: Progressing  Flowsheets (Taken 4/12/2025 2010)  Care Plan - Patient's Chronic Conditions and Co-Morbidity Symptoms are Monitored and Maintained or Improved: Monitor and assess patient's chronic conditions and comorbid symptoms for stability, deterioration, or improvement  4/12/2025 1311 by Zandra Arias RN  Outcome: Progressing     Problem: Discharge Planning  Goal: Discharge to home or other facility with appropriate resources  4/13/2025 0213 by Lori Villatoro RN  Outcome: Progressing  Flowsheets (Taken 4/12/2025 2010)  Discharge to home or other facility with appropriate resources: Identify barriers to discharge with patient and caregiver  4/12/2025 1311 by Zandra Arias RN  Outcome: Progressing     Problem: ABCDS Injury Assessment  Goal: Absence of physical injury  4/13/2025 0213 by Irving  KAYLA Sandoval  Outcome: Progressing  Flowsheets (Taken 4/12/2025 2010)  Absence of Physical Injury: Implement safety measures based on patient assessment  4/12/2025 1311 by Zandra Arias RN  Outcome: Progressing     Problem: Respiratory - Adult  Goal: Achieves optimal ventilation and oxygenation  4/13/2025 0213 by Lori Villatoro RN  Outcome: Progressing  Flowsheets (Taken 4/12/2025 2010)  Achieves optimal ventilation and oxygenation:   Assess for changes in respiratory status   Assess for changes in mentation and behavior  4/12/2025 1311 by Zandra Arias RN  Outcome: Progressing     Problem: Cardiovascular - Adult  Goal: Absence of cardiac dysrhythmias or at baseline  4/13/2025 0213 by Lori Villatoro RN  Outcome: Progressing  Flowsheets (Taken 4/12/2025 2010)  Absence of cardiac dysrhythmias or at baseline: Monitor cardiac rate and rhythm  4/12/2025 1311 by Zandra Arias RN  Outcome: Progressing     Problem: Skin/Tissue Integrity - Adult  Goal: Skin integrity remains intact  Description: 1.  Monitor for areas of redness and/or skin breakdown  2.  Assess vascular access sites hourly  3.  Every 4-6 hours minimum:  Change oxygen saturation probe site  4.  Every 4-6 hours:  If on nasal continuous positive airway pressure, respiratory therapy assess nares and determine need for appliance change or resting period  4/13/2025 0213 by Lori Villatoro RN  Outcome: Progressing  Flowsheets (Taken 4/12/2025 2010)  Skin Integrity Remains Intact: Monitor for areas of redness and/or skin breakdown  4/12/2025 1311 by Zandra Arias RN  Outcome: Progressing     Problem: Musculoskeletal - Adult  Goal: Return mobility to safest level of function  4/13/2025 0213 by Lori Villatoro RN  Outcome: Progressing  Flowsheets (Taken 4/12/2025 2010)  Return Mobility to Safest Level of Function:   Assess patient stability and activity tolerance for standing, transferring and ambulating with or without assistive devices   Assist with

## 2025-04-13 NOTE — PROGRESS NOTES
Assessment/Plan  GI was originally asked to evaluate the patient on/10/25.  At that time she had a PET/CT which showed increased uptake in the sigmoid colon.  She has a history of sigmoid diverticulosis with 2 failed colonoscopy attempts in the past.  She reports to have had normal CT colonoscopies after that.  Tentative plan for biopsy of the supraclavicular lymph node tomorrow.  She denies any abdominal pain concerning for diverticulitis but with her increasing white count would recommend starting Cipro and Flagyl.  No plans for colonoscopy at this time until she improves from a respiratory standpoint as she is short of breath talking to me sitting upright.  GI will sign off, have patient follow-up after discharge for colonoscopy as previously planned.      MEDs:     Current Facility-Administered Medications   Medication Dose Route Frequency Provider Last Rate Last Admin    amiodarone (CORDARONE) 450 mg in dextrose 5 % 250 mL infusion  0.5 mg/min IntraVENous Continuous Kelley Morales MD 16.7 mL/hr at 04/13/25 0845 0.5 mg/min at 04/13/25 0845    [Held by provider] furosemide (LASIX) injection 20 mg  20 mg IntraVENous BID Ana Peres APRN - CRISTY        ipratropium 0.5 mg-albuterol 2.5 mg (DUONEB) nebulizer solution 1 Dose  1 Dose Inhalation Q4H WA  Lexis Arora MD   1 Dose at 04/13/25 1120    levothyroxine (SYNTHROID) tablet 88 mcg  88 mcg Oral QAM  Lexis Arora MD   88 mcg at 04/13/25 0539    pantoprazole (PROTONIX) tablet 40 mg  40 mg Oral BID  Angella Ashton APRN - NP   40 mg at 04/13/25 0539    docusate sodium (COLACE) capsule 100 mg  100 mg Oral BID PRN Aida Pickens APRN - NP   100 mg at 04/12/25 2013    oxyCODONE (ROXICODONE) immediate release tablet 5 mg  5 mg Oral Q4H PRN Angella Ashton APRN - NP   5 mg at 04/13/25 0511    heparin (porcine) injection 4,000 Units  4,000 Units IntraVENous PRN Jaelyn Alvarez MD        heparin (porcine) injection 2,000    Skin:     Coloration: Skin is not jaundiced.   Neurological:      Mental Status: She is alert and oriented to person, place, and time.   Psychiatric:         Behavior: Behavior normal.         Subjective:  Patient denies any abdominal pain.  She had a bowel movement on Friday that was nonbloody from her report.  GI was asked to come back and see her as family had questions about a colonoscopy.    Lang Martínez, DO

## 2025-04-13 NOTE — PROGRESS NOTES
[facility] CARDIOLOGY PROGRESS NOTE           4/13/2025 8:36 AM    Admit Date: 4/8/2025      Subjective:   Patient currently on sinus rhythm.  Oxygen weaned to 2 L.  Renal function appears stable.  No edema on exam.  Chest x-ray yesterday:  Upper mediastinal widening consistent with adenopathy. Right fissural effusion and a small left lower lobe effusion. No acute osseous abnormalities.         ROS:  Cardiovascular:  As noted above    Objective:      Vitals:    04/13/25 0408 04/13/25 0511 04/13/25 0735 04/13/25 0831   BP: (!) 126/48  (!) 134/59    Pulse: 84  70    Resp: 18 18 18 17   Temp: 97.5 °F (36.4 °C)  97.3 °F (36.3 °C)    TempSrc: Oral  Temporal    SpO2: 98%  90% 92%   Weight:       Height:           Physical Exam:  General-elderly white female in no acute distress.  Neck- supple, no JVD  CV-irregular regular rhythm   Lung- faint expiratory wheezes  Abd- soft, nontender, nondistended  Ext- no edema bilaterally.  Skin- warm and dry      Data Review:   Recent Labs     04/13/25  0623 04/12/25  0732 04/11/25  0628   WBC 18.8* 16.3* 13.4*   HGB 11.8 12.6 12.7   HCT 35.8 38.5 40.2   MCV 91.8 93.9 95.5    397 335       Recent Labs     04/13/25  0623 04/10/25  0538 04/09/25  0754   *   < > 143   K 3.9   < > 3.9   CL 91*   < > 101   CO2 31*   < > 31*   BUN 57*   < > 28*   CREATININE 0.88   < > 0.70   GLUCOSE 152*   < > 108*   CALCIUM 9.6   < > 8.8   BILITOT  --   --  0.4   ALKPHOS  --   --  125*   AST  --   --  56*   ALT  --   --  52*   LABGLOM 64   < > 85   GLOB  --   --  3.8*    < > = values in this interval not displayed.       No results for input(s): \"CKTOTAL\", \"CKMB\", \"CKMBINDEX\", \"DDIMER\", \"TROPONINI\" in the last 720 hours.    Echo (4/9/25):       Left Ventricle: Normal left ventricular systolic function with a visually estimated EF of 60 - 65%. Left ventricle size is normal. Normal wall thickness. Normal wall motion. Abnormal diastolic function.    Aortic Valve: Mild sclerosis of the

## 2025-04-13 NOTE — PROGRESS NOTES
Patient's lactic acid is 2.6  Also has a mildly elevated Pro-Silvestre of 0.14  Cipro and Flagyl started by GI  Chest x-ray not much change compared to prior study  Ua pending   Will hold off on sepsis protocol IV fluids given volume overload issues.       35

## 2025-04-13 NOTE — PROGRESS NOTES
Hospitalist Progress Note   Admit Date:  2025 11:10 AM   Name:  Violet Siddiqui   Age:  85 y.o.  Sex:  female  :  1940   MRN:  [id number]   Room:  403/01    Presenting/Chief Complaint: Shortness of Breath (Upon exertion. X multiple months. 88% RA)     Reason(s) for Admission: Pleural effusion [J90]  Hypoxia [R09.02]  Atrial fibrillation, unspecified type (HCC) [I48.91]     Hospital Course:   Please refer to the admission H&P for details of presentation.      In summary, Violet Siddiqui is a 85 y.o. female with medical history significant for left carotid endarterectomy, SIADH, hypothyroidism, hypertension, carotid artery disease status post CEA who presented with SOB.   Patient was discharged from the hospital  where CT scan revealed mass on lungs. Large left pleural effusion was drained during that hospital stay.     Patient has PET  on  and noticed to have wheezing with progressively worsening shortness of breath after that scan.  On presentation, she was saturating at 88% on RA.    Patient underwent thoracentesis with pulmonary on 4/10 with removal of 380 cc from the left pleural effusion.  Surgery has been consulted for biopsy of lymphadenopathy.       Subjective/24 hr Events (25) :  Patient seen and evaluated  New patient for me today  States that overall she is feeling better because of the pain in her hips and legs has improved.  She was on room air but desatted to about 85% so placed back on 2 L via nasal cannula  Her daughter-in-law is at bedside  They are concerned about how she would do a bowel prep and in house-as the patient has been intolerant of bowel preps in the past.  Also told me that they have not been able to get a colonoscopy in her secondary to an underlying kink/in her colon.  They are also concerned about the neck biopsy which they were told may happen on Monday but has not been scheduled.  They are also concerned about the Lasix being on  Trachea midline   CV:   Irregularly irregular.  No murmur.  Lungs:   Diminished breath sounds bilaterally.  Wheezing on the right upper lung.  No significant crackles.  Abdomen:   Soft, nontender, nondistended.  Extremities: Lower extremity edema  Skin:     No rashes.  Normal coloration.   Warm and dry.    Neuro:  CN II-XII grossly intact.    Psych:  Normal mood and affect.      I have personally reviewed labs and tests:  Recent Labs:  Recent Results (from the past 48 hours)   Anti-XA, Heparin    Collection Time: 04/11/25  3:03 PM   Result Value Ref Range    Anti-XA Unfrac Heparin 0.90 (H) 0.3 - 0.7 IU/mL   Anti-XA, Heparin    Collection Time: 04/11/25 10:13 PM   Result Value Ref Range    Anti-XA Unfrac Heparin 0.46 0.3 - 0.7 IU/mL   Basic Metabolic Panel w/ Reflex to MG    Collection Time: 04/12/25  7:32 AM   Result Value Ref Range    Sodium 134 (L) 136 - 145 mmol/L    Potassium 3.7 3.5 - 5.1 mmol/L    Chloride 93 (L) 98 - 107 mmol/L    CO2 32 (H) 20 - 29 mmol/L    Anion Gap 10 7 - 16 mmol/L    Glucose 128 (H) 70 - 99 mg/dL    BUN 62 (H) 8 - 23 MG/DL    Creatinine 0.90 0.60 - 1.10 MG/DL    Est, Glom Filt Rate 63 >60 ml/min/1.73m2    Calcium 9.7 8.8 - 10.2 MG/DL   CBC with Auto Differential    Collection Time: 04/12/25  7:32 AM   Result Value Ref Range    WBC 16.3 (H) 4.3 - 11.1 K/uL    RBC 4.10 4.05 - 5.2 M/uL    Hemoglobin 12.6 11.7 - 15.4 g/dL    Hematocrit 38.5 35.8 - 46.3 %    MCV 93.9 82 - 102 FL    MCH 30.7 26.1 - 32.9 PG    MCHC 32.7 31.4 - 35.0 g/dL    RDW 13.7 11.9 - 14.6 %    Platelets 397 150 - 450 K/uL    MPV 10.3 9.4 - 12.3 FL    nRBC 0.00 0.0 - 0.2 K/uL    Differential Type AUTOMATED      Neutrophils % 72.4 43.0 - 78.0 %    Lymphocytes % 10.8 (L) 13.0 - 44.0 %    Monocytes % 10.3 4.0 - 12.0 %    Eosinophils % 2.6 0.5 - 7.8 %    Basophils % 0.5 0.0 - 2.0 %    Immature Granulocytes % 3.4 0.0 - 5.0 %    Neutrophils Absolute 11.83 (H) 1.70 - 8.20 K/UL    Lymphocytes Absolute 1.76 0.50 - 4.60 K/UL     Monocytes Absolute 1.69 (H) 0.10 - 1.30 K/UL    Eosinophils Absolute 0.42 0.00 - 0.80 K/UL    Basophils Absolute 0.08 0.00 - 0.20 K/UL    Immature Granulocytes Absolute 0.56 (H) 0.0 - 0.5 K/UL   Anti-XA, Heparin    Collection Time: 04/12/25  7:32 AM   Result Value Ref Range    Anti-XA Unfrac Heparin 0.16 (L) 0.3 - 0.7 IU/mL   Anti-XA, Heparin    Collection Time: 04/12/25  6:39 PM   Result Value Ref Range    Anti-XA Unfrac Heparin 0.62 0.3 - 0.7 IU/mL   Anti-XA, Heparin    Collection Time: 04/13/25  1:18 AM   Result Value Ref Range    Anti-XA Unfrac Heparin 0.68 0.3 - 0.7 IU/mL   CBC    Collection Time: 04/13/25  6:23 AM   Result Value Ref Range    WBC 18.8 (H) 4.3 - 11.1 K/uL    RBC 3.90 (L) 4.05 - 5.2 M/uL    Hemoglobin 11.8 11.7 - 15.4 g/dL    Hematocrit 35.8 35.8 - 46.3 %    MCV 91.8 82 - 102 FL    MCH 30.3 26.1 - 32.9 PG    MCHC 33.0 31.4 - 35.0 g/dL    RDW 14.3 11.9 - 14.6 %    Platelets 421 150 - 450 K/uL    MPV 10.7 9.4 - 12.3 FL    nRBC 0.00 0.0 - 0.2 K/uL   Basic Metabolic Panel    Collection Time: 04/13/25  6:23 AM   Result Value Ref Range    Sodium 132 (L) 136 - 145 mmol/L    Potassium 3.9 3.5 - 5.1 mmol/L    Chloride 91 (L) 98 - 107 mmol/L    CO2 31 (H) 20 - 29 mmol/L    Anion Gap 10 7 - 16 mmol/L    Glucose 152 (H) 70 - 99 mg/dL    BUN 57 (H) 8 - 23 MG/DL    Creatinine 0.88 0.60 - 1.10 MG/DL    Est, Glom Filt Rate 64 >60 ml/min/1.73m2    Calcium 9.6 8.8 - 10.2 MG/DL   Magnesium    Collection Time: 04/13/25  6:23 AM   Result Value Ref Range    Magnesium 2.1 1.8 - 2.4 mg/dL       No results for input(s): \"COVID19\" in the last 72 hours.    Current Meds:  Current Facility-Administered Medications   Medication Dose Route Frequency    [Held by provider] furosemide (LASIX) injection 20 mg  20 mg IntraVENous BID    ipratropium 0.5 mg-albuterol 2.5 mg (DUONEB) nebulizer solution 1 Dose  1 Dose Inhalation Q4H WA RT    levothyroxine (SYNTHROID) tablet 88 mcg  88 mcg Oral QAM AC    pantoprazole (PROTONIX) tablet 40  mg  40 mg Oral BID AC    docusate sodium (COLACE) capsule 100 mg  100 mg Oral BID PRN    oxyCODONE (ROXICODONE) immediate release tablet 5 mg  5 mg Oral Q4H PRN    heparin (porcine) injection 4,000 Units  4,000 Units IntraVENous PRN    heparin (porcine) injection 2,000 Units  2,000 Units IntraVENous PRN    heparin 25,000 units in dextrose 5% 250 mL (premix) infusion  5-30 Units/kg/hr IntraVENous Continuous    melatonin tablet 6 mg  6 mg Oral Nightly PRN    amiodarone (CORDARONE) 450 mg in dextrose 5 % 250 mL infusion ([id number])  1 mg/min IntraVENous Continuous    ALPRAZolam (XANAX) tablet 0.25 mg  0.25 mg Oral Daily PRN    aspirin EC tablet 81 mg  81 mg Oral BID    atorvastatin (LIPITOR) tablet 10 mg  10 mg Oral QPM    coenzyme Q10 capsule 100 mg  (Patient Supplied)  100 mg Oral Lunch    [Held by provider] lisinopril (PRINIVIL;ZESTRIL) tablet 20 mg  20 mg Oral BID    meclizine (ANTIVERT) tablet 25 mg  25 mg Oral TID PRN    sodium chloride tablet 1 g  1 g Oral TID WC    traZODone (DESYREL) tablet 50 mg  50 mg Oral Nightly PRN    urea (URE-NA) packet 15 g  15 g Oral BID    sodium chloride flush 0.9 % injection 5-40 mL  5-40 mL IntraVENous 2 times per day    sodium chloride flush 0.9 % injection 5-40 mL  5-40 mL IntraVENous PRN    0.9 % sodium chloride infusion   IntraVENous PRN    potassium chloride (KLOR-CON M) extended release tablet 40 mEq  40 mEq Oral PRN    Or    potassium bicarb-citric acid (EFFER-K) effervescent tablet 40 mEq  40 mEq Oral PRN    Or    potassium chloride 10 mEq/100 mL IVPB (Peripheral Line)  10 mEq IntraVENous PRN    magnesium sulfate 2000 mg in 50 mL IVPB premix  2,000 mg IntraVENous PRN    ondansetron (ZOFRAN-ODT) disintegrating tablet 4 mg  4 mg Oral Q8H PRN    Or    ondansetron (ZOFRAN) injection 4 mg  4 mg IntraVENous Q6H PRN    polyethylene glycol (GLYCOLAX) packet 17 g  17 g Oral Daily PRN    acetaminophen (TYLENOL) tablet 650 mg  650 mg Oral Q6H PRN    Or    acetaminophen (TYLENOL)

## 2025-04-14 ENCOUNTER — ANESTHESIA EVENT (OUTPATIENT)
Dept: SURGERY | Age: 85
End: 2025-04-14
Payer: MEDICARE

## 2025-04-14 ENCOUNTER — TELEPHONE (OUTPATIENT)
Dept: GASTROENTEROLOGY | Age: 85
End: 2025-04-14

## 2025-04-14 PROBLEM — R65.20 SEVERE SEPSIS (HCC): Status: ACTIVE | Noted: 2025-04-14

## 2025-04-14 PROBLEM — A41.9 SEVERE SEPSIS (HCC): Status: ACTIVE | Noted: 2025-04-14

## 2025-04-14 PROBLEM — R93.89 ABNORMAL CT SCAN: Status: ACTIVE | Noted: 2025-04-14

## 2025-04-14 PROBLEM — K52.9 COLITIS: Status: ACTIVE | Noted: 2025-04-14

## 2025-04-14 NOTE — PROGRESS NOTES
[facility] CARDIOLOGY PROGRESS NOTE           4/14/2025 7:30 AM    Admit Date: 4/8/2025      Subjective:   Patient currently in sinus rhythm.  She denies any active dyspnea or chest pain.  Currently on 2 L of oxygen.  Hemodynamically stable.  Possible biopsy of her lymph node today but no notes from surgery in this regard.  Renal function stable.  No edema on exam.  Remains on IV amiodarone at 1 mg/min.  Apparently 2 L positive yesterday.      ROS:  Cardiovascular:  As noted above    Objective:      Vitals:    04/14/25 0148 04/14/25 0321 04/14/25 0322 04/14/25 0330   BP: (!) 121/54   110/64   Pulse: 76 80 80    Resp: 24   24   Temp:    97.8 °F (36.6 °C)   TempSrc:    Axillary   SpO2: 96%  99%    Weight:       Height:           Physical Exam:  General-elderly white female in no acute distress.  Neck- supple, no JVD  CV-irregular regular rhythm   Lung- faint expiratory wheezes  Abd- soft, nontender, nondistended  Ext- no edema bilaterally.  Skin- warm and dry      Data Review:   Recent Labs     04/14/25  0028 04/13/25  0623 04/12/25  0732   WBC 18.5* 18.8* 16.3*   HGB 11.8 11.8 12.6   HCT 35.0* 35.8 38.5   MCV 90.7 91.8 93.9    421 397       Recent Labs     04/14/25  0028 04/10/25  0538 04/09/25  0754   *   < > 143   K 4.5   < > 3.9   CL 92*   < > 101   CO2 30*   < > 31*   BUN 60*   < > 28*   CREATININE 0.82   < > 0.70   GLUCOSE 169*   < > 108*   CALCIUM 9.4   < > 8.8   BILITOT  --   --  0.4   ALKPHOS  --   --  125*   AST  --   --  56*   ALT  --   --  52*   LABGLOM 71   < > 85   GLOB  --   --  3.8*    < > = values in this interval not displayed.       No results for input(s): \"CKTOTAL\", \"CKMB\", \"CKMBINDEX\", \"DDIMER\", \"TROPONINI\" in the last 720 hours.    Echo (4/9/25):       Left Ventricle: Normal left ventricular systolic function with a visually estimated EF of 60 - 65%. Left ventricle size is normal. Normal wall thickness. Normal wall motion. Abnormal diastolic function.    Aortic Valve:  Mild sclerosis of the aortic valve cusps.    Mitral Valve: Mild regurgitation.    Tricuspid Valve: Mild regurgitation.    Pericardium: Trivial pericardial effusion present. No indication of cardiac tamponade.       Assessment/Plan:     Active Hospital Problems    Abnormal abdominal CT scan  Concerning for malignancy.  Awaiting lymph node biopsy.  Unclear timing      Atrial fibrillation with rapid ventricular response (HCC)  Continue IV amiodarone but decrease dose to 0.5 mg/hr and IV heparin.  Hold heparin for procedure.      Acute respiratory failure  Multifactorial.  Oxygen weaned to 2 L.  Significantly positive in her I/Os over the last 24 hours.  Restart diuretics and monitor volume status daily.      *Pleural effusion  Status post thoracentesis.                Ej Newton MD

## 2025-04-14 NOTE — PLAN OF CARE
Problem: Safety - Adult  Goal: Free from fall injury  Outcome: Progressing  Flowsheets (Taken 4/13/2025 2045)  Free From Fall Injury: Instruct family/caregiver on patient safety     Problem: Skin/Tissue Integrity  Goal: Skin integrity remains intact  Description: 1.  Monitor for areas of redness and/or skin breakdown  2.  Assess vascular access sites hourly  3.  Every 4-6 hours minimum:  Change oxygen saturation probe site  4.  Every 4-6 hours:  If on nasal continuous positive airway pressure, respiratory therapy assess nares and determine need for appliance change or resting period  Outcome: Progressing  Flowsheets (Taken 4/13/2025 2045)  Skin Integrity Remains Intact: Monitor for areas of redness and/or skin breakdown     Problem: Chronic Conditions and Co-morbidities  Goal: Patient's chronic conditions and co-morbidity symptoms are monitored and maintained or improved  Outcome: Progressing  Flowsheets (Taken 4/13/2025 2045)  Care Plan - Patient's Chronic Conditions and Co-Morbidity Symptoms are Monitored and Maintained or Improved: Monitor and assess patient's chronic conditions and comorbid symptoms for stability, deterioration, or improvement     Problem: Discharge Planning  Goal: Discharge to home or other facility with appropriate resources  Outcome: Progressing  Flowsheets (Taken 4/13/2025 2045)  Discharge to home or other facility with appropriate resources: Identify barriers to discharge with patient and caregiver     Problem: ABCDS Injury Assessment  Goal: Absence of physical injury  Outcome: Progressing  Flowsheets (Taken 4/13/2025 2045)  Absence of Physical Injury: Implement safety measures based on patient assessment     Problem: Respiratory - Adult  Goal: Achieves optimal ventilation and oxygenation  Outcome: Progressing  Flowsheets (Taken 4/13/2025 2045)  Achieves optimal ventilation and oxygenation:   Assess for changes in respiratory status   Assess for changes in mentation and behavior    comfort level or baseline comfort level: Encourage patient to monitor pain and request assistance

## 2025-04-14 NOTE — CARE COORDINATION
Possible biopsy of her lymph node today. Current with [facility], BENNY sent for RN/PT/OT ITM. On 2L supplemental.

## 2025-04-14 NOTE — PROGRESS NOTES
Violet Siddiqui  Admission Date: 4/8/2025         Daily Progress Note: 4/14/2025    Background:   85 y.o.  female seen for recurrent pleural effusion.  She was seen by on 4/1 for left pleural effusion.  She has new concern for pulmonary malignancy, SÁNCHEZ and recurrent left pleural effusion with negative cytology. Her PMH includes left carotid artery stenosis, T2DM, HTN, CKD, hypothyroidism, SIADH, former tobacco abuse 60 total pack years- quit in 2014. She has not seen cardiology in the past. Denies pulmonary history.  Presented to the ED with SOB. CXR with recurrent left effusion. Her recent CT A/P with long segment of wall thickening involving the sigmoid colon with trace pericolonic stranding, no abscess or extraluminal air seen. CT Chest with bilateral pleural effusions and bulky mediastinal lymphadenopathy and hilar lymphadenopathy. PET with bilateral hypermetabolic supraclavicular adenopathy, poorly defined hypermetabolic left hilar mass with compression  of the left lower lobe bronchus and complete left lower lobe atelectasis. There  is also extensive hypermetabolic mediastinal adenopathy largest node is right  paratracheal, 4.9 x 3.7 cm and 6.9 SUV max.     Pt presented to the ER on 4/8 with HR in 150-160's, new onset atrial fibrillation with RVR.and cardiology consulted. We were asked to see her for acute respiratory failure with a new onset a fib RVR with hypotension.      Her daughter confirms DNI but wants medications and CPR if needed. She lives with her , walks, and is not on oxygen at home.   Subjective:     Pt to possibly have excisional supraclavicular biopsy today by general surgery.   Pt +4L since admission. Pt +1.6L in last 24 hours.   Pt is on 2L O2. Pt's daughter-in-law at bedside.   Pt has not eaten anything today. She just got back from ultrasound of supraclavicular lymph node. Pt's heparin gtt being held now.       Current Facility-Administered Medications  PM    Assessment and Plan:  (Medical Decision Making)   Impression: 86yo  female with PMH of left carotid artery stenosis, T2DM, HTN, CKD, hypothyroidism, former tobacco abuse 60 total pack years- quit in 2014.     Principal Problem:    Pleural effusion  Plan:Lymphocyte predominant.  Negative for carcinoma on cytology      Active Problems:    Atrial fibrillation with rapid ventricular response (HCC)  Plan: on amio gtt and hep.  Per cardiology. Heparin gtt being held now.       Acute respiratory failure  Plan: wean O2, does not wear O2 at home.      Lymphadenopathy  Plan: s/p ultrasound evaluation today. Awaiting surgery evaluation to determine timing of excision. Pt NPO for now.       More than 50% of the time documented was spent in face-to-face contact with the patient and in the care of the patient on the floor/unit where the patient is located.    In this split/shared evaluation I performed performed a medically appropriate history and exam, counseled and educated the patient and/or family member, ordered medications, tests or procedures, documented information in EMR, and coordinated care. which accounted for 15 minutes of clinical time.     FLORENCIO Peralta       ATTENDING ADDENDUM:    In this split/shared evaluation I performed reviewed the patients's H&P, available images, labs, cultures., performed a medically appropriate history and exam, documented information in EMR, independently interpreted images, and coordinated care. which accounted for 18 minutes clinical time.     Impression: came back from US now and tired. Awaiting surgery to see if can get Right Supraclavicular LN biopsy. Worried about CLL Avina transformation with worsening diffuse adenopathy. Still on hpearpin drip but being held since 9 AM. Taper oxygen. Lung clear. Abdomen enlarged. Daughter reports not moving bowels much. Will get KUB. Also change miralax to daily and add fleet prn. Continue remaining tx. Also check LDH.

## 2025-04-14 NOTE — PROGRESS NOTES
Hospitalist Progress Note   Admit Date:  2025 11:10 AM   Name:  Violet Siddiqui   Age:  85 y.o.  Sex:  female  :  1940   MRN:  [id number]   Room:  [facility]/01    Presenting/Chief Complaint: Shortness of Breath (Upon exertion. X multiple months. 88% RA)     Reason(s) for Admission: Pleural effusion [J90]  Hypoxia [R09.02]  Atrial fibrillation, unspecified type (HCC) [I48.91]     Hospital Course:   Violet Siddiqui is a 85 y.o. female with medical history significant for left carotid endarterectomy, SIADH, hypothyroidism, hypertension, carotid artery disease status post CEA who presented with SOB.   Patient was recently discharged from the hospital  where CT scan revealed mass on lungs. Large left pleural effusion was drained during that hospital stay.     Patient has PET on  and noticed to have wheezing with progressively worsening shortness of breath after that scan.  On presentation, she was saturating at 88% on RA.    Patient underwent thoracentesis with pulmonary on 4/10 with removal of 380 cc from the left pleural effusion.  Surgery has been consulted for biopsy of lymphadenopathy.       Subjective/24 hr Events (25) :  No acute overnight events.    Having more dyspnea. Otherwise, doing fine.  Tired after ultrasound this AM.    Assessment & Plan:   Pleural effusion  Acute hypoxic respiratory failure  S/p thoracentesis on 4/10 with removal of 380cc of left pleural effusion  - appreciate pulmonary recommendation.  Pending cytology from thoracentesis.  - Continue O2 supplementation wean as tolerated  - Having new wheezing.  CXR ordered; noted worsening of effusion. Will order Lasix.      Atrial fibrillation, new onset  -Appreciate cardiology.  -Continue with IV amiodarone  -Will hold heparin gtt for biopsy today      Left hilar mass  Mediastinal and supraclavicular adenopathy  Noted in PET scan  -CT on 3/31 showed a long segment bowel wall thickening of the sigmoid colon.   Normal sinus rhythm  Rightward axis  Borderline ECG  When compared with ECG of 09-APR-2025 10:52,  PREVIOUS ECG IS PRESENT  Confirmed by BRIDGER MANTILLA (), WYATT ELLIOTT ([id number]) on 4/14/2025 6:53:43 AM     Anti-XA, Heparin    Collection Time: 04/14/25  5:54 AM   Result Value Ref Range    Anti-XA Unfrac Heparin 0.66 0.3 - 0.7 IU/mL   Lactate Dehydrogenase    Collection Time: 04/14/25  5:54 AM   Result Value Ref Range     (H) 127 - 281 U/L   Lactic Acid    Collection Time: 04/14/25  9:44 AM   Result Value Ref Range    Lactic Acid 1.5 0.5 - 2.0 mmol/L       No results for input(s): \"COVID19\" in the last 72 hours.    Current Meds:  Current Facility-Administered Medications   Medication Dose Route Frequency    levalbuterol (XOPENEX) nebulization 0.63 mg  0.63 mg Nebulization Q8H PRN    calcium carbonate (TUMS) chewable tablet 500 mg  500 mg Oral TID PRN    polyethylene glycol (GLYCOLAX) packet 17 g  17 g Oral Daily    sodium phosphate (FLEET) rectal enema 1 enema  1 enema Rectal Once PRN    furosemide (LASIX) injection 40 mg  40 mg IntraVENous Once    [START ON 4/15/2025] furosemide (LASIX) injection 60 mg  60 mg IntraVENous Daily    urea (URE-NA) packet 30 g  30 g Oral BID    amiodarone (CORDARONE) 450 mg in dextrose 5 % 250 mL infusion  0.5 mg/min IntraVENous Continuous    ciprofloxacin (CIPRO) IVPB 400 mg  400 mg IntraVENous Q12H    metroNIDAZOLE (FLAGYL) 500 mg in 0.9% NaCl 100 mL IVPB premix  500 mg IntraVENous Q8H    sodium chloride flush 0.9 % injection 5-40 mL  5-40 mL IntraVENous 2 times per day    sodium chloride flush 0.9 % injection 5-40 mL  5-40 mL IntraVENous PRN    0.9 % sodium chloride infusion   IntraVENous PRN    lidocaine PF 1 % injection 50 mg  50 mg IntraDERmal Once    phenol 1.4 % mouth spray 1 spray  1 spray Mouth/Throat Q2H PRN    ipratropium 0.5 mg-albuterol 2.5 mg (DUONEB) nebulizer solution 1 Dose  1 Dose Inhalation Q4H WA RT    levothyroxine (SYNTHROID) tablet 88 mcg  88 mcg Oral QAM AC     proof read but may still contain some grammatical/other typographical errors.

## 2025-04-14 NOTE — ANESTHESIA PRE PROCEDURE
Department of Anesthesiology  Preprocedure Note       Name:  Violet Siddiqui   Age:  85 y.o.  :  1940                                          MRN:  [id number]         Date:  2025      Surgeon: Surgeon(s):  Pao Mendoza MD    Procedure: Procedure(s):  SUPRACLAVICULAR LYMPH NODE BIOPSY EXCISION DISSECTION    Medications prior to admission:   Prior to Admission medications    Medication Sig Start Date End Date Taking? Authorizing Provider   ALPRAZolam (XANAX) 0.25 MG tablet Take 1 tablet by mouth daily as needed for Anxiety for up to 30 days. Take 30 minutes prior to scan today. May repeat if needed. Max Daily Amount: 0.25 mg 25  Shaneka Lopez MD   Misc. Devices (ROLLATOR ULTRA-LIGHT) MISC 1 each by Does not apply route daily Use as directed to assist with walking. 25   Shaneka Lopez MD   traZODone (DESYREL) 50 MG tablet Take 1 tablet by mouth nightly as needed for Sleep 25   Lyle Pearce DO   sodium chloride 1 g tablet Take 1 tablet by mouth 3 times daily (with meals) 25   Akira Resendiz MD   urea (URE-NA) 15 g PACK packet Take 15 g by mouth in the morning and at bedtime 25  Akira Resendiz MD   atorvastatin (LIPITOR) 10 MG tablet Take 1 tablet by mouth every evening 24   Zuleika Medrano MD   levothyroxine (SYNTHROID) 88 MCG tablet Take 1/2 tab on  and one all other days Indications: a condition with low thyroid hormone levels 24   Zuleika Medrano MD   lisinopril (PRINIVIL;ZESTRIL) 20 MG tablet Take 1 tablet by mouth 2 times daily 24   Zuleika Medrano MD   aspirin 81 MG EC tablet Take 1 tablet by mouth 2 times daily    Automatic Reconciliation, Ar   Calcium Carbonate-Vitamin D (CALCIUM-VITAMIN D) 600-125 MG-UNIT TABS Take 1 tablet by mouth Daily with lunch    Automatic Reconciliation, Ar   coenzyme Q10 100 MG CAPS capsule Take 1 capsule by mouth Daily with lunch    Automatic Reconciliation, Ar   meclizine (ANTIVERT)

## 2025-04-14 NOTE — PERIOP NOTE
Nursing staff notified that pt on surgery schedule with planned arrival to pre-op tomorrow at 1000 for 1155 procedure time.    Yes

## 2025-04-14 NOTE — CONSULTS
H&P/Consult Note/Progress Note/Office Note:   Violet Siddiqui  MRN: [id number]  :1940  Age:85 y.o.    HPI: Violet Siddiqui is a 85 y.o. female who is seen in consultation for evaluation of lymphadenopathy and surgical biopsy.     The patient originally presented to the emergency department on 2025 with shortness of breath.  She had a CT of the chest that revealed large bulky mediastinal and left hilar lymphadenopathy concerning for malignancy/metastatic disease.  The patient was also found to have pleural effusions which were drained.  The patient was then discharged home, and was going to have outpatient workup for her lymphadenopathy.  The patient had a PET scan on 2025 that revealed hypermetabolic left hilar mass with extensive mediastinal and supraclavicular adenopathy.  The patient returned to the hospital on 2025 with worsening shortness of breath and hypoxia.     At this time she states that she is having some shortness of breath.  She denies any unintentional weight loss.     The patient has a past medical history significant for peripheral artery disease, history of left carotid endarterectomy, as well as SIADH.  She did develop A-fib with RVR while admitted, she is currently on amiodarone and heparin drip.               Past Medical History:   Diagnosis Date    Carotid artery stenosis 10/1/2014    Depression 2016    Hearing loss Last 5 to 10 years    Mostly Left ear    Hyperlipidemia     Hypertension     Hypothyroidism Per Chart    Insomnia 2016    Irritable bowel syndrome 2016    Osteoarthritis 2016    Osteoporosis 2016    Peripheral artery insufficiency 2016    Peripheral vascular disease Mar 14, 2022    See Dr John Cuevas 2016    TIA (transient ischemic attack) 2016     Past Surgical History:   Procedure Laterality Date    APPENDECTOMY      BREAST SURGERY Left     cyst removed    CHOLECYSTECTOMY      COLONOSCOPY       left pleural effusion. 3. Small partially loculated right pleural effusion. 4. Inflammatory change in the sigmoid colon with associated PET uptake. Most likely acute diverticulitis, malignancy not excluded. Electronically signed by CHARLES GARCIA    XR CHEST PORTABLE  Result Date: 4/10/2025  Findings/impression: Accounting for the differences in technique likely no significant interval change compared to the prior examination 1 day earlier. Electronically signed by Haseeb Gonzalez    XR CHEST PORTABLE  Result Date: 4/8/2025  Findings/impression: Medium sized left and small right pleural effusions again demonstrated with associated atelectasis left greater than right. Stable appearance of the cardiomediastinal silhouette. Mild prominence of central pulmonary vasculature. Senescent changes of the thoracic aorta, shoulders and spine. For more specific findings please see recent CT chest. Electronically signed by Haseeb Gonzalez    CT CHEST W CONTRAST  Result Date: 3/31/2025  1. Large bulky mediastinal and left hilar lymphadenopathy concerning for malignancy/metastatic disease. There is occlusion of the left lower lobe bronchus with compressive atelectasis/consolidation of the left lower lobe. 2. Moderate left and small right pleural effusions as above. 3. Trace pericardial effusion. Electronically signed by Vinay Butler    CT ABDOMEN PELVIS W IV CONTRAST Additional Contrast? None  Result Date: 3/31/2025  1. Long segment of wall thickening involving the sigmoid colon with trace pericolonic stranding which may relate to a colitis or acute diverticulitis. No extraluminal air or abscess is seen. However, malignancy is not excluded and direct visualization is recommended. 2. Small left pleural effusion with adjacent compressive atelectasis/consolidation. 3. Additional findings as above. Electronically signed by Vinay Butler            Admission date (for inpatients): 4/8/2025   4 Days Post-Op  Procedure(s):  THORACENTESIS  side effects of treatment;    or  ?1 acute or chronic illness or injury that poses a threat to life or bodily function   Extensive  (Must meet the requirements of at least 2 out of 3 categories)  Category 1: Tests, documents, or independent historian(s)  ?Any combination of 3 from the following:   ?Review of prior external note(s) from each unique source*;  ?Review of the result(s) of each unique test*;   ?Ordering of each unique test*;   ?Assessment requiring an independent historian(s)    or   Category 2: Independent interpretation of tests   ?Independent interpretation of a test performed by another physician/other qualified health care professional (not separately reported);     or  Category 3: Discussion of management or test interpretation  ?Discussion of management or test interpretation with external physician/other qualified health care professional/appropriate source (not separately reported)   High risk of morbidity from additional diagnostic testing or treatment  Examples only:  ?Drug therapy requiring intensive monitoring for toxicity  ?Decision regarding elective major surgery with identified patient or procedure risk factors  ?Decision regarding emergency major surgery  ?Decision regarding hospitalization  ?Decision not to resuscitate or to de-escalate care because of poor prognosis             I have personally performed a face-to-face diagnostic evaluation and management  service on this patient.      I have independently seen the patient.   I have independently obtained the above history from the patient/family.    I have independently examined the patient with above findings.  I have independently reviewed data/labs for this patient and developed the above plan of care (MDM).  Signed: Pao Mendoza MD, FACS

## 2025-04-14 NOTE — TELEPHONE ENCOUNTER
----- Message from Dr. Lang Martínez DO sent at 4/10/2025  1:56 PM EDT -----  Please set this patient up for a colonoscopy with GoLytely bowel prep.  Please have colonoscopy scheduled in 1 to 2 weeks with Sonya Rose or Gale at [facility].  Diagnosis, abnormal CT scan possible sigmoid colon mass.

## 2025-04-15 ENCOUNTER — ANESTHESIA (OUTPATIENT)
Dept: SURGERY | Age: 85
End: 2025-04-15
Payer: MEDICARE

## 2025-04-15 PROCEDURE — 6360000002 HC RX W HCPCS: Performed by: NURSE ANESTHETIST, CERTIFIED REGISTERED

## 2025-04-15 PROCEDURE — 2580000003 HC RX 258: Performed by: NURSE ANESTHETIST, CERTIFIED REGISTERED

## 2025-04-15 PROCEDURE — 2500000003 HC RX 250 WO HCPCS: Performed by: NURSE ANESTHETIST, CERTIFIED REGISTERED

## 2025-04-15 RX ORDER — SODIUM PICOSULFATE, MAGNESIUM OXIDE, AND ANHYDROUS CITRIC ACID 12; 3.5; 1 G/175ML; G/175ML; MG/175ML
175 LIQUID ORAL ONCE
Qty: 1 EACH | Refills: 0 | Status: SHIPPED | OUTPATIENT
Start: 2025-04-15 | End: 2025-04-17 | Stop reason: HOSPADM

## 2025-04-15 RX ORDER — PROPOFOL 10 MG/ML
INJECTION, EMULSION INTRAVENOUS
Status: DISCONTINUED | OUTPATIENT
Start: 2025-04-15 | End: 2025-04-15 | Stop reason: SDUPTHER

## 2025-04-15 RX ORDER — ONDANSETRON 2 MG/ML
INJECTION INTRAMUSCULAR; INTRAVENOUS
Status: DISCONTINUED | OUTPATIENT
Start: 2025-04-15 | End: 2025-04-15 | Stop reason: SDUPTHER

## 2025-04-15 RX ORDER — LIDOCAINE HYDROCHLORIDE 20 MG/ML
INJECTION, SOLUTION EPIDURAL; INFILTRATION; INTRACAUDAL; PERINEURAL
Status: DISCONTINUED | OUTPATIENT
Start: 2025-04-15 | End: 2025-04-15 | Stop reason: SDUPTHER

## 2025-04-15 RX ORDER — DEXAMETHASONE SODIUM PHOSPHATE 4 MG/ML
INJECTION, SOLUTION INTRA-ARTICULAR; INTRALESIONAL; INTRAMUSCULAR; INTRAVENOUS; SOFT TISSUE
Status: DISCONTINUED | OUTPATIENT
Start: 2025-04-15 | End: 2025-04-15 | Stop reason: SDUPTHER

## 2025-04-15 RX ORDER — EPHEDRINE SULFATE 5 MG/ML
INJECTION INTRAVENOUS
Status: DISCONTINUED | OUTPATIENT
Start: 2025-04-15 | End: 2025-04-15 | Stop reason: SDUPTHER

## 2025-04-15 RX ORDER — FENTANYL CITRATE 50 UG/ML
INJECTION, SOLUTION INTRAMUSCULAR; INTRAVENOUS
Status: DISCONTINUED | OUTPATIENT
Start: 2025-04-15 | End: 2025-04-15 | Stop reason: SDUPTHER

## 2025-04-15 RX ADMIN — PROPOFOL 30 MG: 10 INJECTION, EMULSION INTRAVENOUS at 12:14

## 2025-04-15 RX ADMIN — LIDOCAINE HYDROCHLORIDE 25 MG: 20 INJECTION, SOLUTION EPIDURAL; INFILTRATION; INTRACAUDAL; PERINEURAL at 12:15

## 2025-04-15 RX ADMIN — PHENYLEPHRINE HYDROCHLORIDE 0.5 ML: 10 INJECTION INTRAVENOUS at 12:47

## 2025-04-15 RX ADMIN — FENTANYL CITRATE 25 MCG: 50 INJECTION, SOLUTION INTRAMUSCULAR; INTRAVENOUS at 12:53

## 2025-04-15 RX ADMIN — ONDANSETRON 4 MG: 2 INJECTION INTRAMUSCULAR; INTRAVENOUS at 12:47

## 2025-04-15 RX ADMIN — DEXAMETHASONE SODIUM PHOSPHATE 10 MG: 4 INJECTION, SOLUTION INTRAMUSCULAR; INTRAVENOUS at 12:47

## 2025-04-15 RX ADMIN — EPHEDRINE SULFATE 5 MG: 5 INJECTION INTRAVENOUS at 12:58

## 2025-04-15 RX ADMIN — PROPOFOL 40 MG: 10 INJECTION, EMULSION INTRAVENOUS at 12:15

## 2025-04-15 RX ADMIN — EPHEDRINE SULFATE 15 MG: 5 INJECTION INTRAVENOUS at 12:26

## 2025-04-15 RX ADMIN — PHENYLEPHRINE HYDROCHLORIDE 1 ML: 10 INJECTION INTRAVENOUS at 12:29

## 2025-04-15 NOTE — PROGRESS NOTES
Hospitalist Progress Note   Admit Date:  2025 11:10 AM   Name:  Violet Siddiqui   Age:  85 y.o.  Sex:  female  :  1940   MRN:  [id number]   [facility]  [facility]    Presenting/Chief Complaint: Shortness of Breath (Upon exertion. X multiple months. 88% RA)     Reason(s) for Admission: Pleural effusion [J90]  Hypoxia [R09.02]  Atrial fibrillation, unspecified type (HCC) [I48.91]     Hospital Course:   Violet Siddiqui is a 85 y.o. female with medical history significant for left carotid endarterectomy, SIADH, hypothyroidism, hypertension, carotid artery disease status post CEA who presented with SOB.   Patient was recently discharged from the hospital  where CT scan revealed mass on lungs. Had left thoracentesis on .    Patient has PET on  and noticed to have wheezing with progressively worsening shortness of breath after that scan.  On presentation, she was saturating at 88% on RA.    Patient underwent left thoracentesis with pulmonary on 4/10 with removal of 380 cc from the left pleural effusion.  Surgery has been consulted for biopsy of lymphadenopathy.       Subjective/24 hr Events (04/15/25) :  No acute overnight events.    Dyspnea improving. Can lie down a little more flat. No chest pain or palpitations.    Assessment & Plan:   Pleural effusion  Acute hypoxic respiratory failure  S/p thoracentesis on 4/10 with removal of 380cc of left pleural effusion  -Cytology studies revealed transudative  - Continue O2 supplementation wean as tolerated  - Continue Lasix      Atrial fibrillation, new onset  -Appreciate cardiology.  -Continue with IV amiodarone  - Continue to hold heparin gtt for biopsy today      Left hilar mass  Mediastinal and supraclavicular adenopathy  Noted in PET scan  -CT on 3/31 showed a long segment bowel wall thickening of the sigmoid colon.  Concerning for malignancy.   - GI was consulted. Recommended outapatient colonoscopy.  - surgery consulted for biopsy. Planning  Urine Negative NEG      Blood, Urine Negative NEG      Urobilinogen, Urine 0.2 0.2 - 1.0 EU/dL    Nitrite, Urine Negative NEG      Leukocyte Esterase, Urine MODERATE (A) NEG      WBC, UA 20-50 0 /hpf    RBC, UA 0-3 0 /hpf    BACTERIA, URINE 0 0 /hpf    Urine Culture if Indicated URINE CULTURE ORDERED      Epithelial Cells, UA 0-3 0 /hpf    Casts 0 0 /lpf    Crystals 0 0 /LPF    Mucus, UA 0 0 /lpf    Other observations RESULTS VERIFIED MANUALLY     Culture, Urine    Collection Time: 04/13/25  4:32 PM    Specimen: Urine   Result Value Ref Range    Special Requests NO SPECIAL REQUESTS  Reflexed from [id number]        Culture CULTURE IN PROGRESS,FURTHER UPDATES TO FOLLOW     Anti-XA, Heparin    Collection Time: 04/13/25 10:28 PM   Result Value Ref Range    Anti-XA Unfrac Heparin 0.27 (L) 0.3 - 0.7 IU/mL   CBC with Auto Differential    Collection Time: 04/14/25 12:28 AM   Result Value Ref Range    WBC 18.5 (H) 4.3 - 11.1 K/uL    RBC 3.86 (L) 4.05 - 5.2 M/uL    Hemoglobin 11.8 11.7 - 15.4 g/dL    Hematocrit 35.0 (L) 35.8 - 46.3 %    MCV 90.7 82 - 102 FL    MCH 30.6 26.1 - 32.9 PG    MCHC 33.7 31.4 - 35.0 g/dL    RDW 14.2 11.9 - 14.6 %    Platelets 366 150 - 450 K/uL    MPV 10.3 9.4 - 12.3 FL    nRBC 0.00 0.0 - 0.2 K/uL    Differential Type AUTOMATED      Neutrophils % 75.3 43.0 - 78.0 %    Lymphocytes % 8.5 (L) 13.0 - 44.0 %    Monocytes % 9.5 4.0 - 12.0 %    Eosinophils % 1.5 0.5 - 7.8 %    Basophils % 0.5 0.0 - 2.0 %    Immature Granulocytes % 4.7 0.0 - 5.0 %    Neutrophils Absolute 13.96 (H) 1.70 - 8.20 K/UL    Lymphocytes Absolute 1.57 0.50 - 4.60 K/UL    Monocytes Absolute 1.76 (H) 0.10 - 1.30 K/UL    Eosinophils Absolute 0.28 0.00 - 0.80 K/UL    Basophils Absolute 0.09 0.00 - 0.20 K/UL    Immature Granulocytes Absolute 0.88 (H) 0.0 - 0.5 K/UL   Basic Metabolic Panel    Collection Time: 04/14/25 12:28 AM   Result Value Ref Range    Sodium 130 (L) 136 - 145 mmol/L    Potassium 4.5 3.5 - 5.1 mmol/L    Chloride 92 (L) 98

## 2025-04-15 NOTE — PLAN OF CARE
Problem: Respiratory - Adult  Goal: Achieves optimal ventilation and oxygenation  Outcome: Progressing  Flowsheets (Taken 4/15/2025 8419)  Achieves optimal ventilation and oxygenation: Assess for changes in respiratory status

## 2025-04-15 NOTE — PROGRESS NOTES
No SCD's were put on for this case.  The patient has an IV in her left leg and blood pressure cuff on the right.

## 2025-04-15 NOTE — CONSULTS
US Guided PIV access    Called for assistance with IV access. Ultrasound was used to find the vein which was compressible and does not have any features of an artery or nerve bundle. Skin was cleaned and disinfected prior to IV puncture. Under real-time ultrasound guidance, peripheral access was obtained in the right basilic using 20 G 1.75\" Peripheral IV catheter x 1 attempt. Blood return was present and IV flushed without difficulty. IV dressing applied, no immediate complications noted, and patient tolerated the procedure well.

## 2025-04-15 NOTE — PERIOP NOTE
TRANSFER - OUT REPORT:    Verbal report given to Nella GARZA on Violet Siddiqui  being transferred to [facility] for routine post-op       Report consisted of patient’s Situation, Background, Assessment and   Recommendations(SBAR).     Information from the following report(s) Nurse Handoff Report, Surgery Report, Cardiac Rhythm NSR, and Neuro Assessment was reviewed with the receiving nurse.    Lines:   Peripheral IV 04/13/25 Left;Posterior Forearm (Active)   Site Assessment Clean, dry & intact 04/15/25 0425   Line Status Infusing 04/15/25 0425   Line Care Cap changed;Connections checked and tightened 04/15/25 0425   Phlebitis Assessment No symptoms 04/15/25 0425   Infiltration Assessment 0 04/15/25 0425   Alcohol Cap Used Yes 04/15/25 0425   Dressing Status Clean, dry & intact 04/15/25 0425   Dressing Type Transparent 04/15/25 0425   Dressing Intervention New 04/13/25 0610       Peripheral IV 04/15/25 Right Forearm (Active)   Site Assessment Clean, dry & intact 04/15/25 1320   Line Status Normal saline locked 04/15/25 1320   Line Care Connections checked and tightened 04/15/25 1320   Phlebitis Assessment No symptoms 04/15/25 1320   Infiltration Assessment 0 04/15/25 1320   Dressing Status Clean, dry & intact 04/15/25 1320   Dressing Type Transparent 04/15/25 1320       Peripheral IV 04/15/25 Posterior;Right Foot (Active)   Site Assessment Clean, dry & intact 04/15/25 1320   Line Status Infusing 04/15/25 1320   Line Care Connections checked and tightened 04/15/25 1320   Phlebitis Assessment No symptoms 04/15/25 1320   Infiltration Assessment 0 04/15/25 1320   Alcohol Cap Used No 04/15/25 1149   Dressing Status Clean, dry & intact 04/15/25 1320   Dressing Type Transparent 04/15/25 1320   Dressing Intervention New 04/15/25 1149        Opportunity for questions and clarification was provided.      Patient transported with:   Monitor  O2 @ 4 liters  Registered Nurse    VTE prophylaxis orders have been written for Violet  House [facility]gill.    Patient and family given floor number and nurses name.  Family updated re: pt status after security code verified.

## 2025-04-15 NOTE — OP NOTE
[address]  [address]                            OPERATIVE REPORT      PATIENT NAME: ZAHEER OTT        : 1940  MED REC NO: [id number]                       ROOM: [facility] NO: [id number]                       ADMIT DATE: 2025  PROVIDER: Pao Mendoza MD    DATE OF SERVICE:  04/15/2025    PREOPERATIVE DIAGNOSES:  Lymphadenopathy, suspicious for lymphoma.    POSTOPERATIVE DIAGNOSES:  Lymphadenopathy, suspicious for lymphoma.    PROCEDURES PERFORMED:  Right supraclavicular lymph node biopsy.    SURGEON:  Pao Mendoza MD    ASSISTANT:  Geni    ANESTHESIA:  general    ESTIMATED BLOOD LOSS:  Minimal.    SPECIMENS REMOVED:  as above    INTRAOPERATIVE FINDINGS:  She does have a large lymph node behind the clavicle in the right neck and a large piece of the node were removed and sent for flow cytometry and regular pathology.     COMPLICATIONS:  none    IMPLANTS:  none    INDICATIONS:  This is an 85-year-old female presenting with lymphadenopathy involving her mediastinum and the cervical area and surgical biopsy were requested to rule out lymphoma.  The patient understood risks and benefits, agreed to proceed.    DESCRIPTION OF PROCEDURE:  After the informed consent obtained, the patient was brought into the operating room.  General anesthesia was administered.  The right neck area was prepped and draped in routine fashion and I took a curvilinear incision right above the right clavicle on the medial side and this was deepened through the platysma.  The sternocleidomastoid muscle was dissected and lifted.  The palpable lymph nodes were felt right behind this muscle.  The lymph nodes were carefully dissected.  This extended deep and behind the sternum and the medial side of the clavicle.  The entire complete dissection is quite difficult and considered dangerous, so we decided to just take a piece of this lymph node with a #15

## 2025-04-15 NOTE — BRIEF OP NOTE
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]    Post-Op Diagnosis: Same       Procedure(s):  SUPRACLAVICULAR LYMPH NODE BIOPSY EXCISION DISSECTION    Surgeon(s):  Pao Mendoza MD    Assistant:  Geni    Anesthesia: General    Estimated Blood Loss (mL): Minimal    Complications: None    Specimens:   ID Type Source Tests Collected by Time Destination   1 : right cervical lymph node Tissue Lymph Node FLOW CYTOMETRY LEUKEMIA/LYMPHOMA BLOOD Pao Mendoza MD 4/15/2025 1235    A : right cervical lymph node Tissue Lymph Node SURGICAL PATHOLOGY Pao Mendoza MD 4/15/2025 1256        Implants:  * No implants in log *      Drains:   External Urinary Catheter (Active)   Site Assessment Clean,dry & intact 04/15/25 0425   Placement Replaced 04/14/25 1955   Securement Method Other (Comment) 04/14/25 1955   Catheter Care Catheter/Wick replaced 04/14/25 1955   Perineal Care Yes 04/14/25 1955   Suction 40 mmgHg continuous 04/15/25 0425   Urine Color Yellow 04/15/25 0425   Urine Appearance Clear 04/15/25 0425   Urine Odor Malodorous 04/14/25 1433   Output (mL) 300 mL 04/15/25 0425       Findings:  Infection Present At Time Of Surgery (PATOS) (choose all levels that have infection present):  No infection present  Other Findings: enlarged LN suspicious of malignancy    Electronically signed by Pao Mendoza MD on 4/15/2025 at 1:35 PM

## 2025-04-15 NOTE — PROGRESS NOTES
Violet Siddiqui  Admission Date: 4/8/2025         Daily Progress Note: 4/15/2025    Background:   85 y.o.  female seen for recurrent pleural effusion.  She was seen by on 4/1 for left pleural effusion.  She has new concern for pulmonary malignancy, SÁNCHEZ and recurrent left pleural effusion with negative cytology. Her PMH includes left carotid artery stenosis, T2DM, HTN, CKD, hypothyroidism, SIADH, former tobacco abuse 60 total pack years- quit in 2014. She has not seen cardiology in the past. Denies pulmonary history.  Presented to the ED with SOB. CXR with recurrent left effusion. Her recent CT A/P with long segment of wall thickening involving the sigmoid colon with trace pericolonic stranding, no abscess or extraluminal air seen. CT Chest with bilateral pleural effusions and bulky mediastinal lymphadenopathy and hilar lymphadenopathy. PET with bilateral hypermetabolic supraclavicular adenopathy, poorly defined hypermetabolic left hilar mass with compression of the left lower lobe bronchus and complete left lower lobe atelectasis. There is also extensive hypermetabolic mediastinal adenopathy largest node is right  paratracheal, 4.9 x 3.7 cm and 6.9 SUV max.     Pt presented to the ER on 4/8 with HR in 150-160's, new onset atrial fibrillation with RVR.and cardiology consulted. We were asked to see her for acute respiratory failure with a new onset a fib RVR with hypotension.      Her daughter confirms DNI but wants medications and CPR if needed. She lives with her , walks, and is not on oxygen at home.   Subjective:        and daughter at bedside. Going to surgery this AM.  + BM after enema last night.     Current Facility-Administered Medications   Medication Dose Route Frequency    levalbuterol (XOPENEX) nebulization 0.63 mg  0.63 mg Nebulization Q8H PRN    calcium carbonate (TUMS) chewable tablet 500 mg  500 mg Oral TID PRN    polyethylene glycol (GLYCOLAX) packet 17 g

## 2025-04-15 NOTE — PROGRESS NOTES
[facility] CARDIOLOGY PROGRESS NOTE           4/15/2025 7:13 AM    Admit Date: 4/8/2025      Subjective:   Patient currently in sinus rhythm.  She denies any active dyspnea or chest pain.  Currently on 2 L of oxygen.  Hemodynamically stable.  Plans for lymph node biopsy today.  Currently on amiodarone 0.5 mg/min.  No recurrent atrial fibrillation.        ROS:  Cardiovascular:  As noted above    Objective:      Vitals:    04/14/25 2310 04/15/25 0015 04/15/25 0205 04/15/25 0451   BP: 114/67 98/87 105/89 (!) 138/46   Pulse: 76 77 71 68   Resp:  18  18   Temp:  97.9 °F (36.6 °C)  97.7 °F (36.5 °C)   TempSrc:       SpO2: 96% 97% 100% 96%   Weight:    62.7 kg (138 lb 3.7 oz)   Height:           Physical Exam:  General-elderly white female in no acute distress.  Neck- supple, no JVD  CV-regular rate and rhythm  Lung-clear to auscultation anteriorly  Abd- soft, nontender, nondistended  Ext- no edema bilaterally.  Skin- warm and dry      Data Review:   Recent Labs     04/14/25  0028 04/13/25  0623 04/12/25  0732   WBC 18.5* 18.8* 16.3*   HGB 11.8 11.8 12.6   HCT 35.0* 35.8 38.5   MCV 90.7 91.8 93.9    421 397       Recent Labs     04/15/25  0449 04/10/25  0538 04/09/25  0754   *   < > 143   K 4.5   < > 3.9   CL 90*   < > 101   CO2 32*   < > 31*   BUN 68*   < > 28*   CREATININE 0.84   < > 0.70   GLUCOSE 135*   < > 108*   CALCIUM 9.9   < > 8.8   BILITOT  --   --  0.4   ALKPHOS  --   --  125*   AST  --   --  56*   ALT  --   --  52*   LABGLOM 69   < > 85   GLOB  --   --  3.8*    < > = values in this interval not displayed.       No results for input(s): \"CKTOTAL\", \"CKMB\", \"CKMBINDEX\", \"DDIMER\", \"TROPONINI\" in the last 720 hours.    Echo (4/9/25):       Left Ventricle: Normal left ventricular systolic function with a visually estimated EF of 60 - 65%. Left ventricle size is normal. Normal wall thickness. Normal wall motion. Abnormal diastolic function.    Aortic Valve: Mild sclerosis of the aortic valve

## 2025-04-15 NOTE — ANESTHESIA POSTPROCEDURE EVALUATION
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    Dayron Phase I: Dayron Score: 9    Dayron Phase II:      Anesthesia Post Evaluation    Patient location during evaluation: PACU  Patient participation: complete - patient participated  Level of consciousness: awake and alert  Airway patency: patent  Nausea & Vomiting: no nausea and no vomiting  Cardiovascular status: hemodynamically stable  Respiratory status: acceptable  Hydration status: euvolemic  Comments: Blood pressure (!) 146/67, pulse 72, temperature 97.5 °F (36.4 °C), resp. rate 20, height 1.524 m (5'), weight 62.6 kg (138 lb), SpO2 97%.    No apparent anesthetic complications.  Pt stable for discharge from PACU  Multimodal analgesia pain management approach  Pain management: adequate    No notable events documented.

## 2025-04-16 DIAGNOSIS — R59.0 LAD (LYMPHADENOPATHY), MEDIASTINAL: ICD-10-CM

## 2025-04-16 DIAGNOSIS — R91.8 LUNG MASS: Primary | ICD-10-CM

## 2025-04-16 NOTE — PROGRESS NOTES
Admit Date: 2025    POD 1 Day Post-Op    Procedure:  Procedure(s):  SUPRACLAVICULAR LYMPH NODE BIOPSY EXCISION DISSECTION    Subjective:     The patient is lying comfortably in bed.  She states that she has not having any pain.  She feels well overall.     Objective:       Vitals:    25 0400 25 0415 25 0500 25 0600   BP: 91/71 (!) 145/70 (!) 123/50 103/81   Pulse: 88 89 90 86   Resp:  20     Temp:  98.1 °F (36.7 °C)     TempSrc:       SpO2: 97% 91% 94% 96%   Weight:  64.5 kg (142 lb 3.2 oz)     Height:           Temp (24hrs), Av.7 °F (36.5 °C), Min:97.2 °F (36.2 °C), Max:98.2 °F (36.8 °C)  .  I&O reviewed as documented.      Constitutional: Alert, oriented, cooperative patient in no acute distress; appears stated age    ENMT: no external lesions gross hearing normal; no obvious neck masses, no ear or lip lesions, nares normal, incision with dressing intact  CV: RRR. Normal perfusion  Resp: No JVD.  Breathing is  non-labored; no audible wheezing.    GI: soft and non-distended       Labs:   Recent Results (from the past 24 hours)   POCT Glucose    Collection Time: 04/15/25  1:42 PM   Result Value Ref Range    POC Glucose 152 (H) 65 - 100 mg/dL    Performed by: Brenda    Pregnancy, HCG qualitative serum    Collection Time: 04/15/25  5:02 PM   Result Value Ref Range    Preg, Serum Negative NEG     Anti-XA, Heparin    Collection Time: 04/15/25 10:53 PM   Result Value Ref Range    Anti-XA Unfrac Heparin 0.48 0.3 - 0.7 IU/mL   Anti-XA, Heparin    Collection Time: 25  5:56 AM   Result Value Ref Range    Anti-XA Unfrac Heparin 0.46 0.3 - 0.7 IU/mL   CBC with Auto Differential    Collection Time: 25  5:56 AM   Result Value Ref Range    WBC 22.9 (H) 4.3 - 11.1 K/uL    RBC 4.16 4.05 - 5.2 M/uL    Hemoglobin 12.7 11.7 - 15.4 g/dL    Hematocrit 39.3 35.8 - 46.3 %    MCV 94.5 82 - 102 FL    MCH 30.5 26.1 - 32.9 PG    MCHC 32.3 31.4 - 35.0 g/dL    RDW 14.5 11.9 - 14.6 %     Platelets 350 150 - 450 K/uL    MPV 10.3 9.4 - 12.3 FL    nRBC 0.00 0.0 - 0.2 K/uL    Differential Type AUTOMATED      Neutrophils % 82.8 (H) 43.0 - 78.0 %    Lymphocytes % 4.3 (L) 13.0 - 44.0 %    Monocytes % 7.1 4.0 - 12.0 %    Eosinophils % 1.2 0.5 - 7.8 %    Basophils % 0.2 0.0 - 2.0 %    Immature Granulocytes % 4.4 0.0 - 5.0 %    Neutrophils Absolute 18.98 (H) 1.70 - 8.20 K/UL    Lymphocytes Absolute 0.99 0.50 - 4.60 K/UL    Monocytes Absolute 1.64 (H) 0.10 - 1.30 K/UL    Eosinophils Absolute 0.27 0.00 - 0.80 K/UL    Basophils Absolute 0.05 0.00 - 0.20 K/UL    Immature Granulocytes Absolute 1.01 (H) 0.0 - 0.5 K/UL   Comprehensive Metabolic Panel w/ Reflex to MG    Collection Time: 04/16/25  5:56 AM   Result Value Ref Range    Sodium 133 (L) 136 - 145 mmol/L    Potassium 4.7 3.5 - 5.1 mmol/L    Chloride 91 (L) 98 - 107 mmol/L    CO2 32 (H) 20 - 29 mmol/L    Anion Gap 11 7 - 16 mmol/L    Glucose 145 (H) 70 - 99 mg/dL    BUN 87 (H) 8 - 23 MG/DL    Creatinine 0.87 0.60 - 1.10 MG/DL    Est, Glom Filt Rate 65 >60 ml/min/1.73m2    Calcium 10.2 8.8 - 10.2 MG/DL    Total Bilirubin 0.4 0.0 - 1.2 MG/DL    ALT 45 8 - 45 U/L    AST 88 (H) 15 - 37 U/L    Alk Phosphatase 109 (H) 35 - 104 U/L    Total Protein 6.5 6.3 - 8.2 g/dL    Albumin 2.6 (L) 3.2 - 4.6 g/dL    Globulin 3.9 (H) 2.3 - 3.5 g/dL    Albumin/Globulin Ratio 0.7 (L) 1.0 - 1.9             Assessment:     Principal Problem:    Pleural effusion  Active Problems:    Mediastinal adenopathy    Atrial fibrillation (HCC)    Acute pulmonary edema (HCC)    Acute respiratory failure    Abnormal abdominal CT scan    Sigmoid thickening    Severe sepsis    Colitis  Resolved Problems:    * No resolved hospital problems. *        Plan/Recommendations/Medical Decision Making:     Doing well following her biopsy  Remove dressing tomorrow  Await pathology results  Case management per hospitalist    Aamir White PA-C

## 2025-04-16 NOTE — PROGRESS NOTES
Spiritual Health History and Assessment/Progress Note  [facility]      Room # 403/01    Name: Violet Siddiqui           Age: 85 y.o.    Gender: female          MRN: [id number]  Religious: Restorationist       Preferred Language: English      Date: 04/16/25  Visit Time: Begin Time: (P) 1440 End Time : (P) 1500 Complexity: Complexity of Encounter: (P) Low      Visit Summary:  met with patient and her daughter-in-law. She said she was doing well in the hospital and had good support from her , children, and Congregation. We prayed for healing.    Referral/Consult From: Referral/Consult From: (P) Rounding  Encounter Overview/Reason: Encounter Overview/Reason: (P) Spiritual/Emotional Needs  Encounter Code:     Crisis (if applicable):    Service Provided For: Service Provided For: (P) Patient and family together     Patient was available.    Elisabet, Belief, Meaning:   Patient identifies as spiritual, is connected with a elisabet tradition or spiritual practice, and has beliefs or practices that help with coping during difficult times  Family/Friends identifies as spiritual, are connected with a elisabet tradition or spiritual practice, and have beliefs or practices that help with coping during difficult times  Rituals      Importance and Influence:  Patient does not have spiritual/personal beliefs that influence decisions regarding their health  Family/Friends does not have spiritual/personal beliefs that influence decisions regarding the patient's health    Community:  Patient   Support system includes  and (P) Children, Spouse   Family/Friends   Support system includes  and Parent/s and Extended family    Assessment and Plan of Care:   Emotions Expressed by Patient:   Assessment: (P) Calm    Interventions by :   Intervention: (P) Active listening, Prayer (assurance of)/[place], Sustaining Presence/Ministry of presence, Explored/Affirmed feelings, thoughts, concerns, Discussed belief system/Lutheran

## 2025-04-16 NOTE — CONSULTS
[facility] Hematology & Oncology        Inpatient Hematology / Oncology Consult    Reason for Consult:  Pleural effusion [J90]  Hypoxia [R09.02]  Atrial fibrillation, unspecified type (HCC) [I48.91]  Referring Physician:  Lexis Arora MD    History of Present Illness:  Ms. Siddqiui is a 85 y.o. female admitted on 4/8/2025. The primary encounter diagnosis was Hypoxia. Diagnoses of Pleural effusion, Atrial fibrillation, unspecified type (HCC), and Lymphoma of lymph nodes of neck, unspecified lymphoma type (HCC) were also pertinent to this visit..      Violet Siddiqui is a 85 y.o. female with medical history significant for left carotid endarterectomy, SIADH, hypothyroidism, hypertension, carotid artery disease status post left carotid endarterectomy.  She was recently admitted and discharged on 4/2/2025 - during this admission pt was found to have a lung mass on CT scan and also pleural effusion.  She had a thoracentesis on 4/1 with cytology negative for malignancy but with abundant lymphocytes.  She presented to the ER after PET scan on 4/7 with hypoxia and shortness of breath.  In the ER, she was found to have AF with RVR and cardiology was consulted - started on Heparin and Amio gtt.  Amio gtt stopped on 4/13 and now on PO Amio.  PET scan results from 4/7/2025 revealed hypermetabolic left hilar mass with extensive mediastinal LAD and supraclavicular LAD; atelectasis of the LLL and moderate sized left pleural effusion; inflammatory change in the sigmoid colon with uptake, felt to be acute diverticulitis, malignancy not excluded.  She was seen by GI and started on treatment for diverticulitis with Cipro and Flagyl.  They also recommended OP colonoscopy.  Pulmonary was consulted for pleural effusion and hypoxia and s/p thoracentesis on 4/10 with cytology pending.  She is s/p excisional LN biopsy with Dr. Mendoza on 4/15, flow and path are pending.  We were asked for our recommendations for this patient.      VAGINAL  1985    JOINT REPLACEMENT  2013    right hip    LYMPH NODE BIOPSY N/A 4/15/2025    SUPRACLAVICULAR LYMPH NODE BIOPSY EXCISION DISSECTION performed by Pao Mendoza MD at [facility] MAIN OR    ORTHOPEDIC SURGERY Right 2014    partial hip replacement    THORACENTESIS N/A 2025    THORACENTESIS ULTRASOUND performed by Pradeep Kolb MD at [facility] ENDOSCOPY    THORACENTESIS N/A 4/10/2025    THORACENTESIS ULTRASOUND performed by Tank Koroma MD at [facility] ENDOSCOPY    TONSILLECTOMY       Family History   Problem Relation Age of Onset    Heart Disease Brother         valve replaced    Thyroid Disease Paternal Grandmother         hypothyroidism    Cancer Mother         at age 90    Breast Cancer Mother 85    Heart Disease Mother     Lung Disease Sister     Heart Disease Sister     Other Sister         claudication    Stroke Father 64         at 65 after stroke    Diabetes Maternal Grandmother         controlled with diet     Social History     Socioeconomic History    Marital status:      Spouse name: Not on file    Number of children: Not on file    Years of education: Not on file    Highest education level: Not on file   Occupational History    Not on file   Tobacco Use    Smoking status: Former     Current packs/day: 0.00     Average packs/day: 1.5 packs/day for 40.0 years (60.0 ttl pk-yrs)     Types: Cigarettes     Quit date: 3/21/2014     Years since quittin.0    Smokeless tobacco: Never   Vaping Use    Vaping status: Never Used   Substance and Sexual Activity    Alcohol use: No    Drug use: Never    Sexual activity: Not Currently     Partners: Male   Other Topics Concern    Not on file   Social History Narrative    , walks the dog, yard work     Social Drivers of Health     Financial Resource Strain: Low Risk  (2024)    Overall Financial Resource Strain (CARDIA)     Difficulty of Paying Living Expenses: Not hard at all   Food Insecurity: No Food Insecurity (2025)    Hunger Vital Sign

## 2025-04-16 NOTE — PLAN OF CARE
Problem: Respiratory - Adult  Goal: Achieves optimal ventilation and oxygenation  4/16/2025 0938 by Alivia Molina RCP  Outcome: Progressing  4/15/2025 2231 by Vianney Croft RN  Outcome: Progressing

## 2025-04-16 NOTE — CARE COORDINATION
CM following. Pt s/p lymph node biopsy this morning. Remains on 2L supplemental. Back on Heparin gtt. [facility] arranged.

## 2025-04-16 NOTE — PROGRESS NOTES
Violet Siddiqui  Admission Date: 4/8/2025         Daily Progress Note: 4/16/2025    Background:   85 y.o.  female seen for recurrent pleural effusion.  She was seen by on 4/1 for left pleural effusion.  She has new concern for pulmonary malignancy, SÁNCHEZ and recurrent left pleural effusion with negative cytology. Her PMH includes left carotid artery stenosis, T2DM, HTN, CKD, hypothyroidism, SIADH, former tobacco abuse 60 total pack years- quit in 2014. She has not seen cardiology in the past. Denies pulmonary history.  Presented to the ED with SOB. CXR with recurrent left effusion. Her recent CT A/P with long segment of wall thickening involving the sigmoid colon with trace pericolonic stranding, no abscess or extraluminal air seen. CT Chest with bilateral pleural effusions and bulky mediastinal lymphadenopathy and hilar lymphadenopathy. PET with bilateral hypermetabolic supraclavicular adenopathy, poorly defined hypermetabolic left hilar mass with compression of the left lower lobe bronchus and complete left lower lobe atelectasis. There is also extensive hypermetabolic mediastinal adenopathy largest node is right  paratracheal, 4.9 x 3.7 cm and 6.9 SUV max.     Pt presented to the ER on 4/8 with HR in 150-160's, new onset atrial fibrillation with RVR.and cardiology consulted. We were asked to see her for acute respiratory failure with a new onset a fib RVR with hypotension.   Underwent lymph node biopsy on 4/15 with Dr. Mendoza.     Her daughter confirms DNI but wants medications and CPR if needed. She lives with her , walks, and is not on oxygen at home.   Subjective:     States she is feeling better. Remains on 2lpm. Having dry cough. Denies any pain.     Current Facility-Administered Medications   Medication Dose Route Frequency    ciprofloxacin (CIPRO) tablet 500 mg  500 mg Oral Q12H    metroNIDAZOLE (FLAGYL) tablet 500 mg  500 mg Oral q8h    amiodarone (CORDARONE) tablet 200 mg   No

## 2025-04-16 NOTE — PROGRESS NOTES
am  4/16/2025 6:53 AM    Admit Date: 4/8/2025    Admit Diagnosis: Pleural effusion [J90]  Hypoxia [R09.02]  Atrial fibrillation, unspecified type (HCC) [I48.91]      Subjective:    Patient was admitted on 4/8 for a-fib and hypoxia. She has been placed on Amiodarone for rhythm control, her most recent EKG shows sinus normal sinus rhythm. ECHO on 4/9 showed EF 60-65%. She was on IV heparin that was d/c'ed due to a lymph node biopsy, she has since been placed back on IV Heparin. She is resting comfortably in bed and does not endorse any chest pain, palpitations, or shortness of breath.    /60; HR 86.    Objective:    /81   Pulse 86   Temp 98.1 °F (36.7 °C)   Resp 20   Ht 1.524 m (5')   Wt 64.5 kg (142 lb 3.2 oz)   SpO2 96%   BMI 27.77 kg/m²     ROS:  General ROS: negative for - chills  Hematological and Lymphatic ROS: negative for - blood clots or jaundice  Respiratory ROS: no cough, shortness of breath, or wheezing  Cardiovascular ROS: no chest pain or dyspnea on exertion  Gastrointestinal ROS: no abdominal pain, change in bowel habits, or black or bloody stools  Neurological ROS: no TIA or stroke symptoms    Physical Exam:      Physical Examination: General appearance - Appearance: alert, well appearing, and in no distress.   Neck/lymph - supple, no significant adenopathy  Chest/CV - clear to auscultation, no wheezes, rales or rhonchi, symmetric air entry  Heart - normal rate, regular rhythm, normal S1, S2, no murmurs, rubs, clicks or gallops  Abdomen/GI - soft, nontender, nondistended, no masses or organomegaly   Musculoskeletal - no joint tenderness, deformity or swelling  Extremities - peripheral pulses normal, no pedal edema, no clubbing or cyanosis  Skin - normal coloration and turgor, no rashes, no suspicious skin lesions noted    Current Facility-Administered Medications   Medication Dose Route Frequency    amiodarone (CORDARONE) tablet 200 mg  200 mg Oral BID    levalbuterol (XOPENEX)

## 2025-04-17 NOTE — PLAN OF CARE
Problem: Respiratory - Adult  Goal: Achieves optimal ventilation and oxygenation  4/17/2025 0743 by Jenise Becker RCP  Outcome: Progressing  Flowsheets (Taken 4/17/2025 0743)  Achieves optimal ventilation and oxygenation:   Assess for changes in respiratory status   Assess for changes in mentation and behavior   Position to facilitate oxygenation and minimize respiratory effort   Oxygen supplementation based on oxygen saturation or arterial blood gases   Encourage broncho-pulmonary hygiene including cough, deep breathe, incentive spirometry   Assess and instruct to report shortness of breath or any respiratory difficulty   Respiratory therapy support as indicated

## 2025-04-17 NOTE — DISCHARGE INSTRUCTIONS
Syndrome of Inappropriate Antidiuretic Hormone Secretion (SIADH): Care Instructions  Your Care Instructions     Antidiuretic hormone (ADH) is a chemical made in the brain. It causes the kidneys to release less water. This reduces the amount of urine. At times, ADH levels are higher than they should be. This can happen if you have certain health problems. It is known as syndrome of inappropriate antidiuretic hormone secretion (SIADH).  SIADH may occur with lung disease or cancer. It can also happen with diseases of the brain and spinal cord. Using certain medicines may also cause it.  SIADH can cause fluid to build up in your body. It may cause hyponatremia (say \"vf-cr-zek-TREE-audrey-uh\"). This is a low level of sodium in the blood. If this happens, the balance of fluid and sodium in your body isn't normal. You may have symptoms such as loss of appetite, nausea, vomiting, and headaches.  Follow-up care is a key part of your treatment and safety. Be sure to make and go to all appointments, and call your doctor if you are having problems. It's also a good idea to know your test results and keep a list of the medicines you take.  How can you care for yourself at home?  If your doctor tells you to, take pills or drink fluids that contain sodium (such as sports drinks). Or you can eat salty foods.  Limit your intake of water, tea, coffee, juices, and other liquids that are mostly water, as your doctor advises.  Be safe with medicines. Take your medicines exactly as prescribed. Call your doctor if you think you are having a problem with your medicine.  Get your sodium levels tested as your doctor recommends.  When should you call for help?   Call 911 anytime you think you may need emergency care. For example, call if:    You have a seizure.     You passed out (lost consciousness).   Call your doctor now or seek immediate medical care if:    You are confused or have trouble focusing.   Watch closely for changes in your  [employer], and be sure to contact your doctor if:    You do not get better as expected.   Current as of: April 30, 2024  Content Version: 14.4  © 2024-2025 [employer].   Care instructions adapted under license by [employer]. If you have questions about a medical condition or this instruction, always ask your healthcare professional. [employer], disclaims any warranty or liability for your use of this information.     Learning About a Pleural Effusion  What is it?     A pleural effusion (say \"PLER-isacc gd-OSPB-bioh\") is the buildup of fluid in the pleural space. This is the space between the tissues lining the lungs and the chest wall. Because of the fluid buildup, the lungs may not be able to expand completely. This can make it hard to breathe.  Most pleural effusions don't become infected. But sometimes an infection causes pus to form in the pleural fluid. This is called pleural empyema (say \"xs-xh-ZP-muh\").  What causes it?  A pleural effusion has many possible causes. They include pneumonia, cancer, inflammation of the tissues around the lungs, and heart failure.  What are the symptoms?  A minor pleural effusion may not cause any symptoms. When symptoms occur, they may include trouble breathing, chest pain, fever, or a cough.  How is it diagnosed?  Your doctor will ask questions about your symptoms and past health and do a physical exam. The doctor will do a chest X-ray to confirm the diagnosis. You may also have other tests to find out what's causing the fluid buildup.  How is it treated?  A minor pleural effusion often goes away on its own. If needed, a needle may be used to remove the fluid (thoracentesis). This may relieve symptoms and help the lungs to expand more fully. Some fluid may be sent to a lab to look for the cause of the buildup.  If you don't get better, a tube (catheter) may be placed in the chest to drain fluid from the lungs. Some people may then have medicine put into the  chest to prevent future fluid buildup.  You may need treatment for the condition that caused the fluid buildup. For example, you may get medicines to treat pneumonia or heart failure. When the condition is treated, the effusion usually goes away.  If the fluid is infected (pleural empyema), the pus needs to be drained. A tube may be placed in the chest or you may have surgery. You also will get antibiotics.  Follow-up care is a key part of your treatment and safety. Be sure to make and go to all appointments, and call your doctor if you are having problems. It's also a good idea to know your test results and keep a list of the medicines you take.  Where can you learn more?  Go to https://www.[employer].net/patientEd and enter A920 to learn more about \"Learning About a Pleural Effusion.\"  Current as of: July 31, 2024  Content Version: 14.4  © 0012-1241 [employer].   Care instructions adapted under license by [employer]. If you have questions about a medical condition or this instruction, always ask your healthcare professional. [employer] disclaims any warranty or liability for your use of this information.

## 2025-04-17 NOTE — PROGRESS NOTES
04/17/25 1143   Resting (Room Air)   SpO2 87   HR 97   Resting (On O2)   SpO2 91   HR 86   O2 Device Nasal cannula   O2 Flow Rate (l/min) 2 l/min   FIO2 (%) 93   During Walk (On O2)   SpO2 95   HR 87   O2 Device Nasal cannula   O2 Flow Rate (l/min) 2 l/min   Walk/Assistance Device Walker   Rate of Dyspnea 2   After Walk   SpO2 94   HR 88   O2 Device Nasal cannula   O2 Flow Rate (l/min) 2 l/min   Rate of Dyspnea 2   Symptoms Fatigue   Comments Nurse walked Patient   Does the Patient Qualify for Home O2 Yes   Liter Flow at Rest 2   Liter Flow on Exertion 2

## 2025-04-17 NOTE — DISCHARGE SUMMARY
Hospitalist Discharge Summary   Admit Date:  2025 11:10 AM   DC Note date: 2025  Name:  Violet Siddiqui   Age:  85 y.o.  Sex:  female  :  1940   MRN:  [id number]   Room:  [facility]  PCP:  Zuleika Medrano MD    Presenting Complaint: Shortness of Breath (Upon exertion. X multiple months. 88% RA)     Initial Admission Diagnosis: Pleural effusion [J90]  Hypoxia [R09.02]  Atrial fibrillation, unspecified type (HCC) [I48.91]     Problem List for this Hospitalization (present on admission):    Principal Problem:    Pleural effusion  Active Problems:    Mediastinal adenopathy    Atrial fibrillation (HCC)    Acute pulmonary edema (HCC)    Acute respiratory failure    Abnormal abdominal CT scan    Sigmoid thickening    Severe sepsis    Colitis  Resolved Problems:    * No resolved hospital problems. *      Hospital Course:  Violet Siddiqui is a 85 y.o. female with medical history significant for left carotid endarterectomy, SIADH, hypothyroidism, hypertension, carotid artery disease status post CEA who presented with SOB.   Patient was recently discharged from the hospital  where CT scan revealed mass on lungs. Had left thoracentesis on .     Patient has PET on  and noticed to have wheezing with progressively worsening shortness of breath after that scan.  On presentation, she was saturating at 88% on RA.     Patient underwent left thoracentesis with pulmonary on 4/10 with removal of 380 cc from the left pleural effusion.  Surgery has been consulted for biopsy of lymphadenopathy.         Pleural effusion  Acute hypoxic respiratory failure  S/p thoracentesis on  during previous admission, and then on 4/10 with removal of 380cc of left pleural effusion  -Cytology studies revealed transudative  - She received O2 supplementation wean as tolerated. Discharged with 2L after walk test.  - She received Lasix. Prescribed Lasix for discharge.        Atrial fibrillation, new onset  -Required amio  (ELIQUIS) 5 MG TABS tablet Take 1 tablet by mouth 2 times daily  Qty: 60 tablet, Refills: 1      furosemide (LASIX) 40 MG tablet Take 1 tablet by mouth daily  Qty: 60 tablet, Refills: 1      ciprofloxacin (CIPRO) 500 MG tablet Take 1 tablet by mouth in the morning and 1 tablet in the evening. Do all this for 3 days.  Qty: 6 tablet, Refills: 0      metroNIDAZOLE (FLAGYL) 500 MG tablet Take 1 tablet by mouth every 8 (eight) hours for 3 days  Qty: 9 tablet, Refills: 0           CONTINUE these medications which have CHANGED    Details   lisinopril (PRINIVIL;ZESTRIL) 20 MG tablet Take 1 tablet by mouth daily  Qty: 180 tablet, Refills: 3    Associated Diagnoses: Essential hypertension      urea (URE-NA) 15 g PACK packet Take 30 g by mouth in the morning and at bedtime  Qty: 16 each, Refills: 0           CONTINUE these medications which have NOT CHANGED    Details   ALPRAZolam (XANAX) 0.25 MG tablet Take 1 tablet by mouth daily as needed for Anxiety for up to 30 days. Take 30 minutes prior to scan today. May repeat if needed. Max Daily Amount: 0.25 mg  Qty: 2 tablet, Refills: 0    Associated Diagnoses: Anxiety      Misc. Devices (ROLLATOR ULTRA-LIGHT) MISC 1 each by Does not apply route daily Use as directed to assist with walking.  Qty: 1 each, Refills: 0      traZODone (DESYREL) 50 MG tablet Take 1 tablet by mouth nightly as needed for Sleep  Qty: 30 tablet, Refills: 0      sodium chloride 1 g tablet Take 1 tablet by mouth 3 times daily (with meals)  Qty: 90 tablet, Refills: 3      atorvastatin (LIPITOR) 10 MG tablet Take 1 tablet by mouth every evening  Qty: 90 tablet, Refills: 3    Associated Diagnoses: Hyperlipidemia, unspecified hyperlipidemia type      levothyroxine (SYNTHROID) 88 MCG tablet Take 1/2 tab on Sunday and one all other days Indications: a condition with low thyroid hormone levels  Qty: 90 tablet, Refills: 3    Associated Diagnoses: Acquired hypothyroidism      aspirin 81 MG EC tablet Take 1 tablet by

## 2025-04-17 NOTE — PROGRESS NOTES
am  4/17/2025 6:33 AM    Admit Date: 4/8/2025    Admit Diagnosis: Pleural effusion [J90]  Hypoxia [R09.02]  Atrial fibrillation, unspecified type (HCC) [I48.91]      Subjective:    Patient experienced episodes of hypotension yesterday. Her Lisinopril has been held and her most recent BP is 133/79. She is resting comfortably in bed and does not endorse any chest pain or palpitations. She does have slight shortness of breath and is on 2L O2 via nasal cannula. She does still appear to be in A-fib. She is on amiodarone and eliquis.    /79; HR 82    Objective:    /79   Pulse 82   Temp 97.5 °F (36.4 °C)   Resp 20   Ht 1.524 m (5')   Wt 64 kg (141 lb)   SpO2 99%   BMI 27.54 kg/m²     ROS:  General ROS: negative for - chills  Hematological and Lymphatic ROS: negative for - blood clots or jaundice  Respiratory ROS: no cough, shortness of breath, or wheezing  Cardiovascular ROS: no chest pain or dyspnea on exertion  Gastrointestinal ROS: no abdominal pain, change in bowel habits, or black or bloody stools  Neurological ROS: no TIA or stroke symptoms    Physical Exam:      Physical Examination: General appearance - Appearance: alert, well appearing, and in no distress.   Neck/lymph - supple, no significant adenopathy  Chest/CV - clear to auscultation, no wheezes, rales or rhonchi, symmetric air entry  Heart - Irregularly irregular with rate 82  Abdomen/GI - soft, nontender, nondistended, no masses or organomegaly   Musculoskeletal - no joint tenderness, deformity or swelling  Extremities - peripheral pulses normal, no pedal edema, no clubbing or cyanosis  Skin - normal coloration and turgor, no rashes, no suspicious skin lesions noted    Current Facility-Administered Medications   Medication Dose Route Frequency    ciprofloxacin (CIPRO) tablet 500 mg  500 mg Oral Q12H    metroNIDAZOLE (FLAGYL) tablet 500 mg  500 mg Oral q8h    apixaban (ELIQUIS) tablet 5 mg  5 mg Oral BID    ipratropium 0.5 mg-albuterol 2.5  Or    potassium chloride 10 mEq/100 mL IVPB (Peripheral Line)  10 mEq IntraVENous PRN    magnesium sulfate 2000 mg in 50 mL IVPB premix  2,000 mg IntraVENous PRN    ondansetron (ZOFRAN-ODT) disintegrating tablet 4 mg  4 mg Oral Q8H PRN    Or    ondansetron (ZOFRAN) injection 4 mg  4 mg IntraVENous Q6H PRN    acetaminophen (TYLENOL) tablet 650 mg  650 mg Oral Q6H PRN    Or    acetaminophen (TYLENOL) suppository 650 mg  650 mg Rectal Q6H PRN       Data Review: data included in this note has been independently reviewed by the author     TELEMETRY: ELTON-dyllan with rate 82    Assessment/Plan:     Patient Active Problem List   Diagnosis    Primary osteoarthritis    Irritable bowel syndrome    Pain in both feet    Opioid use, unspecified with unspecified opioid-induced disorder    Dizziness and giddiness    Primary hypertension    Type 2 diabetes mellitus with peripheral vascular disease (HCC)    Left carotid stenosis    Osteoporosis    Insomnia    Hypothyroidism    Sciatica    Left hip pain    Hyperlipidemia    H/O carotid endarterectomy    Peripheral vascular disease, unspecified    Chronic renal disease, stage III (HCC) [829544]    History of TIA (transient ischemic attack)    Hyponatremia    Tremor    SIADH (syndrome of inappropriate ADH production)    Hypoxia    Pleural effusion    Mediastinal lymphadenopathy    Lung mass    Pleural effusion, left    Atrial fibrillation (HCC)    Acute pulmonary edema (HCC)    Acute respiratory failure    Abnormal abdominal CT scan    Sigmoid thickening    Abnormal CT scan    Severe sepsis    Colitis     PLAN  Atrial Fibrillation with RVR  Patient has been transferred to PO Amiodarone and Eliquis post-lymph node biopsy. Continue monitoring on telemetry    Hypotension  Lisinopril has been held due to hypotensive episodes. Plan to restart therapy outpatient.     Acute respiratory failure  Multifactorial.  Oxygen weaned to 2 L.  Her diuretics were restarted due to increased intake.  Will

## 2025-04-17 NOTE — PLAN OF CARE
Problem: Safety - Adult  Goal: Free from fall injury  4/17/2025 1143 by Prudencio Leiva RN  Outcome: Completed  4/17/2025 0036 by Vianney Croft RN  Outcome: Progressing     Problem: Skin/Tissue Integrity  Goal: Skin integrity remains intact  Description: 1.  Monitor for areas of redness and/or skin breakdown  2.  Assess vascular access sites hourly  3.  Every 4-6 hours minimum:  Change oxygen saturation probe site  4.  Every 4-6 hours:  If on nasal continuous positive airway pressure, respiratory therapy assess nares and determine need for appliance change or resting period  4/17/2025 1143 by Prudencio Leiva RN  Outcome: Completed  4/17/2025 0036 by Vianney Croft RN  Outcome: Progressing     Problem: Chronic Conditions and Co-morbidities  Goal: Patient's chronic conditions and co-morbidity symptoms are monitored and maintained or improved  4/17/2025 1143 by Prudencio Leiva RN  Outcome: Completed  4/17/2025 0036 by Vianney Croft RN  Outcome: Progressing     Problem: Discharge Planning  Goal: Discharge to home or other facility with appropriate resources  4/17/2025 1143 by Prudencio Leiva RN  Outcome: Completed  4/17/2025 0036 by Vianney Croft RN  Outcome: Progressing     Problem: ABCDS Injury Assessment  Goal: Absence of physical injury  4/17/2025 1143 by Prudencio Leiva RN  Outcome: Completed  4/17/2025 0036 by Vianney Croft RN  Outcome: Progressing     Problem: Respiratory - Adult  Goal: Achieves optimal ventilation and oxygenation  4/17/2025 1143 by Prudencio Leiva RN  Outcome: Completed  4/17/2025 0743 by Jenise Beckre RCP  Outcome: Progressing  Flowsheets (Taken 4/17/2025 0743)  Achieves optimal ventilation and oxygenation:   Assess for changes in respiratory status   Assess for changes in mentation and behavior   Position to facilitate oxygenation and minimize respiratory effort   Oxygen supplementation based on oxygen saturation or arterial blood gases   Encourage  Opioid abuse

## 2025-04-17 NOTE — PROGRESS NOTES
Outpatient Pharmacy Progress Note for Meds-to-Beds    Total number of Prescriptions Filled: 5    List of Medications (name,strength):  Amiodarone (English) 200mg  apixaban (English) 5mg  Ciprofloxacin (English) 500mg  Furosemide (English) 40mg  Metronidazole (English) 500mg  Lisinopril 20mg      Delivered to: spencer black      Co-pay: 59.22       Payment Type: card      Additional Documentation:  Medication(s) were delivered to the patient's room prior to discharge      Thank you for letting us serve your patients.  [facility] [id number] [place] [address]  Phone: [phone]  Fax: [phone]

## 2025-04-17 NOTE — PROGRESS NOTES
/         Violet Siddiqui  Admission Date: 4/8/2025         Daily Progress Note: 4/17/2025    Background:   85 y.o.  female seen for recurrent pleural effusion.  She was seen on 4/1 for left pleural effusion.  She has new concern for pulmonary malignancy, SÁNCHEZ, and recurrent left pleural effusion with negative cytology.     Her PMH includes left carotid artery stenosis, T2DM, HTN, CKD, hypothyroidism, SIADH, former tobacco abuse 60 total pack years- quit in 2014. She has not seen cardiology in the past. Denies pulmonary history.    Pt presented to the ER on 4/8 with HR in 150-160's, shortness of breath, new onset atrial fibrillation with RVR.and cardiology consulted. We were asked to see her for acute respiratory failure with a new onset a fib RVR with hypotension.   Underwent lymph node biopsy on 4/15 with Dr. Mendoza.   CXR with recurrent left effusion. Her recent CT A/P with long segment of wall thickening involving the sigmoid colon with trace pericolonic stranding, no abscess or extraluminal air seen. CT Chest with bilateral pleural effusions and bulky mediastinal lymphadenopathy and hilar lymphadenopathy. PET with bilateral hypermetabolic supraclavicular adenopathy, poorly defined hypermetabolic left hilar mass with compression of the left lower lobe bronchus and complete left lower lobe atelectasis. There is also extensive hypermetabolic mediastinal adenopathy largest node is right paratracheal, 4.9 x 3.7 cm and 6.9 SUV max.     Her daughter confirmed previously pt is DNI, but wants medications and CPR if needed. She lives with her , walks, and is not on oxygen at home.   Subjective:   On O2 via NC @ 1-2 L. SpO2 96-99%. Afebrile; Soft blood pressure this morning (90/74; MAP 79). Lisinopril held due to hypotension 4/16. She denies shortness of breath. Has occasional cough. Her daughter says she is going to get discharged today after walk test.     Current Facility-Administered Medications  effusions/adenopathy. Had thoracentesis on 4/1. Repeat Left thoracentesis on 4/10 w/ 380 mls transudative fluid with lymphocyte predominance. Cytology negative. PET on April 7th with hypermetabolic left hilar mass/mediastinal hilar adenopathy. LN bx 4/15.  Cardiology seeing for a fib.       Pleural effusion    Acute pulmonary edema (HCC)    Acute hypoxic respiratory failure  Plan:   -She has had 2 recent thoracenteses, most recent 4/10. Transudative. Associated with adenopathy, left hilar mass. Small and would not drain at this time.  -Net +2.9L since admission  -Continue Lasix daily  -Monitor renal function  -Hypotensive 4/16 & 4/17--Lisinopril held  -Currently on 1-2 L, continue to wean as tolerated.   -Needs O2 assessment prior to discharge-order placed previously.   -Message sent to office for follow up visit    Mediastinal adenopathy  Plan:   -Underwent R Supraclavicular lymph node biopsy on 4/15  -Oncology following; awaiting flow and path results from LN excision 4/15 by Dr. Mendoza    Abnormal abdominal CT scan  Plan:   -On abx for diverticulitis. Reported feeling \"full\" but no pain    Atrial fibrillation (HCC)  Plan:   -In and out of a-fib/sinus.  -On Amiodarone & Eliquis PO  -Cardiology following.    -Improving--planning for discharge 4/17 after walk test. Will need to arrange for home O2 prior to DC if needed.          More than 50% of the time documented was spent in face-to-face contact with the patient and in the care of the patient on the floor/unit where the patient is located.    In this split/shared evaluation I performed performed a medically appropriate history and exam, counseled and educated the patient and/or family member, ordered medications, tests or procedures, documented information in EMR, and coordinated care. which accounted for 16 minutes of clinical time.     Joycelyn Newman Head, FNP     ATTENDING ADDENDUM:    In this split/shared evaluation I performed reviewed the patients's H&P,  available images, labs, cultures., performed a medically appropriate history and exam, documented information in EMR, independently interpreted images, and coordinated care. which accounted for 17 minutes clinical time.     Impression: awaiting cytology from LN biopsy. Currently on oxygen and feels fine. Checking oxygen needs with exertion. Lung clear b/l. Continue lasix. Keep negative balance.f/u lytes  Continue remaining tx.          Woody Ni MD

## 2025-04-17 NOTE — CARE COORDINATION
Discharge order is in. Pt is discharging home today in stable condition. BENNY sent to [facility]  and [facility] services. Writer updated both agencies of pts discharge. Awaiting walk test, may require home oxygen. Will update note with results and arrange if needed.    1209-Pt on 2L and qualifies for home oxygen per RT:  04/17/25 1143    Resting (Room Air)   SpO2 87   HR 97   Resting (On O2)   SpO2 91   HR 86   O2 Device Nasal cannula   O2 Flow Rate (l/min) 2 l/min   FIO2 (%) 93   During Walk (On O2)   SpO2 95   HR 87   O2 Device Nasal cannula   O2 Flow Rate (l/min) 2 l/min   Walk/Assistance Device Walker   Rate of Dyspnea 2   After Walk   SpO2 94   HR 88   O2 Device Nasal cannula   O2 Flow Rate (l/min) 2 l/min   Rate of Dyspnea 2   Symptoms Fatigue   Comments Nurse walked Patient   Does the Patient Qualify for Home O2 Yes   Liter Flow at Rest 2   Liter Flow on Exertion 2   Portable tank from PM placed in pts room. Dtr at bedside. Clinicals sent and made aware of pts discharge. No other discharge needs identified by GAVIN, tx goals met.     04/17/25 1140   Services At/After Discharge   Transition of Care Consult (CM Consult) Home Health;Other  (Current with [facility]. Referral sent to [facility])   Internal Home Health No   Reason Outside Agency Chosen Patient already serviced by other home care/hospice agency   Services At/After Discharge Home Health   [place] Resource Information Provided? No   Mode of Transport at Discharge Other (see comment)  (Family)   Confirm Follow Up Transport Family   Condition of Participation: Discharge Planning   The Patient and/or Patient Representative was provided with a Choice of Provider? Patient   The Patient and/Or Patient Representative agree with the Discharge Plan? Yes   Freedom of Choice list was provided with basic dialogue that supports the patient's individualized plan of care/goals, treatment preferences, and shares the quality data associated with

## 2025-04-18 ENCOUNTER — CARE COORDINATION (OUTPATIENT)
Dept: CARE COORDINATION | Facility: CLINIC | Age: 85
End: 2025-04-18

## 2025-04-18 NOTE — CARE COORDINATION
Care Transitions Note    Initial Call - Call within 2 business days of discharge: Yes    Attempted to reach patient for transitions of care follow up. Unable to reach patient.    Outreach Attempts:   HIPAA compliant voicemail left for patient.     Patient: Violet Siddiqui    Patient : 1940   MRN: [id number]    Reason for Admission: Hypoxia  Discharge Date: 25  RURS: Readmission Risk Score: 24.2    Last Discharge Facility       Date Complaint Diagnosis Description Type Department Provider    25 Shortness of Breath Hypoxia ... ED to Hosp-Admission (Discharged) (ADMITTED) [id number] Ortega MD; Zack Astudillo..            Was this an external facility discharge? No    Follow Up Appointment:   Patient has hospital follow up appointment scheduled within 7 days of discharge.    Future Appointments         Provider Specialty Dept Phone    2025 9:00 AM Holly Mcdowell FNP Internal Medicine [phone]    2025 2:15 PM Jignesh Reis MD Oncology [phone]    2025 1:00 PM Ted Wall III, MD Cardiology [phone]    2025 10:00 AM Zuleika Medrano MD Internal Medicine [phone]            Plan for follow-up on next business day.      Sandy Clark RN

## 2025-04-18 NOTE — TELEPHONE ENCOUNTER
----- Message from Silvia LUGO sent at 4/18/2025  8:02 AM EDT -----  Regarding: FW: Hospital Follow Up    ----- Message -----  From: Head, AGATA Greene  Sent: 4/17/2025   9:53 AM EDT  To: #  Subject: Hospital Follow Up                               Please contact patient and arrange a MINS: 40 minute  14 day TCM Follow up with no further testing needed prior to appointment.         Thank you,   Joycelyn Newman Head, AGATA

## 2025-04-21 ENCOUNTER — CARE COORDINATION (OUTPATIENT)
Dept: CARE COORDINATION | Facility: CLINIC | Age: 85
End: 2025-04-21

## 2025-04-21 ENCOUNTER — HOSPITAL ENCOUNTER (EMERGENCY)
Age: 85
Discharge: HOME | End: 2025-04-21
Attending: EMERGENCY MEDICINE
Payer: MEDICARE

## 2025-04-21 ENCOUNTER — APPOINTMENT (OUTPATIENT)
Dept: GENERAL RADIOLOGY | Age: 85
End: 2025-04-21
Payer: MEDICARE

## 2025-04-21 ENCOUNTER — TELEPHONE (OUTPATIENT)
Age: 85
End: 2025-04-21

## 2025-04-21 ENCOUNTER — TELEPHONE (OUTPATIENT)
Dept: INTERNAL MEDICINE CLINIC | Facility: CLINIC | Age: 85
End: 2025-04-21

## 2025-04-21 ENCOUNTER — APPOINTMENT (OUTPATIENT)
Dept: CT IMAGING | Age: 85
End: 2025-04-21
Payer: MEDICARE

## 2025-04-21 VITALS
BODY MASS INDEX: 27.94 KG/M2 | SYSTOLIC BLOOD PRESSURE: 140 MMHG | HEART RATE: 95 BPM | OXYGEN SATURATION: 99 % | WEIGHT: 148 LBS | RESPIRATION RATE: 16 BRPM | DIASTOLIC BLOOD PRESSURE: 63 MMHG | TEMPERATURE: 97.9 F | HEIGHT: 61 IN

## 2025-04-21 DIAGNOSIS — R07.9 CHEST PAIN, UNSPECIFIED TYPE: ICD-10-CM

## 2025-04-21 DIAGNOSIS — I48.92 ATRIAL FLUTTER, UNSPECIFIED TYPE (HCC): ICD-10-CM

## 2025-04-21 DIAGNOSIS — R06.02 SHORTNESS OF BREATH: Primary | ICD-10-CM

## 2025-04-21 LAB
ALBUMIN SERPL-MCNC: 3 G/DL (ref 3.2–4.6)
ALBUMIN/GLOB SERPL: 1.1 (ref 1–1.9)
ALP SERPL-CCNC: 88 U/L (ref 35–104)
ALT SERPL-CCNC: 37 U/L (ref 8–45)
ANION GAP SERPL CALC-SCNC: 10 MMOL/L (ref 7–16)
AST SERPL-CCNC: 63 U/L (ref 15–37)
BASOPHILS # BLD: 0.1 K/UL (ref 0–0.2)
BASOPHILS NFR BLD: 0.7 % (ref 0–2)
BILIRUB SERPL-MCNC: 0.4 MG/DL (ref 0–1.2)
BUN SERPL-MCNC: 65 MG/DL (ref 8–23)
CALCIUM SERPL-MCNC: 9.8 MG/DL (ref 8.8–10.2)
CHLORIDE SERPL-SCNC: 101 MMOL/L (ref 98–107)
CO2 SERPL-SCNC: 35 MMOL/L (ref 20–29)
CREAT SERPL-MCNC: 0.83 MG/DL (ref 0.6–1.1)
DIFFERENTIAL METHOD BLD: ABNORMAL
EKG ATRIAL RATE: 189 BPM
EKG DIAGNOSIS: NORMAL
EKG Q-T INTERVAL: 355 MS
EKG QRS DURATION: 87 MS
EKG QTC CALCULATION (BAZETT): 485 MS
EKG R AXIS: 71 DEGREES
EKG T AXIS: 106 DEGREES
EKG VENTRICULAR RATE: 108 BPM
EOSINOPHIL # BLD: 0.42 K/UL (ref 0–0.8)
EOSINOPHIL NFR BLD: 2.9 % (ref 0.5–7.8)
ERYTHROCYTE [DISTWIDTH] IN BLOOD BY AUTOMATED COUNT: 14.6 % (ref 11.9–14.6)
GLOBULIN SER CALC-MCNC: 2.8 G/DL (ref 2.3–3.5)
GLUCOSE SERPL-MCNC: 142 MG/DL (ref 70–99)
HCT VFR BLD AUTO: 40.1 % (ref 35.8–46.3)
HGB BLD-MCNC: 12.6 G/DL (ref 11.7–15.4)
IMM GRANULOCYTES # BLD AUTO: 0.43 K/UL (ref 0–0.5)
IMM GRANULOCYTES NFR BLD AUTO: 3 % (ref 0–5)
LYMPHOCYTES # BLD: 1.74 K/UL (ref 0.5–4.6)
LYMPHOCYTES NFR BLD: 12.2 % (ref 13–44)
MCH RBC QN AUTO: 30 PG (ref 26.1–32.9)
MCHC RBC AUTO-ENTMCNC: 31.4 G/DL (ref 31.4–35)
MCV RBC AUTO: 95.5 FL (ref 82–102)
MONOCYTES # BLD: 1.36 K/UL (ref 0.1–1.3)
MONOCYTES NFR BLD: 9.5 % (ref 4–12)
NEUTS SEG # BLD: 10.24 K/UL (ref 1.7–8.2)
NEUTS SEG NFR BLD: 71.7 % (ref 43–78)
NRBC # BLD: 0 K/UL (ref 0–0.2)
PLATELET # BLD AUTO: 325 K/UL (ref 150–450)
PMV BLD AUTO: 10.2 FL (ref 9.4–12.3)
POTASSIUM SERPL-SCNC: 3.6 MMOL/L (ref 3.5–5.1)
PROT SERPL-MCNC: 5.8 G/DL (ref 6.3–8.2)
RBC # BLD AUTO: 4.2 M/UL (ref 4.05–5.2)
SODIUM SERPL-SCNC: 146 MMOL/L (ref 136–145)
TROPONIN T SERPL HS-MCNC: 13.5 NG/L (ref 0–14)
TROPONIN T SERPL HS-MCNC: 14.5 NG/L (ref 0–14)
WBC # BLD AUTO: 14.3 K/UL (ref 4.3–11.1)

## 2025-04-21 PROCEDURE — 93010 ELECTROCARDIOGRAM REPORT: CPT | Performed by: INTERNAL MEDICINE

## 2025-04-21 PROCEDURE — 6360000004 HC RX CONTRAST MEDICATION: Performed by: EMERGENCY MEDICINE

## 2025-04-21 PROCEDURE — 84484 ASSAY OF TROPONIN QUANT: CPT

## 2025-04-21 PROCEDURE — 71260 CT THORAX DX C+: CPT

## 2025-04-21 PROCEDURE — 71045 X-RAY EXAM CHEST 1 VIEW: CPT

## 2025-04-21 PROCEDURE — 85025 COMPLETE CBC W/AUTO DIFF WBC: CPT

## 2025-04-21 PROCEDURE — 80053 COMPREHEN METABOLIC PANEL: CPT

## 2025-04-21 PROCEDURE — 93005 ELECTROCARDIOGRAM TRACING: CPT | Performed by: EMERGENCY MEDICINE

## 2025-04-21 PROCEDURE — 99285 EMERGENCY DEPT VISIT HI MDM: CPT

## 2025-04-21 RX ORDER — IOPAMIDOL 755 MG/ML
100 INJECTION, SOLUTION INTRAVASCULAR
Status: COMPLETED | OUTPATIENT
Start: 2025-04-21 | End: 2025-04-21

## 2025-04-21 RX ADMIN — IOPAMIDOL 100 ML: 755 INJECTION, SOLUTION INTRAVENOUS at 14:03

## 2025-04-21 ASSESSMENT — ENCOUNTER SYMPTOMS
VOMITING: 0
SHORTNESS OF BREATH: 1
WHEEZING: 1
SORE THROAT: 0
BACK PAIN: 0
NAUSEA: 0
COUGH: 1
ABDOMINAL PAIN: 0

## 2025-04-21 ASSESSMENT — PAIN - FUNCTIONAL ASSESSMENT: PAIN_FUNCTIONAL_ASSESSMENT: 0-10

## 2025-04-21 ASSESSMENT — PAIN SCALES - GENERAL: PAINLEVEL_OUTOF10: 0

## 2025-04-21 NOTE — TELEPHONE ENCOUNTER
Patients  Kamaljit called in to cancel surgery with Ertem   (patients [facility] ) did not want to reschedule this case

## 2025-04-21 NOTE — TELEPHONE ENCOUNTER
Care Transitions Initial Follow Up Call    Outreach made within 2 business days of discharge: Yes    Patient: Violet Siddiqui Patient : 1940   MRN: [id number]  Reason for Admission: Pleural effusion  Discharge Date: 25       Spoke with: Patient    Discharge department/facility: [facility] Interactive Patient Contact:  Was patient able to fill all prescriptions: Yes  Was patient instructed to bring all medications to the follow-up visit: Yes  Is patient taking all medications as directed in the discharge summary? Yes  Does patient understand their discharge instructions: Yes  Does patient have questions or concerns that need addressed prior to 7-14 day follow up office visit: no    Additional needs identified to be addressed with provider  No needs identified             Scheduled appointment with PCP within 7-14 days    Follow Up  Future Appointments   Date Time Provider Department Center   2025  9:00 AM Holly Mcdowell FNP [facility] DEP   2025  2:15 PM Jignesh Reis MD [facility] AMB   2025  1:00 PM Ted Wall III, MD [facility] AMB   2025 10:00 AM Zuleika Medrano MD [facility] DEP       Shelbi Barksdale

## 2025-04-21 NOTE — ED NOTES
Patient mobility status  with no difficulty.     I have reviewed discharge instructions with the patient.  The patient verbalized understanding.    Patient left ED via Discharge Method: wheelchair to Home with Extended Family:.    Opportunity for questions and clarification provided.     Patient given 0 scripts.

## 2025-04-21 NOTE — ED TRIAGE NOTES
Patient arrives from home via EMS after having chest pain and SOB. Patient reports she felt a heaviness in her chest earlier today while with her family. Patient reports pain has subsided at this time.

## 2025-04-21 NOTE — ED PROVIDER NOTES
Food in the Last Year: Never true   Transportation Needs: No Transportation Needs (4/11/2025)    PRAPARE - Transportation     Lack of Transportation (Medical): No     Lack of Transportation (Non-Medical): No   Physical Activity: Insufficiently Active (8/26/2024)    Exercise Vital Sign     Days of Exercise per Week: 2 days     Minutes of Exercise per Session: 20 min   Social Connections: Unknown (3/20/2021)    Received from [facility]    Social Connections     Frequency of Communication with Friends and Family: Not asked     Frequency of Social Gatherings with Friends and Family: Not asked   Intimate Partner Violence: Unknown (3/20/2021)    Received from [facility]    Intimate Partner Violence     Fear of Current or Ex-Partner: Not asked     Emotionally Abused: Not asked     Physically Abused: Not asked     Sexually Abused: Not asked   Housing Stability: Low Risk  (4/11/2025)    Housing Stability Vital Sign     Unable to Pay for Housing in the Last Year: No     Number of Times Moved in the Last Year: 0     Homeless in the Last Year: No        Allergies: Lactose, Nexletol [bempedoic acid], and Statins    Previous Medications    ALPRAZOLAM (XANAX) 0.25 MG TABLET    Take 1 tablet by mouth daily as needed for Anxiety for up to 30 days. Take 30 minutes prior to scan today. May repeat if needed. Max Daily Amount: 0.25 mg    AMIODARONE (CORDARONE) 200 MG TABLET    Take 1 tablet by mouth 2 times daily    APIXABAN (ELIQUIS) 5 MG TABS TABLET    Take 1 tablet by mouth 2 times daily    ASPIRIN 81 MG EC TABLET    Take 1 tablet by mouth 2 times daily    ATORVASTATIN (LIPITOR) 10 MG TABLET    Take 1 tablet by mouth every evening    CALCIUM CARBONATE-VITAMIN D (CALCIUM-VITAMIN D) 600-125 MG-UNIT TABS    Take 1 tablet by mouth Daily with lunch    CIPROFLOXACIN (CIPRO) 500 MG TABLET    Take 1 tablet by mouth in the morning and 1 tablet in the evening. Do all this for 3 days.    COENZYME Q10 100 MG

## 2025-04-22 ENCOUNTER — OFFICE VISIT (OUTPATIENT)
Age: 85
End: 2025-04-22

## 2025-04-22 VITALS
HEIGHT: 61 IN | HEART RATE: 72 BPM | DIASTOLIC BLOOD PRESSURE: 82 MMHG | BODY MASS INDEX: 26.62 KG/M2 | SYSTOLIC BLOOD PRESSURE: 120 MMHG | WEIGHT: 141 LBS

## 2025-04-22 DIAGNOSIS — I10 PRIMARY HYPERTENSION: ICD-10-CM

## 2025-04-22 DIAGNOSIS — E11.51 TYPE 2 DIABETES MELLITUS WITH PERIPHERAL VASCULAR DISEASE (HCC): ICD-10-CM

## 2025-04-22 DIAGNOSIS — I48.0 PAROXYSMAL ATRIAL FIBRILLATION (HCC): ICD-10-CM

## 2025-04-22 DIAGNOSIS — I73.9 PERIPHERAL VASCULAR DISEASE, UNSPECIFIED: Primary | ICD-10-CM

## 2025-04-22 NOTE — PROGRESS NOTES
[facility] CARDIOLOGY, [address], [address]  [place] [address]  PHONE: [phone]    SUBJECTIVE: /HPI  Violet Siddiqui (1940) is a 85 y.o. female seen for a follow up visit regarding the following:   Specialty Problems          Cardiology Problems    Peripheral vascular disease, unspecified        Hyperlipidemia        Primary hypertension        Left carotid stenosis        Type 2 diabetes mellitus with peripheral vascular disease (HCC)        Atrial fibrillation (HCC)         Patient here for TC 7 follow-up admit date 4/8/2025 discharge date 4/17/2025 telephone calls reviewed patient was admitted for basically a new diagnosis of metastatic lung cancer that is extensive with underlying COPD she had incidentally atrial fibrillation which we dealt with with amiodarone and Eliquis.  She has on 4 to 5 L of oxygen and was in the emergency room yesterday for recurrent chest pain from her lung cancer she has yet to see oncology or pulmonary    Past Medical History, Past Surgical History, Family history, Social History, and Medications were all reviewed with the patient today and updated as necessary.    Allergies   Allergen Reactions    Lactose Other (See Comments) and Itching     Abdominal pain, irritable bowel    Nexletol [Bempedoic Acid] Diarrhea    Statins Myalgia and Other (See Comments)     Only allergic to pravastatin     Past Medical History:   Diagnosis Date    Cancer (HCC)     Carotid artery stenosis 10/1/2014    Depression 1/4/2016    Hearing loss Last 5 to 10 years    Mostly Left ear    Hyperlipidemia     Hypertension     Hypothyroidism Per Chart    Insomnia 1/4/2016    Irritable bowel syndrome 1/4/2016    Osteoarthritis 1/4/2016    Osteoporosis 1/4/2016    Peripheral artery insufficiency 1/4/2016    Peripheral vascular disease Mar 14, 2022    See Dr John Cuevas 1/4/2016    TIA (transient ischemic attack) 9/11/2016     Past Surgical History:   Procedure Laterality Date

## 2025-04-22 NOTE — TELEPHONE ENCOUNTER
Missed TCM call window timing.     D/c 4/17/24 w/ O2, needs CXR at f/u visit and orders are in.  Appt w/ any provider w/ in 4 weeks of d/c.

## 2025-04-24 ENCOUNTER — TELEPHONE (OUTPATIENT)
Dept: ONCOLOGY | Age: 85
End: 2025-04-24

## 2025-04-24 NOTE — TELEPHONE ENCOUNTER
Chart reviewed by Dr. Reis.  Returned call to daughter.  Informed the pt would have to be present for us to do any kind of visit for legal reasons.  If they need transportation, this would need to be coordinated with navigation and social work. Currently, daughter declines.  [facility] hospice nurse is currently with patient and not sure if patient would make it to appointment date.  Informed will forward to Dr. Reis and team.

## 2025-04-24 NOTE — TELEPHONE ENCOUNTER
Physician provider: Dr. Reis  Reason for today's call (Please detail here patients chief complaint): Appointment question   Last office visit:na  Patient Callback Number: [phone]  Was callback number verified?: Yes  Preferred pharmacy (If refill request): na  Veriified that patient confirmed no refills left at pharmacy? :No  Calls to office within the last 48 hours?:No    Warm Transfer to (For [place] Words): na    Patient notified that their information will be routed to the [facility] clinical triage team for review. Patient is advised that they will receive a phone call from the triage department. If symptom related and symptoms worsen before receiving a call back, the patient has been advised to proceed to the nearest ED.     Patients daughter called in asking if Melanie (daughter) and (cathy) son, can come on behalf of patient for her appointment 5/1. Patients daughter was informed that [facility] will not proceed with an appointment without patient present.   Melanie also wanted to let Dr. Reis know that the family called in [facility] yesterday.

## 2025-04-28 NOTE — PROGRESS NOTES
Physician Progress Note      PATIENT:               ZAHEER OTT  CSN #:                  [id number]  :                       1940  ADMIT DATE:       2025 11:10 AM  DISCH DATE:        2025 1:33 PM  RESPONDING  PROVIDER #:        Lexis Arora MD          QUERY TEXT:    Pleural Effusion is documented in the medical record H&P  throughout chart   through DS . Please specify the cause:    The clinical indicators include:  DS : Patient was recently discharged from the hospital  where CT scan   revealed mass on lungs. Had left thoracentesis on , and then on 4/10 with   removal of 380cc of left pleural effusion.  Patient has PET on  , -Cytology   studies revealed transudative.  pulmonology consult note : She has new concern for pulmonary malignancy,   SÁNCHEZ and recurrent left pleural effusion with negative cytology. CT Chest with   bilateral pleural effusions and bulky mediastinal lymphadenopathy and hilar   lymphadenopathy.  Pleural effusion  H&P  -Recent diagnosis of malignant effusion last week requiring   thoracentesis  4/10 OP note - thoracentesis Pathology - RARE ATYPICAL CELLS PRESENT.    Thank You, Robyn CDS  Options provided:  -- Malignant pleural effusion related to the underlying malignancy, please   specify site.  -- Pleural effusion due to, please specify etiology of pleural effusion  -- Other - I will add my own diagnosis  -- Disagree - Not applicable / Not valid  -- Disagree - Clinically unable to determine / Unknown  -- Refer to Clinical Documentation Reviewer    PROVIDER RESPONSE TEXT:    The patient has a malignant pleural effusion related to the underlying   malignancy lung    Query created by: Vianca Khan on 2025 11:17 AM      QUERY TEXT:    Sepsis is documented  progress note. Based on your medical judgment,   please clarify the POA status of Sepsis at the time of the order to admit the   patient to inpatient status:    The clinical indicators
